# Patient Record
Sex: MALE | Race: BLACK OR AFRICAN AMERICAN | NOT HISPANIC OR LATINO | URBAN - METROPOLITAN AREA
[De-identification: names, ages, dates, MRNs, and addresses within clinical notes are randomized per-mention and may not be internally consistent; named-entity substitution may affect disease eponyms.]

---

## 2020-08-30 ENCOUNTER — INPATIENT (INPATIENT)
Facility: HOSPITAL | Age: 80
LOS: 3 days | Discharge: NURSING, REHAB OR LONG-TERM CARE | End: 2020-09-03
Attending: INTERNAL MEDICINE | Admitting: INTERNAL MEDICINE
Payer: MEDICARE

## 2020-08-30 VITALS
DIASTOLIC BLOOD PRESSURE: 75 MMHG | HEART RATE: 68 BPM | RESPIRATION RATE: 17 BRPM | OXYGEN SATURATION: 100 % | TEMPERATURE: 98 F | SYSTOLIC BLOOD PRESSURE: 122 MMHG

## 2020-08-30 DIAGNOSIS — I48.91 UNSPECIFIED ATRIAL FIBRILLATION: ICD-10-CM

## 2020-08-30 DIAGNOSIS — N18.9 CHRONIC KIDNEY DISEASE, UNSPECIFIED: ICD-10-CM

## 2020-08-30 DIAGNOSIS — I10 ESSENTIAL (PRIMARY) HYPERTENSION: ICD-10-CM

## 2020-08-30 DIAGNOSIS — I63.9 CEREBRAL INFARCTION, UNSPECIFIED: ICD-10-CM

## 2020-08-30 DIAGNOSIS — D64.9 ANEMIA, UNSPECIFIED: ICD-10-CM

## 2020-08-30 DIAGNOSIS — Z29.9 ENCOUNTER FOR PROPHYLACTIC MEASURES, UNSPECIFIED: ICD-10-CM

## 2020-08-30 DIAGNOSIS — F19.10 OTHER PSYCHOACTIVE SUBSTANCE ABUSE, UNCOMPLICATED: ICD-10-CM

## 2020-08-30 DIAGNOSIS — E78.5 HYPERLIPIDEMIA, UNSPECIFIED: ICD-10-CM

## 2020-08-30 DIAGNOSIS — I72.9 ANEURYSM OF UNSPECIFIED SITE: ICD-10-CM

## 2020-08-30 LAB
ALBUMIN SERPL ELPH-MCNC: 4.3 G/DL — SIGNIFICANT CHANGE UP (ref 3.3–5)
ALP SERPL-CCNC: 55 U/L — SIGNIFICANT CHANGE UP (ref 40–120)
ALT FLD-CCNC: 19 U/L — SIGNIFICANT CHANGE UP (ref 4–41)
ANION GAP SERPL CALC-SCNC: 16 MMO/L — HIGH (ref 7–14)
APPEARANCE UR: CLEAR — SIGNIFICANT CHANGE UP
APTT BLD: 34.6 SEC — SIGNIFICANT CHANGE UP (ref 27–36.3)
AST SERPL-CCNC: 31 U/L — SIGNIFICANT CHANGE UP (ref 4–40)
BACTERIA # UR AUTO: HIGH
BASOPHILS # BLD AUTO: 0.02 K/UL — SIGNIFICANT CHANGE UP (ref 0–0.2)
BASOPHILS NFR BLD AUTO: 0.3 % — SIGNIFICANT CHANGE UP (ref 0–2)
BILIRUB SERPL-MCNC: 0.4 MG/DL — SIGNIFICANT CHANGE UP (ref 0.2–1.2)
BILIRUB UR-MCNC: NEGATIVE — SIGNIFICANT CHANGE UP
BLD GP AB SCN SERPL QL: NEGATIVE — SIGNIFICANT CHANGE UP
BLOOD UR QL VISUAL: NEGATIVE — SIGNIFICANT CHANGE UP
BUN SERPL-MCNC: 43 MG/DL — HIGH (ref 7–23)
CALCIUM SERPL-MCNC: 10.1 MG/DL — SIGNIFICANT CHANGE UP (ref 8.4–10.5)
CHLORIDE SERPL-SCNC: 103 MMOL/L — SIGNIFICANT CHANGE UP (ref 98–107)
CHLORIDE UR-SCNC: 72 MMOL/L — SIGNIFICANT CHANGE UP
CO2 SERPL-SCNC: 21 MMOL/L — LOW (ref 22–31)
COLOR SPEC: SIGNIFICANT CHANGE UP
CREAT ?TM UR-MCNC: 79.5 MG/DL — SIGNIFICANT CHANGE UP
CREAT SERPL-MCNC: 2.3 MG/DL — HIGH (ref 0.5–1.3)
EOSINOPHIL # BLD AUTO: 0.09 K/UL — SIGNIFICANT CHANGE UP (ref 0–0.5)
EOSINOPHIL NFR BLD AUTO: 1.3 % — SIGNIFICANT CHANGE UP (ref 0–6)
GLUCOSE SERPL-MCNC: 115 MG/DL — HIGH (ref 70–99)
GLUCOSE UR-MCNC: NEGATIVE — SIGNIFICANT CHANGE UP
HCT VFR BLD CALC: 25.8 % — LOW (ref 39–50)
HGB BLD-MCNC: 8.7 G/DL — LOW (ref 13–17)
HYALINE CASTS # UR AUTO: NEGATIVE — SIGNIFICANT CHANGE UP
IMM GRANULOCYTES NFR BLD AUTO: 0.6 % — SIGNIFICANT CHANGE UP (ref 0–1.5)
INR BLD: 1.6 — HIGH (ref 0.88–1.16)
KETONES UR-MCNC: NEGATIVE — SIGNIFICANT CHANGE UP
LEUKOCYTE ESTERASE UR-ACNC: NEGATIVE — SIGNIFICANT CHANGE UP
LYMPHOCYTES # BLD AUTO: 0.89 K/UL — LOW (ref 1–3.3)
LYMPHOCYTES # BLD AUTO: 12.7 % — LOW (ref 13–44)
MCHC RBC-ENTMCNC: 33.7 % — SIGNIFICANT CHANGE UP (ref 32–36)
MCHC RBC-ENTMCNC: 35.4 PG — HIGH (ref 27–34)
MCV RBC AUTO: 104.9 FL — HIGH (ref 80–100)
MONOCYTES # BLD AUTO: 0.43 K/UL — SIGNIFICANT CHANGE UP (ref 0–0.9)
MONOCYTES NFR BLD AUTO: 6.1 % — SIGNIFICANT CHANGE UP (ref 2–14)
NEUTROPHILS # BLD AUTO: 5.54 K/UL — SIGNIFICANT CHANGE UP (ref 1.8–7.4)
NEUTROPHILS NFR BLD AUTO: 79 % — HIGH (ref 43–77)
NITRITE UR-MCNC: POSITIVE — HIGH
NRBC # FLD: 0 K/UL — SIGNIFICANT CHANGE UP (ref 0–0)
PH UR: 6.5 — SIGNIFICANT CHANGE UP (ref 5–8)
PLATELET # BLD AUTO: 197 K/UL — SIGNIFICANT CHANGE UP (ref 150–400)
PMV BLD: 10 FL — SIGNIFICANT CHANGE UP (ref 7–13)
POTASSIUM SERPL-MCNC: 4.6 MMOL/L — SIGNIFICANT CHANGE UP (ref 3.5–5.3)
POTASSIUM SERPL-SCNC: 4.6 MMOL/L — SIGNIFICANT CHANGE UP (ref 3.5–5.3)
POTASSIUM UR-SCNC: 46.2 MMOL/L — SIGNIFICANT CHANGE UP
PROT SERPL-MCNC: 7.5 G/DL — SIGNIFICANT CHANGE UP (ref 6–8.3)
PROT UR-MCNC: 9.4 MG/DL — SIGNIFICANT CHANGE UP
PROT UR-MCNC: NEGATIVE — SIGNIFICANT CHANGE UP
PROTHROM AB SERPL-ACNC: 18 SEC — HIGH (ref 10.6–13.6)
RBC # BLD: 2.46 M/UL — LOW (ref 4.2–5.8)
RBC # FLD: 15.1 % — HIGH (ref 10.3–14.5)
RBC CASTS # UR COMP ASSIST: SIGNIFICANT CHANGE UP (ref 0–?)
RH IG SCN BLD-IMP: POSITIVE — SIGNIFICANT CHANGE UP
SARS-COV-2 RNA SPEC QL NAA+PROBE: SIGNIFICANT CHANGE UP
SODIUM SERPL-SCNC: 140 MMOL/L — SIGNIFICANT CHANGE UP (ref 135–145)
SODIUM UR-SCNC: 73 MMOL/L — SIGNIFICANT CHANGE UP
SP GR SPEC: 1.04 — SIGNIFICANT CHANGE UP (ref 1–1.04)
SQUAMOUS # UR AUTO: SIGNIFICANT CHANGE UP
TROPONIN T, HIGH SENSITIVITY: 21 NG/L — SIGNIFICANT CHANGE UP (ref ?–14)
UROBILINOGEN FLD QL: NORMAL — SIGNIFICANT CHANGE UP
WBC # BLD: 7.01 K/UL — SIGNIFICANT CHANGE UP (ref 3.8–10.5)
WBC # FLD AUTO: 7.01 K/UL — SIGNIFICANT CHANGE UP (ref 3.8–10.5)
WBC UR QL: SIGNIFICANT CHANGE UP (ref 0–?)

## 2020-08-30 PROCEDURE — 70450 CT HEAD/BRAIN W/O DYE: CPT | Mod: 26,59

## 2020-08-30 PROCEDURE — 99285 EMERGENCY DEPT VISIT HI MDM: CPT

## 2020-08-30 PROCEDURE — 70496 CT ANGIOGRAPHY HEAD: CPT | Mod: 26

## 2020-08-30 PROCEDURE — 99222 1ST HOSP IP/OBS MODERATE 55: CPT

## 2020-08-30 PROCEDURE — 70498 CT ANGIOGRAPHY NECK: CPT | Mod: 26

## 2020-08-30 RX ORDER — THIAMINE MONONITRATE (VIT B1) 100 MG
100 TABLET ORAL DAILY
Refills: 0 | Status: DISCONTINUED | OUTPATIENT
Start: 2020-08-30 | End: 2020-09-03

## 2020-08-30 RX ORDER — ASPIRIN/CALCIUM CARB/MAGNESIUM 324 MG
81 TABLET ORAL DAILY
Refills: 0 | Status: DISCONTINUED | OUTPATIENT
Start: 2020-08-30 | End: 2020-09-03

## 2020-08-30 RX ORDER — RIVAROXABAN 15 MG-20MG
0 KIT ORAL
Qty: 0 | Refills: 0 | DISCHARGE

## 2020-08-30 RX ORDER — PANTOPRAZOLE SODIUM 20 MG/1
40 TABLET, DELAYED RELEASE ORAL
Refills: 0 | Status: DISCONTINUED | OUTPATIENT
Start: 2020-08-30 | End: 2020-09-03

## 2020-08-30 RX ORDER — FOLIC ACID 0.8 MG
1 TABLET ORAL DAILY
Refills: 0 | Status: DISCONTINUED | OUTPATIENT
Start: 2020-08-30 | End: 2020-09-03

## 2020-08-30 RX ORDER — LIDOCAINE 4 G/100G
1 CREAM TOPICAL DAILY
Refills: 0 | Status: DISCONTINUED | OUTPATIENT
Start: 2020-08-30 | End: 2020-09-03

## 2020-08-30 RX ORDER — ALLOPURINOL 300 MG
100 TABLET ORAL DAILY
Refills: 0 | Status: DISCONTINUED | OUTPATIENT
Start: 2020-08-30 | End: 2020-09-03

## 2020-08-30 RX ORDER — ATORVASTATIN CALCIUM 80 MG/1
40 TABLET, FILM COATED ORAL AT BEDTIME
Refills: 0 | Status: DISCONTINUED | OUTPATIENT
Start: 2020-08-30 | End: 2020-09-03

## 2020-08-30 RX ORDER — MORPHINE SULFATE 50 MG/1
4 CAPSULE, EXTENDED RELEASE ORAL ONCE
Refills: 0 | Status: DISCONTINUED | OUTPATIENT
Start: 2020-08-30 | End: 2020-08-30

## 2020-08-30 RX ORDER — RIVAROXABAN 15 MG-20MG
15 KIT ORAL
Refills: 0 | Status: DISCONTINUED | OUTPATIENT
Start: 2020-08-30 | End: 2020-09-03

## 2020-08-30 RX ORDER — ACETAMINOPHEN 500 MG
650 TABLET ORAL EVERY 6 HOURS
Refills: 0 | Status: DISCONTINUED | OUTPATIENT
Start: 2020-08-30 | End: 2020-09-03

## 2020-08-30 RX ADMIN — MORPHINE SULFATE 4 MILLIGRAM(S): 50 CAPSULE, EXTENDED RELEASE ORAL at 12:00

## 2020-08-30 RX ADMIN — Medication 100 MILLIGRAM(S): at 18:42

## 2020-08-30 RX ADMIN — Medication 650 MILLIGRAM(S): at 19:33

## 2020-08-30 RX ADMIN — Medication 81 MILLIGRAM(S): at 18:42

## 2020-08-30 RX ADMIN — ATORVASTATIN CALCIUM 40 MILLIGRAM(S): 80 TABLET, FILM COATED ORAL at 23:20

## 2020-08-30 RX ADMIN — RIVAROXABAN 15 MILLIGRAM(S): KIT at 18:42

## 2020-08-30 RX ADMIN — Medication 1 MILLIGRAM(S): at 18:43

## 2020-08-30 RX ADMIN — LIDOCAINE 1 PATCH: 4 CREAM TOPICAL at 23:20

## 2020-08-30 RX ADMIN — Medication 650 MILLIGRAM(S): at 18:42

## 2020-08-30 NOTE — H&P ADULT - NSICDXFAMILYHX_GEN_ALL_CORE_FT
FAMILY HISTORY:  Family history of heart disease    Sibling  Still living? Unknown  Family history of cerebrovascular accident (CVA), Age at diagnosis: Age Unknown

## 2020-08-30 NOTE — CONSULT NOTE ADULT - ATTENDING COMMENTS
79-year-old right-handed gentleman first evaluated at [facility] on 8/31/2020 with left-sided weakness and possibly left-sided and truncal ataxia.  History and exam as above, with minor changes.  ROS otherwise negative.  Exam.  Alert and attentive; left side: Deltoid 4/5, wrist extensors 4/5, iliopsoas 4/5, anterior tibialis 5/5; no limb ataxia; gait not tested; remainder of neurologic exam was nonfocal.  CT head (8/30/20) to my eye was unremarkable.  CTA neck and head (8/30/2020) to my eye showed an ACOM region aneurysm measured at 4-5 mm.  Impression.  He has a history of atrial fibrillation, on Xarelto, coronary stents inserted 2005, pacemaker.  On 8/30/2020 he developed left hemiparesis and imbalance, and appeared to initially possibly have the syndrome of left ataxic hemiparesis, with some subsequent improvement.  His presentation is consistent with right brain dysfunction, perhaps small-deep/subcortical or pontine but this is uncertain.  Etiology is probably acute ischemic stroke of uncertain mechanism, but possibly due to small vessel disease versus cardioembolism related to atrial fibrillation.  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  Given his age, it's unclear whether he will benefit from any kind of treatment of this aneurysm, but this possibility can be pursued electively.  Suggest.  If pacemaker compatible, then obtain MRI brain; if pacemaker not compatible, then repeat CT head; elective TTE as inpatient or outpatient; continue Xarelto (he has also been on aspirin, but the risk/benefit ratio of continuing aspirin should be discussed further with his cardiologist); follow-up as outpatient with Dr. Monty Martin (neuro interventional surgery); PT/OT.
Incidental unruptured AComm aneurysm, will follow up in the office.

## 2020-08-30 NOTE — H&P ADULT - HISTORY OF PRESENT ILLNESS
79 y.o male pmh of CAD s/p stents x 3 (2005), CKD, Afib on Xarelto, HTN,  s/p Medtronic PPM, myositis, presents with weakness and neck pain. Patient states that at 830AM he picked up a heavy object in the kitchen, put it down and started to have bilateral neck pain that was 11/10, sudden onset, shooting type of pain that radited to his mid spine. He tried to sit for a while but when he got up noticed his left arm was tingling and weak feeling and he could not walk well on his left leg. though pain had some mild improvement weakness did not and he came to the ER. He does note having a weak pain in both shoulders over past weeks but was not hte same. Of note reports that he is being scheduled for an endoscopy due to concern about blood in his stool. has had some weight loss unsure how much. Denies fever, chills, nausea, vomiting, brigid pain, palpitations, shaking/seizure activity, tongue biting, urinary incontinence, dizziness, syncope, fall, loc, head trauma, diplopia, sudden vision loss, diarrhea, brbpr, melena, dysuria hematuria, rash, weight gain, change in appetite, recent travel, sick contact or hospitalization.

## 2020-08-30 NOTE — ED ADULT TRIAGE NOTE - CHIEF COMPLAINT QUOTE
Pt states he has been having neck and back pain x1 week. Today pt lifted something heavy and states neck and back pain was unbearable. Pt also states at 830 he started having left sided leg and arm weakness. Pt unsteady gait in triage. OFE called. Code Stroke

## 2020-08-30 NOTE — CONSULT NOTE ADULT - SUBJECTIVE AND OBJECTIVE BOX
HPI:  79 year old male with a past medical history of HTN, HLD, CAD s/p stent, afib on Xarelto, pacemaker, presenting as a code stroke with left sided weakness and gait imbalance. At baseline, the patient does not use any ambulatory assistive devices and is able to hand all his own needs. The patient was last normal at approximately 8AM. At approximately 830AM the patient stated he lifted something heavy over his head and then sat down and felt weakness in his left upper extremity and pain in his neck on the left side. He then noted that his walking was off balance and that he had some left leg weakness. He denied any numbness, tingling, visual changes, dysphagia, dysarthria, inability to produce words, headaches, facial weakness/numbness. He stated that he had been compliant with all of his home medications. Denied any falls, head trauma, recent illness, sick contacts.   Currently continues to endorse shoulder and neck pain.     Vital Signs Last 24 Hrs  T(C): 36.4 (30 Aug 2020 12:21), Max: 36.7 (30 Aug 2020 10:12)  T(F): 97.5 (30 Aug 2020 12:21), Max: 98 (30 Aug 2020 10:12)  HR: 64 (30 Aug 2020 12:21) (64 - 68)  BP: 117/55 (30 Aug 2020 12:21) (117/55 - 122/75)  BP(mean): --  RR: 18 (30 Aug 2020 12:21) (17 - 18)  SpO2: 100% (30 Aug 2020 12:21) (100% - 100%)    PHYSICAL EXAM:  Awake Alert Attentive Affect appropriate Ox3  PERRL EOMI  Motor:   Tone: normal.                  Strength:     [X] Upper extremity                      Delt       Bicep    Tricep                                                  R   4+/5       4+/5       4+/5     4/5                                               L          5/5        5/5        5/5       5/5  [X] Lower extremity                       HF          KE          KF        DF         PF                                               R    5/5        5/5        5/5       5/5       5/5                                               L    5/5        5/5       5/5       5/5        5/5  Sensory Intact to Light Touch  Reflexes WNL, No clonus    LABS:                          8.7    7.01  )-----------( 197      ( 30 Aug 2020 10:40 )             25.8     08-30    140  |  103  |  43<H>  ----------------------------<  115<H>  4.6   |  21<L>  |  2.30<H>    Ca    10.1      30 Aug 2020 10:40    TPro  7.5  /  Alb  4.3  /  TBili  0.4  /  DBili  x   /  AST  31  /  ALT  19  /  AlkPhos  55  08-30    PT/INR - ( 30 Aug 2020 10:40 )   PT: 18.0 SEC;   INR: 1.60          PTT - ( 30 Aug 2020 10:40 )  PTT:34.6 SEC      RADIOLOGY & ADDITIONAL STUDIES:  < from: CT Angio Neck w/ IV Cont (08.30.20 @ 10:59) >    CT ANGIOGRAPHY BRAIN:    Multilobulated superiorly projecting anterior communicating artery aneurysm. The dome measures 4 mm in size. The neck is broad, measuring 4 to 5 mm in size.    No flow-limiting stenosis    < end of copied text >

## 2020-08-30 NOTE — H&P ADULT - PROBLEM SELECTOR PLAN 7
CHADS-Vasc - 4  On Xarelto for lifelong stroke risk reduciton CHADS-Vasc - 4  On Xarelto for lifelong stroke risk reduction

## 2020-08-30 NOTE — ED ADULT NURSE NOTE - NSIMPLEMENTINTERV_GEN_ALL_ED
Implemented All Fall Risk Interventions:  [place] to call system. Call bell, personal items and telephone within reach. Instruct patient to call for assistance. Room bathroom lighting operational. Non-slip footwear when patient is off stretcher. Physically safe environment: no spills, clutter or unnecessary equipment. Stretcher in lowest position, wheels locked, appropriate side rails in place. Provide visual cue, wrist band, yellow gown, etc. Monitor gait and stability. Monitor for mental status changes and reorient to person, place, and time. Review medications for side effects contributing to fall risk. Reinforce activity limits and safety measures with patient and family.

## 2020-08-30 NOTE — H&P ADULT - NSHPREVIEWOFSYSTEMS_GEN_ALL_CORE
CONSTITUTIONAL: No fever; No chills; No night sweats; No weight loss; No fatigue  EYES: No eye pain; No blurry vision  ENMT:  No difficulty hearing; No sinus or throat pain  NECK: see above  RESPIRATORY: No cough; No wheezing; No hemoptysis; No shortness of breath; No sputum production  CARDIOVASCULAR: No chest pain; No palpitations; No leg swelling  GASTROINTESTINAL: No abdominal pain; No nausea; No vomiting; No hematemesis; No diarrhea; No constipation. No melena or hematochezia.  GENITOURINARY: No dysuria; No frequency; No hematuria; No incontinence  NEUROLOGICAL: see above  SKIN: No itching/burning; No rashes or lesions   MUSCULOSKELETAL: No joint pain or swelling; No muscle, back, or extremity pain  HEME/LYMPH: No easy bruising, or bleeding gums

## 2020-08-30 NOTE — H&P ADULT - MOTOR
Normal muscle bulk & tone. No noticeable tremor or seizure. No pronator drift.              Deltoid	Biceps	Triceps	Wrist	Finger ABd	   R	5	5	5	5	5		5 	  L	4	4	4	4	4		4    	H-Flex	H-Ext	H-ABd	H-ADd	K-Flex	K-Ext	D-Flex	P-Flex  R	5	5	5	5	5	5	5	5 	   L	4	4	4	4	4	4	5	5

## 2020-08-30 NOTE — H&P ADULT - NSHPOUTPATIENTPROVIDERS_GEN_ALL_CORE
Dr. Yon Carlson - PMD   [facility] - Cardiology  Dr. Queen - EP  Dr. Ornelas - GI  Dr. Lilly -

## 2020-08-30 NOTE — ED PROVIDER NOTE - RAPID ASSESSMENT
Usha Bhat MD: 79 yom pmh of HTN, CAD with stent p/w sudden onset of weakness of LUE and LLE. Last known normal 8:30AM this morning (2hrs ago). pt states that symptoms start after lifting heavy object this morning. Report unsteady gait since symptoms started Report neck b/l shoulder pain x 1 week. Exam 4/5 strength LUE and LLE, Awake, alert, and oriented.  Cranial nerves 2-12 intact.   Cerebellar function intact by finger-to-nose testing.  Sensation grossly intact.  Negative pronator.  Stroke code activated

## 2020-08-30 NOTE — H&P ADULT - PROBLEM SELECTOR PLAN 2
Small 4-5mm ACOM Aneurysm noted  Appreciate Debbie bailey - ollow up with Dr. Douglas for Acomm aneurysm. Please call the office to schedule a follow up visit [phone] upon discharge. 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 Douglas for Acomm aneurysm. Please call the office to schedule a follow up visit [phone] upon discharge.

## 2020-08-30 NOTE — ED PROVIDER NOTE - FAMILY HISTORY
Mother  Still living? Unknown  Family history of heart disease, Age at diagnosis: Age Unknown     Sibling  Still living? Unknown  Family history of cerebrovascular accident (CVA), Age at diagnosis: Age Unknown

## 2020-08-30 NOTE — H&P ADULT - ASSESSMENT
79 y.o male pmh of CAD s/p stents x 3 (2005), CKD, Afib on Xarelto, HTN,  s/p Medtronic PPM, myositis, presents with weakness and neck pain admitted to rule out stroke.

## 2020-08-30 NOTE — ED PROVIDER NOTE - CARDIAC, MLM
Normal rate, regular rhythm.  Heart sounds S1, S2.  No murmurs, rubs or gallops. PPM in place left chest

## 2020-08-30 NOTE — ED ADULT NURSE NOTE - OBJECTIVE STATEMENT
RN Facilitator: CODE STROKE. Pt arrives to ED c/o left sided weakness starting 8:30am this morning. Reports symptoms started after he was lifting heavy objects. Pt noted to have an unsteady gait at this time, limping to the left side. Also states he has had neck and bilateral shoulder pian x1 week. No facial droop noted, pt's speech is clear with no slurred speech noted. Pt speaks in full sentences without difficulty. 20 G IV placed to right AC. Hx of HTN, CAD with stents. Report given to BRIDGET Viera.

## 2020-08-30 NOTE — H&P ADULT - PROBLEM SELECTOR PLAN 5
Macrocytic anemia - may be due to Etoh abuse  Will check Iron profile including vitamin b12, folate.  Outpt follow up with GI to plan potential endoscopy.

## 2020-08-30 NOTE — H&P ADULT - PROBLEM SELECTOR PLAN 6
Unclear baseline renal function  Check UA/Urine Lytes/ Protein/Creatitine Unclear baseline renal function  Check UA/Urine Lytes/ Protein /Creatinine

## 2020-08-30 NOTE — H&P ADULT - CRANIAL NERVE
PERRLA (R = 3mm, L = 3mm). VF in all 4 quadrants. EOMI no nystagmus, no diplopia. V1-3 intact to LT/pinprick, well developed masseter muscles b/l. No facial asymmetry b/l, full eye closure strength b/l.  Symmetric palate elevation in midline. Gag reflex deferred. Head turning & shoulder shrug intact b/l. Tongue midline, normal movements, no atrophy.

## 2020-08-30 NOTE — CONSULT NOTE ADULT - ASSESSMENT
Castro Riojas is a 79 year old male with a past medical history of HTN, HLD, CAD s/p stent, afib on Xarelto, pacemaker, presenting as a code stroke with left sided weakness and gait imbalance.     Impression: L sided weakness and gait imbalance suspicious for a stroke in R medulla prior to decussation in setting of known afib and likely etiology of cardioembolic stroke.     Not a tPa candidate due to Xarelto use.  Not a endovascular candidate due to no large vessel occlusion present  Already taking aspirin.    Recs:  Meds:  []  Aspirin 81mg  []  Atorvastatin 40, titrate to LDL<70  []  DVT prophylaxis     Imaging:  [] MRI brain w/o contrast to look at the extent and distribution of the stroke , if pacemaker compatible  [] if pacemaker not compatible with MRI, repeat head CT in 24 hours  [] TTE with bubble study and telemetry to look for a cardiac source of embolism.     Other:  [] Neurochecks and vitals q4h,   [] Permissive HTN up to 220/120 mmHg for first 24 hours after the symptom onset followed by gradual normotension.   [] Please send HbA1C and Lipid Panel  [] BGM goals 140-180  []  PT/OT evaluation  []  NPO until clears Dysphagia screen, otherwise Swallow evaluation  []  Stroke education provided    Case to be discussed with stroke attending Dr. Libman in AM. Castro Riojas is a 79 year old male with a past medical history of HTN, HLD, CAD s/p stent, afib on Xarelto, pacemaker, presenting as a code stroke with left sided weakness and gait imbalance.     Impression: L sided weakness and gait imbalance suspicious for a stroke in R medulla prior to decussation in setting of known afib and likely etiology of cardioembolic stroke.     Not a tPa candidate due to Xarelto use.  Not a endovascular candidate due to no large vessel occlusion present  Already taking aspirin.    Recs:  Meds:  []  Aspirin 81mg  []  Atorvastatin 40, titrate to LDL<70  []  DVT prophylaxis, continue home Xarelto    Imaging:  [] MRI brain w/o contrast to look at the extent and distribution of the stroke , if pacemaker compatible  [] if pacemaker not compatible with MRI, repeat head CT in 24 hours  [] TTE with bubble study and telemetry to look for a cardiac source of embolism.     Other:  [] Neurochecks and vitals q4h,   [] Permissive HTN up to 220/120 mmHg for first 24 hours after the symptom onset followed by gradual normotension.   [] Please send HbA1C and Lipid Panel  [] BGM goals 140-180  []  PT/OT evaluation  []  NPO until clears Dysphagia screen, otherwise Swallow evaluation  []  Stroke education provided    Case to be discussed with stroke attending Dr. Libman in AM. Castro Riojas is a 79 year old male with a past medical history of HTN, HLD, CAD s/p stent, afib on Xarelto, pacemaker, presenting as a code stroke with left sided weakness and gait imbalance.     Impression: L sided weakness and gait imbalance suspicious for a stroke in R medulla prior to decussation in setting of known afib and likely etiology of cardioembolic stroke.     Not a tPa candidate due to Xarelto use.  Not a endovascular candidate due to no large vessel occlusion present  Already taking aspirin.    Recs:  Meds:  []  Aspirin 81mg  []  Atorvastatin 40, titrate to LDL<70  []  DVT prophylaxis, continue home Xarelto    Imaging:  [] MRI brain w/o contrast to look at the extent and distribution of the stroke , if pacemaker compatible  [] if pacemaker not compatible with MRI, repeat head CT in 24 hours  [] TTE with bubble study and telemetry to look for a cardiac source of embolism.     Other:  [] may require neurosurgery consult for noted ACOM aneurysm  [] Neurochecks and vitals q4h,   [] Permissive HTN up to 220/120 mmHg for first 24 hours after the symptom onset followed by gradual normotension.   [] Please send HbA1C and Lipid Panel  [] BGM goals 140-180  []  PT/OT evaluation  []  NPO until clears Dysphagia screen, otherwise Swallow evaluation  []  Stroke education provided    Case to be discussed with stroke attending Dr. Libman in AM.

## 2020-08-30 NOTE — ED PROVIDER NOTE - PROGRESS NOTE DETAILS
On Xarelto so not a tPA candidate. CTA H&N shows 5mm aneurysm, consulted Neurosurgery. Will admit pending consult

## 2020-08-30 NOTE — ED ADULT NURSE REASSESSMENT NOTE - NS ED NURSE REASSESS COMMENT FT1
Pt. A&Ox4 presents with left sided weakness, changed into gown. ER Resident at bedside attempting to contact wife and assessing pt. gait/weakness. EKG in progress. Will CTM.

## 2020-08-30 NOTE — ED PROVIDER NOTE - OBJECTIVE STATEMENT
79 yom pmh of HTN, CAD with stent on Xarelto p/w sudden onset of weakness of LUE and LLE. Last known normal 8:30AM this morning (2hrs ago). pt states that symptoms start after lifting heavy object this morning. Report unsteady gait since symptoms started Report neck b/l shoulder pain x 1 week. Exam 4/5 strength LUE and LLE, Awake, alert, and oriented.  Cranial nerves 2-12 intact.   Cerebellar function intact by finger-to-nose testing.  Sensation grossly intact.  Negative pronator.  Stroke code activated on arrival in ED. 79M pmh of HTN, CAD with stent, afib on Xarelto, reported hx myositis, diverticulitis, and PPM for "pauses" p/w sudden onset of weakness of LUE and LLE. Last known normal 8:30AM this morning (2hrs ago). pt states that symptoms start after lifting heavy object this morning. Report unsteady gait since symptoms started Report neck b/l shoulder pain x 1 week. After lifting object, had shooting pain from neck down to his mid-spine. This pain did not persist. Other dull pain in shoulders has been worsening over past week, rates as 11/10. Further, reports that he recently saw GI who informed him he has a GI bleed. On iron, so not sure if he has melenic stools.   Code stroke activated upon arrival at [facility]. CTH negative, CTA H&N shows 4mm OSIRIS aneurysm.

## 2020-08-30 NOTE — H&P ADULT - ATTENDING COMMENTS
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 Patient reports sudden onset L sided weakness and pain, gait instability, no prior similar event with slight improvement since being hospitalized. Of note, patient is on Xarelto for afib, long hx of anemia, baseline Hbg 8-10 for which he underwent a BM bx reportedly without a cause found. Has been on iron supplementation but GI called saying his FOBT was positive. Recent C-scope within last year negative. Has a hx of CKD though he does not know his baseline Cr. On exam, VSS, PE notable for 4/5 strength in LLE and 4+ in LUE. Labs notable for Hbg 8.7, macrocytic, Cr 2.3, no prior baseline. CT head/neck: neg for ICH, multilobulated anterior communicating artery aneurysm.     # L sided weakness and pain: Unilateral UE & LE weakness c/f central etiology. Will follow up MRI brain if PPM compatible, TTE, A1C. Cont. ASA, statin. Allow for permissive HTN up to 220/120. Neurology following  # Anterior communicating artery aneurysm: Follow up OP with NSGY.   # Afib: Reports compliance with Xarelto, has not missed any doses, continue   # Macrocytic Anemia: Long-standing history, no gross bleeding appreciated by patient. Will check B12. Has a hx of etoh use, may be contributing.   #Elevated Cr: Patient reports a hx of CKD, does not know baseline Cr. Will have to get OP records from PCP: Dr. Yon Carlson  # L shoulder pain: TTP of L shoulder and occiput, has been responding to Tylenol. Will check CK in light of hx of myositis. Lidocaine patch.

## 2020-08-30 NOTE — CONSULT NOTE ADULT - ASSESSMENT
79year old male with PMHHTN, HLD, CAD s/p stent, afib on Xarelto, pacemaker, presenting as a code stroke with left sided weakness and gait imbalance after lifting heavy object. CTA showing 4mm Acomm aneurysm, likely incidental finding.

## 2020-08-30 NOTE — CONSULT NOTE ADULT - SUBJECTIVE AND OBJECTIVE BOX
HPI:  Castro Riojas is a 79 year old male with a past medical history ...    (Stroke only)  NIHSS: 2  MRS: 0    REVIEW OF SYSTEMS    A 10-system ROS was performed and is negative except for those items noted above and/or in the HPI.    PAST MEDICAL & SURGICAL HISTORY:  CAD s/p stents, AICD  HTN  HLD    SOCIAL HISTORY:   T/E/D:   Occupation:   Lives with:     MEDICATIONS (HOME):  Home Medications:    MEDICATIONS  (STANDING):    MEDICATIONS  (PRN):    ALLERGIES/INTOLERANCES:  Allergies  No Known Allergies    Intolerances    VITALS & EXAMINATION:  Vital Signs Last 24 Hrs  T(C): 36.7 (30 Aug 2020 10:12), Max: 36.7 (30 Aug 2020 10:12)  T(F): 98 (30 Aug 2020 10:12), Max: 98 (30 Aug 2020 10:12)  HR: 68 (30 Aug 2020 10:12) (68 - 68)  BP: 122/75 (30 Aug 2020 10:12) (122/75 - 122/75)  BP(mean): --  RR: 17 (30 Aug 2020 10:12) (17 - 17)  SpO2: 100% (30 Aug 2020 10:12) (100% - 100%)    General:  Constitutional: Obese Male, appears stated age, in no apparent distress including pain  Head: Normocephalic & atraumatic.  ENT: Patent ear canals, intact TM, mucus membranes moist & pink, neck supple, no lymphadenopathy.   Respiratory: Patent airway. All lung fields are clear to auscultation bilaterally.  Extremities: No cyanosis, clubbing, or edema.  Skin: No rashes, bruising, or discoloration.    Cardiovascular (>2): RRR no murmurs. Carotid pulsations symmetric, no bruits. Normal capillary beds refill, 1-2 seconds or less.     Neurological (>12):  MS: Awake, alert, oriented to person, place, situation, time. Normal affect. Follows all commands.    Language: Speech is clear, fluent with good repetition & comprehension (able to name objects___)    CNs: PERRLA (R = 3mm, L = 3mm). VFF. EOMI no nystagmus, no diplopia. V1-3 intact to LT/pinprick, well developed masseter muscles b/l. No facial asymmetry b/l, full eye closure strength b/l. Hearing grossly normal (rubbing fingers) b/l. Symmetric palate elevation in midline. Gag reflex deferred. Head turning & shoulder shrug intact b/l. Tongue midline, normal movements, no atrophy.    Fundoscopic: pale w/ sharp discs margins No vascular changes.      Motor: Normal muscle bulk & tone. No noticeable tremor or seizure. No pronator drift.              Deltoid	Biceps	Triceps	Wrist	Finger ABd	   R	5	5	5	5	5		5 	  L	5	5	5	5	5		5    	H-Flex	H-Ext	H-ABd	H-ADd	K-Flex	K-Ext	D-Flex	P-Flex  R	5	5	5	5	5	5	5	5 	   L	5	5	5	5	5	5	5	5	     Sensation: Intact to LT/PP/Temp/Vibration/Position b/l throughout.     Cortical: Extinction on DSS (neglect): none    Reflexes:              Biceps(C5)       BR(C6)     Triceps(C7)               Patellar(L4)    Achilles(S1)    Plantar Resp  R	2	          2	             2		        2		    2		Down   L	2	          2	             2		        2		    2		Down     Coordination: intact rapid-alt movements. No dysmetria to FTN/HTS    Gait: Normal Romberg. No postural instability. Normal stance and tandem gait.     LABORATORY:  CBC   Chem       LFTs   Coagulopathy   Lipid Panel   A1c   Cardiac enzymes     U/A   CSF  Immunological  Other    STUDIES & IMAGING:  Studies (EKG, EEG, EMG, etc):     Radiology (XR, CT, MR, U/S, TTE/LANCE): HPI:  Castro Riojas is a 79 year old male with a past medical history of HTN, HLD, CAD s/p stent, afib on Xarelto, pacemaker, presenting as a code stroke with left sided weakness and gait imbalance. At baseline, the patient does not use any ambulatory assistive devices and is able to hand all his own needs. The patient was last normal at approximately 8AM. At approximately 830AM the patient stated he lifted something heavy over his head and then sat down and felt weakness in his left upper extremity and pain in his neck on the left side. He then noted that his walking was off balance and that he had some left leg weakness. He denied any numbness, tingling, visual changes, dysphagia, dysarthria, inability to produce words, headaches, facial weakness/numbness. He stated that he had been compliant with all of his home medications. Denied any falls, head trauma, recent illness, sick contacts.   Currently continues to endorse shoulder and neck pain.     (Stroke only)  NIHSS: 2  MRS: 0    REVIEW OF SYSTEMS    A 10-system ROS was performed and is negative except for those items noted above and/or in the HPI.    PAST MEDICAL & SURGICAL HISTORY:  CAD s/p stents, AICD  HTN  HLD    SOCIAL HISTORY:  Occupation: retired  Lives with: wife    ALLERGIES/INTOLERANCES:  Allergies  No Known Allergies    Intolerances    VITALS & EXAMINATION:  Vital Signs Last 24 Hrs  T(C): 36.7 (30 Aug 2020 10:12), Max: 36.7 (30 Aug 2020 10:12)  T(F): 98 (30 Aug 2020 10:12), Max: 98 (30 Aug 2020 10:12)  HR: 68 (30 Aug 2020 10:12) (68 - 68)  BP: 122/75 (30 Aug 2020 10:12) (122/75 - 122/75)  BP(mean): --  RR: 17 (30 Aug 2020 10:12) (17 - 17)  SpO2: 100% (30 Aug 2020 10:12) (100% - 100%)    General:  Constitutional: Appears stated age, in pain from his R neck and shoulder.   Head: Normocephalic & atraumatic.    Neurological (>12):  MS: Awake, alert, oriented to person, place, situation, time. Normal affect. Follows all commands.    Language: Speech is clear, fluent with good repetition & comprehension (able to name objects)    CNs: PERRLA (R = 3mm, L = 3mm). VF in all 4 quadrants. EOMI no nystagmus, no diplopia. V1-3 intact to LT/pinprick, well developed masseter muscles b/l. No facial asymmetry b/l, full eye closure strength b/l.  Symmetric palate elevation in midline. Gag reflex deferred. Head turning & shoulder shrug intact b/l. Tongue midline, normal movements, no atrophy.    Motor: Normal muscle bulk & tone. No noticeable tremor or seizure. No pronator drift.              Deltoid	Biceps	Triceps	Wrist	Finger ABd	   R	5	5	5	5	5		5 	  L	4	4	4	4	4		4    	H-Flex	H-Ext	H-ABd	H-ADd	K-Flex	K-Ext	D-Flex	P-Flex  R	5	5	5	5	5	5	5	5 	   L	4	4	4	4	4	4	5	5	     Sensation: Intact to LT/PP/ b/l throughout.     Reflexes:              Biceps(C5)       BR(C6)     Triceps(C7)               Patellar(L4)    Achilles(S1)    Plantar Resp  R	1	          1	             1		        2		    1		Down   L	1	          1	             1		        2		    1		Down     Coordination: Mild end point dysmetria on L finger to nose.     Gait: Leans to L on Romberg testing. Ataxic gait, needs assistance to walk, unstable    LABORATORY:                        8.7    7.01  )-----------( 197      ( 30 Aug 2020 10:40 )             25.8     08-30    140  |  103  |  43<H>  ----------------------------<  115<H>  4.6   |  21<L>  |  2.30<H>    Ca    10.1      30 Aug 2020 10:40    TPro  7.5  /  Alb  4.3  /  TBili  0.4  /  DBili  x   /  AST  31  /  ALT  19  /  AlkPhos  55  08-30    STUDIES & IMAGING:    Radiology (XR, CT, MR, U/S, TTE/LANCE):    < from: CT Brain Stroke Protocol (08.30.20 @ 11:00) >  IMPRESSION:    No hydrocephalus, acute intracranial hemorrhage, mass effect, or brain edema.    < end of copied text >      < from: CT Angio Neck w/ IV Cont (08.30.20 @ 10:59) >  IMPRESSION:    CT ANGIOGRAPHY NECK:    No flow-limiting stenosis or evidence for arterial dissection.    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 The dome measures 4 mm in size. The neck is broad, measuring 4 to 5 mm in size.    No flow-limiting stenosis.    < end of copied text >

## 2020-08-30 NOTE — H&P ADULT - PROBLEM SELECTOR PLAN 1
Admit to telemetry  Appreciate Neurology recommendations  Check PPM for MRI compatibility - if compatible then order MRI if not then needs 24 hour repeat CT.   Permissive HTN to 220/110  Aspirin/Statin  PT/OT and speech and swallow  TTE with bubble  IF cardioembolic CVA would need to address whether this is Xarelto failure  Pain control for neck pain - Will get MRI of C-Spine from Neuro Sx recommendations

## 2020-08-30 NOTE — H&P ADULT - NSHPSOCIALHISTORY_GEN_ALL_CORE
Lives with wife  Smokes marijuana daily  Drinks 1-2 shots of liquor on most days, answered yes to C and A of CAGE questions  Former smoker, quit 30 years ago

## 2020-08-30 NOTE — H&P ADULT - NSHPLABSRESULTS_GEN_ALL_CORE
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 The dome measures 4 mm in size. The neck is broad, measuring 4 to 5 mm in size.  CTA Neck - no stenosis or dissection

## 2020-08-30 NOTE — ED PROVIDER NOTE - CLINICAL SUMMARY MEDICAL DECISION MAKING FREE TEXT BOX
79M PMHx HTN, HLD, myositis p/w sudden onset weakness. History consistent with stroke vs nerve impingement vs myositis vs MSK injury. CTA showing incidental aneurysm. Neurosurgery consulted for anuerysm, likely admit 79M PMHx HTN, HLD, myositis p/w sudden onset weakness. History consistent with stroke vs nerve impingement vs myositis vs MSK injury. CTA showing incidental aneurysm. Neurosurgery consulted for aneurysm, no acute surgical management recommended. Will admit to medicine and obtain MRI. Johnlexiss att: 79M PMHx HTN, HLD, myositis p/w sudden onset weakness. History consistent with stroke vs nerve impingement vs myositis vs MSK injury. CTA showing incidental aneurysm. Neurosurgery consulted for aneurysm, no acute surgical management recommended. Will admit to medicine and obtain MRI.

## 2020-08-30 NOTE — CONSULT NOTE ADULT - PROBLEM SELECTOR RECOMMENDATION 9
1. No acute neurosurgical intervention indicated  2. Would recommend outpatient follow up with Dr. Douglas for Acomm aneurysm  3. Would obtain MRI c spine in addition to brain  Case d/w attending 1. No acute neurosurgical intervention indicated  2. Would recommend outpatient follow up with Dr. Douglas for Acomm aneurysm. Please call the office to schedule a follow up visit [phone] upon discharge.  3. Would obtain MRI c spine in addition to brain  Case d/w attending

## 2020-08-31 LAB
ANION GAP SERPL CALC-SCNC: 13 MMO/L — SIGNIFICANT CHANGE UP (ref 7–14)
BUN SERPL-MCNC: 38 MG/DL — HIGH (ref 7–23)
CALCIUM SERPL-MCNC: 9.7 MG/DL — SIGNIFICANT CHANGE UP (ref 8.4–10.5)
CHLORIDE SERPL-SCNC: 107 MMOL/L — SIGNIFICANT CHANGE UP (ref 98–107)
CHOLEST SERPL-MCNC: 152 MG/DL — SIGNIFICANT CHANGE UP (ref 120–199)
CO2 SERPL-SCNC: 23 MMOL/L — SIGNIFICANT CHANGE UP (ref 22–31)
CREAT SERPL-MCNC: 2.2 MG/DL — HIGH (ref 0.5–1.3)
FERRITIN SERPL-MCNC: 76.11 NG/ML — SIGNIFICANT CHANGE UP (ref 30–400)
FOLATE SERPL-MCNC: 18.5 NG/ML — SIGNIFICANT CHANGE UP (ref 4.7–20)
GLUCOSE SERPL-MCNC: 96 MG/DL — SIGNIFICANT CHANGE UP (ref 70–99)
HAPTOGLOB SERPL-MCNC: 103 MG/DL — SIGNIFICANT CHANGE UP (ref 34–200)
HBA1C BLD-MCNC: 4.1 % — SIGNIFICANT CHANGE UP (ref 4–5.6)
HCT VFR BLD CALC: 22.7 % — LOW (ref 39–50)
HDLC SERPL-MCNC: 48 MG/DL — SIGNIFICANT CHANGE UP (ref 35–55)
HGB BLD-MCNC: 8.3 G/DL — LOW (ref 13–17)
IRON SATN MFR SERPL: 252 UG/DL — SIGNIFICANT CHANGE UP (ref 155–535)
IRON SATN MFR SERPL: 47 UG/DL — SIGNIFICANT CHANGE UP (ref 45–165)
LDH SERPL L TO P-CCNC: 174 U/L — SIGNIFICANT CHANGE UP (ref 135–225)
LIPID PNL WITH DIRECT LDL SERPL: 85 MG/DL — SIGNIFICANT CHANGE UP
MAGNESIUM SERPL-MCNC: 2.2 MG/DL — SIGNIFICANT CHANGE UP (ref 1.6–2.6)
MCHC RBC-ENTMCNC: 36.6 % — HIGH (ref 32–36)
MCHC RBC-ENTMCNC: 37.2 PG — HIGH (ref 27–34)
MCV RBC AUTO: 101.8 FL — HIGH (ref 80–100)
NRBC # FLD: 0 K/UL — SIGNIFICANT CHANGE UP (ref 0–0)
PHOSPHATE SERPL-MCNC: 2.7 MG/DL — SIGNIFICANT CHANGE UP (ref 2.5–4.5)
PLATELET # BLD AUTO: 184 K/UL — SIGNIFICANT CHANGE UP (ref 150–400)
PMV BLD: 10.4 FL — SIGNIFICANT CHANGE UP (ref 7–13)
POTASSIUM SERPL-MCNC: 3.7 MMOL/L — SIGNIFICANT CHANGE UP (ref 3.5–5.3)
POTASSIUM SERPL-SCNC: 3.7 MMOL/L — SIGNIFICANT CHANGE UP (ref 3.5–5.3)
RBC # BLD: 2.23 M/UL — LOW (ref 4.2–5.8)
RBC # FLD: 14.3 % — SIGNIFICANT CHANGE UP (ref 10.3–14.5)
SODIUM SERPL-SCNC: 143 MMOL/L — SIGNIFICANT CHANGE UP (ref 135–145)
TRIGL SERPL-MCNC: 156 MG/DL — HIGH (ref 10–149)
UIBC SERPL-MCNC: 204.6 UG/DL — SIGNIFICANT CHANGE UP (ref 110–370)
VIT B12 SERPL-MCNC: > 2000 PG/ML — HIGH (ref 200–900)
WBC # BLD: 5.86 K/UL — SIGNIFICANT CHANGE UP (ref 3.8–10.5)
WBC # FLD AUTO: 5.86 K/UL — SIGNIFICANT CHANGE UP (ref 3.8–10.5)

## 2020-08-31 PROCEDURE — 93280 PM DEVICE PROGR EVAL DUAL: CPT | Mod: 26

## 2020-08-31 PROCEDURE — 93306 TTE W/DOPPLER COMPLETE: CPT | Mod: 26

## 2020-08-31 PROCEDURE — 99232 SBSQ HOSP IP/OBS MODERATE 35: CPT

## 2020-08-31 PROCEDURE — 70450 CT HEAD/BRAIN W/O DYE: CPT | Mod: 26

## 2020-08-31 PROCEDURE — 99223 1ST HOSP IP/OBS HIGH 75: CPT

## 2020-08-31 RX ORDER — POTASSIUM CHLORIDE 20 MEQ
10 PACKET (EA) ORAL ONCE
Refills: 0 | Status: COMPLETED | OUTPATIENT
Start: 2020-08-31 | End: 2020-08-31

## 2020-08-31 RX ADMIN — Medication 81 MILLIGRAM(S): at 11:47

## 2020-08-31 RX ADMIN — LIDOCAINE 1 PATCH: 4 CREAM TOPICAL at 11:46

## 2020-08-31 RX ADMIN — Medication 100 MILLIGRAM(S): at 11:47

## 2020-08-31 RX ADMIN — LIDOCAINE 1 PATCH: 4 CREAM TOPICAL at 18:06

## 2020-08-31 RX ADMIN — Medication 650 MILLIGRAM(S): at 06:21

## 2020-08-31 RX ADMIN — Medication 650 MILLIGRAM(S): at 17:30

## 2020-08-31 RX ADMIN — Medication 650 MILLIGRAM(S): at 17:07

## 2020-08-31 RX ADMIN — Medication 10 MILLIEQUIVALENT(S): at 17:06

## 2020-08-31 RX ADMIN — ATORVASTATIN CALCIUM 40 MILLIGRAM(S): 80 TABLET, FILM COATED ORAL at 21:49

## 2020-08-31 RX ADMIN — Medication 650 MILLIGRAM(S): at 11:47

## 2020-08-31 RX ADMIN — RIVAROXABAN 15 MILLIGRAM(S): KIT at 17:06

## 2020-08-31 RX ADMIN — LIDOCAINE 1 PATCH: 4 CREAM TOPICAL at 23:14

## 2020-08-31 RX ADMIN — PANTOPRAZOLE SODIUM 40 MILLIGRAM(S): 20 TABLET, DELAYED RELEASE ORAL at 05:21

## 2020-08-31 RX ADMIN — Medication 650 MILLIGRAM(S): at 00:10

## 2020-08-31 RX ADMIN — Medication 650 MILLIGRAM(S): at 12:17

## 2020-08-31 RX ADMIN — Medication 1 MILLIGRAM(S): at 11:47

## 2020-08-31 RX ADMIN — Medication 650 MILLIGRAM(S): at 05:21

## 2020-08-31 RX ADMIN — Medication 650 MILLIGRAM(S): at 01:10

## 2020-08-31 NOTE — PROGRESS NOTE ADULT - PROBLEM SELECTOR PLAN 5
Macrocytic anemia - may be due to Etoh abuse  B12, folate wnl   Outpt follow up with GI to plan potential endoscopy.

## 2020-08-31 NOTE — SWALLOW BEDSIDE ASSESSMENT ADULT - SWALLOW EVAL: RECOMMENDED FEEDING/EATING TECHNIQUES
small sips/bites/maintain upright posture during/after eating for 30 mins/oral hygiene/position upright (90 degrees)

## 2020-08-31 NOTE — PROGRESS NOTE ADULT - PROBLEM/PLAN-6
This note was copied from a baby's chart  CONSULT - LACTATION  Baby Boy Silva Upton 0 days male MRN: [id number]    [facility] NURSERY Room / Bed: L&D 321(N)/L&D 321(N) Encounter: [id number]    Maternal Information     MOTHER:  Justyna Santa  Maternal Age: 33 y o    OB History: #: 1, Date: 10[birth year]06[birth year], Sex: Male, Weight: 3175 g (7 lb), GA: 40w0d, Delivery: Vaginal, Spontaneous Delivery, Apgar1: None, Apgar5: None, Living: Living, Birth Comments: None    #: 2, Date: 13, Sex: Female, Weight: 3232 g (7 lb 2 oz), GA: 40w0d, Delivery: Vaginal, Spontaneous Delivery, Apgar1: None, Apgar5: None, Living: Living, Birth Comments: None    #: 3, Date: 02/16/15, Sex: Female, Weight: 3515 g (7 lb 12 oz), GA: 38w0d, Delivery: Vaginal, Spontaneous Delivery, Apgar1: None, Apgar5: None, Living: Living, Birth Comments: None    #: 4, Date: 17, Sex: Male, Weight: 2977 g (6 lb 9 oz), GA: 39w6d, Delivery: Vaginal, Spontaneous Delivery, Apgar1: 8, Apgar5: 9, Living: Living, Birth Comments: None   Previouse breast reduction surgery?  No    Lactation history:   Has patient previously breast fed: Yes   How long had patient previously breast fed:     Previous breast feeding complications:       Past Surgical History:   Procedure Laterality Date    TONSILLECTOMY      TONSILLECTOMY         Birth information:  YOB: 2016   Time of birth: 11:58 AM   Sex: male   Delivery type: Vaginal, Spontaneous Delivery   Birth Weight: 2977 g (6 lb 9 oz)   Percent of Weight Change: 0%     Gestational Age: 37w11d   [unfilled]    Assessment     Breast and nipple assessment: normal assessment     Assessment: normal assessment    Feeding assessment: feeding well  LATCH:  Latch: Grasps breast, tongue down, lips flanged, rhythmic sucking   Audible Swallowing: None   Type of Nipple: Everted (After stimulation)   Comfort (Breast/Nipple): Soft/non-tender   Hold (Positioning): Full assist, teach one side, mother does other, staff holds   [facility] Score: 7          Feeding recommendations:  breast feed on demand       Breastfeeding booklet given and reviewed with mother  Assisted with positioning and latching  Demo hand expression  Baby latched on well  Mom not very receptive of help, states she has done this before   She c/o burning twice and pulled baby off nipple without breaking the seal  Enc her to continue to support breast throughout feeding and to break the seal before removing him from breast  Encouraged mother to call for assistance as needed,phone # given    Manuela Ross RN 12/11/2017 1:37 PM DISPLAY PLAN FREE TEXT

## 2020-08-31 NOTE — OCCUPATIONAL THERAPY INITIAL EVALUATION ADULT - PLANNED THERAPY INTERVENTIONS, OT EVAL
neuromuscular re-education/balance training/motor coordination training/ADL retraining/transfer training/strengthening

## 2020-08-31 NOTE — SWALLOW BEDSIDE ASSESSMENT ADULT - SWALLOW EVAL: DIAGNOSIS
Patient presents with functional oropharyngeal swallow. The oral phase was marked by adequate oral containment, adequate mastication for regular solids, adequate bolus manipulation and functional oral transit time. The pharyngeal phase was marked by laryngeal elevation upon digital palpation with no overt s/s of laryngeal penetration/aspiration for puree consistency, regular solids and thin liquids.

## 2020-08-31 NOTE — SWALLOW BEDSIDE ASSESSMENT ADULT - COMMENTS
H&P: 79 y.o male pmh of CAD s/p stents x 3 (2005), CKD, Afib on Xarelto, HTN,  s/p [employer] PPM, myositis, presents with weakness and neck pain admitted to rule out stroke.     CTH 8/30/20: no hydrocephalus, acute intracranial hemorrhage, mass effect or brain edema    Patient was seen upright at bedside. Patient was alert/awake and easily engaged in conversation. Patient able to follow simple directions. Patient denies dysphagia symptoms upon questioning.

## 2020-08-31 NOTE — OCCUPATIONAL THERAPY INITIAL EVALUATION ADULT - PERTINENT HX OF CURRENT PROBLEM, REHAB EVAL
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 Patient reports sudden onset L sided weakness and pain, gait instability, no prior similar event with slight improvement since being hospitalized. 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 Patient reports sudden onset L sided weakness and pain, gait instability, no prior similar event with slight improvement since being hospitalized.

## 2020-08-31 NOTE — PROGRESS NOTE ADULT - SUBJECTIVE AND OBJECTIVE BOX
[facility] Division of Hospital Medicine  Boy Claire MD  Pager [phone]      Patient is a 79y old  Male who presents with a chief complaint of Stroke (30 Aug 2020 16:05)      SUBJECTIVE / OVERNIGHT EVENTS:    Pt reports his L sided weakness has improved but still unsteady on his feet. Denies any new symptoms or neuro deficits     ADDITIONAL REVIEW OF SYSTEMS:    RESPIRATORY: No cough, wheezing, chills or hemoptysis; No shortness of breath  CARDIOVASCULAR: No chest pain, palpitations, dizziness, or leg swelling  GASTROINTESTINAL: No abdominal or epigastric pain. No nausea, vomiting, or hematemesis; No diarrhea or constipation. No melena or hematochezia.      MEDICATIONS  (STANDING):  acetaminophen   Tablet .. 650 milliGRAM(s) Oral every 6 hours  allopurinol 100 milliGRAM(s) Oral daily  aspirin enteric coated 81 milliGRAM(s) Oral daily  atorvastatin 40 milliGRAM(s) Oral at bedtime  folic acid 1 milliGRAM(s) Oral daily  lidocaine   Patch 1 Patch Transdermal daily  pantoprazole    Tablet 40 milliGRAM(s) Oral before breakfast  rivaroxaban 15 milliGRAM(s) Oral with dinner  thiamine 100 milliGRAM(s) Oral daily    MEDICATIONS  (PRN):      CAPILLARY BLOOD GLUCOSE        I&O's Summary      PHYSICAL EXAM:  Vital Signs Last 24 Hrs  T(C): 36.8 (31 Aug 2020 13:30), Max: 36.8 (30 Aug 2020 17:27)  T(F): 98.2 (31 Aug 2020 13:30), Max: 98.3 (30 Aug 2020 23:55)  HR: 62 (31 Aug 2020 13:30) (58 - 70)  BP: 107/66 (31 Aug 2020 13:30) (107/66 - 128/81)  BP(mean): --  RR: 17 (31 Aug 2020 13:30) (17 - 19)  SpO2: 100% (31 Aug 2020 13:30) (99% - 100%)    CONSTITUTIONAL: NAD,   EYES: PERRLA; conjunctiva and sclera clear  ENMT: Moist oral mucosa, no pharyngeal injection or exudates;   NECK: Supple, no palpable masses;   RESPIRATORY: Normal respiratory effort; lungs are clear to auscultation bilaterally  CARDIOVASCULAR: Regular rate and rhythm, normal S1 and S2, no murmur/rub/gallop; No lower extremity edema; Peripheral pulses are 2+ bilaterally  ABDOMEN: Nontender to palpation, normoactive bowel sounds, no rebound/guarding;   MUSCLOSKELETAL:  no clubbing or cyanosis of digits; no joint swelling or tenderness to palpation  PSYCH: A+O to person, place, and time; affect appropriate  NEUROLOGY: CN 2-12 are intact and symmetric; no gross sensory deficits;   SKIN: No rashes; no palpable lesions    LABS:                        8.3    5.86  )-----------( 184      ( 31 Aug 2020 06:52 )             22.7     08    143  |  107  |  38<H>  ----------------------------<  96  3.7   |  23  |  2.20<H>    Ca    9.7      31 Aug 2020 06:52  Phos  2.7       Mg     2.2         TPro  7.5  /  Alb  4.3  /  TBili  0.4  /  DBili  x   /  AST  31  /  ALT  19  /  AlkPhos  55  08-30    PT/INR - ( 30 Aug 2020 10:40 )   PT: 18.0 SEC;   INR: 1.60          PTT - ( 30 Aug 2020 10:40 )  PTT:34.6 SEC      Urinalysis Basic - ( 30 Aug 2020 19:05 )    Color: LIGHT YELLOW / Appearance: CLEAR / S.039 / pH: 6.5  Gluc: NEGATIVE / Ketone: NEGATIVE  / Bili: NEGATIVE / Urobili: NORMAL   Blood: NEGATIVE / Protein: NEGATIVE / Nitrite: POSITIVE   Leuk Esterase: NEGATIVE / RBC: 0-2 / WBC 0-2   Sq Epi: OCC / Non Sq Epi: x / Bacteria: MODERATE          RADIOLOGY & ADDITIONAL TESTS:  Results Reviewed:   Imaging Personally Reviewed:  Electrocardiogram Personally Reviewed:    COORDINATION OF CARE:  Care Discussed with Consultants/Other Providers [Y/N]:  Prior or Outpatient Records Reviewed [Y/N]:

## 2020-08-31 NOTE — PROGRESS NOTE ADULT - PROBLEM SELECTOR PLAN 1
Appreciate Neurology recommendations  EP consulted to check PPM for MRI compatibility - if compatible then order MRI if not will order repeat CT.   Permissive HTN to 220/110  Aspirin/Statin  PT/OT and speech and swallow noted  TTE with bubble reviewed- normal LVEF, no PFO   Pain control for neck pain - Will get MRI of C-Spine from Neuro Sx recommendations

## 2020-08-31 NOTE — SBIRT NOTE ADULT - NSSBIRTBRIEFINTDET_GEN_A_CORE
Brief Intervention Performed. Screening results were reviewed with the patient who denies being an "alcy".  Pt does not feel he requires information on his drinking as he can stop when necessary.  All resources declined.

## 2020-08-31 NOTE — SBIRT NOTE ADULT - NSSBIRTDRGBRIEFINTDET_GEN_A_CORE
SW reviewed results of DAST-10.  Healthy guidelines discussed and again pt declining resources as "everyone does grass" and he does not feel it impacts his daily living.

## 2020-08-31 NOTE — PROGRESS NOTE ADULT - PROBLEM SELECTOR PLAN 2
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 Douglas for Acomm aneurysm. Please call the office to schedule a follow up visit [phone] upon discharge.

## 2020-09-01 LAB
ANION GAP SERPL CALC-SCNC: 15 MMO/L — HIGH (ref 7–14)
BUN SERPL-MCNC: 36 MG/DL — HIGH (ref 7–23)
CALCIUM SERPL-MCNC: 10.5 MG/DL — SIGNIFICANT CHANGE UP (ref 8.4–10.5)
CHLORIDE SERPL-SCNC: 105 MMOL/L — SIGNIFICANT CHANGE UP (ref 98–107)
CO2 SERPL-SCNC: 21 MMOL/L — LOW (ref 22–31)
CREAT SERPL-MCNC: 1.96 MG/DL — HIGH (ref 0.5–1.3)
GLUCOSE SERPL-MCNC: 94 MG/DL — SIGNIFICANT CHANGE UP (ref 70–99)
HCT VFR BLD CALC: 24.5 % — LOW (ref 39–50)
HGB BLD-MCNC: 8.7 G/DL — LOW (ref 13–17)
MAGNESIUM SERPL-MCNC: 2.1 MG/DL — SIGNIFICANT CHANGE UP (ref 1.6–2.6)
MCHC RBC-ENTMCNC: 35.5 % — SIGNIFICANT CHANGE UP (ref 32–36)
MCHC RBC-ENTMCNC: 35.7 PG — HIGH (ref 27–34)
MCV RBC AUTO: 100.4 FL — HIGH (ref 80–100)
NRBC # FLD: 0 K/UL — SIGNIFICANT CHANGE UP (ref 0–0)
PHOSPHATE SERPL-MCNC: 2.4 MG/DL — LOW (ref 2.5–4.5)
PLATELET # BLD AUTO: 159 K/UL — SIGNIFICANT CHANGE UP (ref 150–400)
PMV BLD: 9.6 FL — SIGNIFICANT CHANGE UP (ref 7–13)
POTASSIUM SERPL-MCNC: 3.7 MMOL/L — SIGNIFICANT CHANGE UP (ref 3.5–5.3)
POTASSIUM SERPL-SCNC: 3.7 MMOL/L — SIGNIFICANT CHANGE UP (ref 3.5–5.3)
RBC # BLD: 2.44 M/UL — LOW (ref 4.2–5.8)
RBC # FLD: 14.5 % — SIGNIFICANT CHANGE UP (ref 10.3–14.5)
SARS-COV-2 RNA SPEC QL NAA+PROBE: SIGNIFICANT CHANGE UP
SODIUM SERPL-SCNC: 141 MMOL/L — SIGNIFICANT CHANGE UP (ref 135–145)
WBC # BLD: 8.58 K/UL — SIGNIFICANT CHANGE UP (ref 3.8–10.5)
WBC # FLD AUTO: 8.58 K/UL — SIGNIFICANT CHANGE UP (ref 3.8–10.5)

## 2020-09-01 PROCEDURE — 99221 1ST HOSP IP/OBS SF/LOW 40: CPT

## 2020-09-01 PROCEDURE — 72125 CT NECK SPINE W/O DYE: CPT | Mod: 26

## 2020-09-01 PROCEDURE — 99232 SBSQ HOSP IP/OBS MODERATE 35: CPT

## 2020-09-01 RX ORDER — OXYCODONE HYDROCHLORIDE 5 MG/1
5 TABLET ORAL EVERY 4 HOURS
Refills: 0 | Status: DISCONTINUED | OUTPATIENT
Start: 2020-09-01 | End: 2020-09-03

## 2020-09-01 RX ORDER — MORPHINE SULFATE 50 MG/1
2 CAPSULE, EXTENDED RELEASE ORAL ONCE
Refills: 0 | Status: DISCONTINUED | OUTPATIENT
Start: 2020-09-01 | End: 2020-09-01

## 2020-09-01 RX ADMIN — ATORVASTATIN CALCIUM 40 MILLIGRAM(S): 80 TABLET, FILM COATED ORAL at 22:16

## 2020-09-01 RX ADMIN — Medication 100 MILLIGRAM(S): at 13:01

## 2020-09-01 RX ADMIN — MORPHINE SULFATE 2 MILLIGRAM(S): 50 CAPSULE, EXTENDED RELEASE ORAL at 08:55

## 2020-09-01 RX ADMIN — OXYCODONE HYDROCHLORIDE 5 MILLIGRAM(S): 5 TABLET ORAL at 17:48

## 2020-09-01 RX ADMIN — PANTOPRAZOLE SODIUM 40 MILLIGRAM(S): 20 TABLET, DELAYED RELEASE ORAL at 06:10

## 2020-09-01 RX ADMIN — Medication 1 MILLIGRAM(S): at 13:01

## 2020-09-01 RX ADMIN — Medication 81 MILLIGRAM(S): at 13:01

## 2020-09-01 RX ADMIN — Medication 650 MILLIGRAM(S): at 06:39

## 2020-09-01 RX ADMIN — OXYCODONE HYDROCHLORIDE 5 MILLIGRAM(S): 5 TABLET ORAL at 22:16

## 2020-09-01 RX ADMIN — Medication 650 MILLIGRAM(S): at 13:05

## 2020-09-01 RX ADMIN — LIDOCAINE 1 PATCH: 4 CREAM TOPICAL at 13:01

## 2020-09-01 RX ADMIN — Medication 650 MILLIGRAM(S): at 00:33

## 2020-09-01 RX ADMIN — Medication 650 MILLIGRAM(S): at 13:30

## 2020-09-01 RX ADMIN — Medication 650 MILLIGRAM(S): at 06:09

## 2020-09-01 RX ADMIN — MORPHINE SULFATE 2 MILLIGRAM(S): 50 CAPSULE, EXTENDED RELEASE ORAL at 09:15

## 2020-09-01 RX ADMIN — OXYCODONE HYDROCHLORIDE 5 MILLIGRAM(S): 5 TABLET ORAL at 18:23

## 2020-09-01 RX ADMIN — RIVAROXABAN 15 MILLIGRAM(S): KIT at 17:46

## 2020-09-01 RX ADMIN — OXYCODONE HYDROCHLORIDE 5 MILLIGRAM(S): 5 TABLET ORAL at 22:46

## 2020-09-01 RX ADMIN — Medication 650 MILLIGRAM(S): at 00:03

## 2020-09-01 NOTE — PROGRESS NOTE ADULT - PROBLEM SELECTOR PLAN 1
sequential CT head did not show new CVA. Unable to obtain MRI as ppm not compatible   -Aspirin/Statin  -PT/OT and speech and swallow noted  -TTE with bubble reviewed- normal LVEF, no PFO   -f/u neurology for further recs   -Pain control for neck pain with oxycodone prn. obtain CT c-spine r/o fx

## 2020-09-01 NOTE — PROGRESS NOTE ADULT - SUBJECTIVE AND OBJECTIVE BOX
[facility] Division of Hospital Medicine  Boy Claire MD  Pager [phone]      Patient is a 79y old  Male who presents with a chief complaint of Stroke (01 Sep 2020 11:21)      SUBJECTIVE / OVERNIGHT EVENTS:    No acute event. Pt reports his L sided weakness has resolved but still feel wobbly when he walks. Still has neck pain worse with head movement. Received iv morphine this am with good relief     ADDITIONAL REVIEW OF SYSTEMS:    RESPIRATORY: No cough, wheezing, chills or hemoptysis; No shortness of breath  CARDIOVASCULAR: No chest pain, palpitations, dizziness, or leg swelling  GASTROINTESTINAL: No abdominal or epigastric pain. No nausea, vomiting, or hematemesis; No diarrhea or constipation. No melena or hematochezia.      MEDICATIONS  (STANDING):  acetaminophen   Tablet .. 650 milliGRAM(s) Oral every 6 hours  allopurinol 100 milliGRAM(s) Oral daily  aspirin enteric coated 81 milliGRAM(s) Oral daily  atorvastatin 40 milliGRAM(s) Oral at bedtime  folic acid 1 milliGRAM(s) Oral daily  lidocaine   Patch 1 Patch Transdermal daily  pantoprazole    Tablet 40 milliGRAM(s) Oral before breakfast  rivaroxaban 15 milliGRAM(s) Oral with dinner  thiamine 100 milliGRAM(s) Oral daily    MEDICATIONS  (PRN):  oxyCODONE    IR 5 milliGRAM(s) Oral every 4 hours PRN Severe Pain (7 - 10)      CAPILLARY BLOOD GLUCOSE        I&O's Summary    31 Aug 2020 07:01  -  01 Sep 2020 07:00  --------------------------------------------------------  IN: 240 mL / OUT: 500 mL / NET: -260 mL        PHYSICAL EXAM:  Vital Signs Last 24 Hrs  T(C): 37.5 (01 Sep 2020 12:59), Max: 37.5 (01 Sep 2020 12:59)  T(F): 99.5 (01 Sep 2020 12:59), Max: 99.5 (01 Sep 2020 12:59)  HR: 80 (01 Sep 2020 12:59) (62 - 80)  BP: 119/67 (01 Sep 2020 12:59) (107/66 - 137/70)  BP(mean): --  RR: 18 (01 Sep 2020 12:59) (17 - 18)  SpO2: 100% (01 Sep 2020 12:59) (99% - 100%)    CONSTITUTIONAL: NAD,   EYES: PERRLA; conjunctiva and sclera clear  ENMT: Moist oral mucosa, no pharyngeal injection or exudates;   NECK: Supple, no palpable masses;   RESPIRATORY: Normal respiratory effort; lungs are clear to auscultation bilaterally  CARDIOVASCULAR: Regular rate and rhythm, normal S1 and S2, no murmur/rub/gallop; No lower extremity edema; Peripheral pulses are 2+ bilaterally  ABDOMEN: Nontender to palpation, normoactive bowel sounds, no rebound/guarding;   MUSCLOSKELETAL:  no clubbing or cyanosis of digits; no joint swelling or tenderness to palpation  PSYCH: A+O to person, place, and time; affect appropriate  NEUROLOGY: CN 2-12 are intact and symmetric; no gross sensory deficits;   SKIN: No rashes; no palpable lesions    LABS:                        8.7    8.58  )-----------( 159      ( 01 Sep 2020 05:50 )             24.5         141  |  105  |  36<H>  ----------------------------<  94  3.7   |  21<L>  |  1.96<H>    Ca    10.5      01 Sep 2020 05:50  Phos  2.4       Mg     2.1                 Urinalysis Basic - ( 30 Aug 2020 19:05 )    Color: LIGHT YELLOW / Appearance: CLEAR / S.039 / pH: 6.5  Gluc: NEGATIVE / Ketone: NEGATIVE  / Bili: NEGATIVE / Urobili: NORMAL   Blood: NEGATIVE / Protein: NEGATIVE / Nitrite: POSITIVE   Leuk Esterase: NEGATIVE / RBC: 0-2 / WBC 0-2   Sq Epi: OCC / Non Sq Epi: x / Bacteria: MODERATE          RADIOLOGY & ADDITIONAL TESTS:  Results Reviewed:   Imaging Personally Reviewed:  Electrocardiogram Personally Reviewed:    COORDINATION OF CARE:  Care Discussed with Consultants/Other Providers [Y/N]:  Prior or Outpatient Records Reviewed [Y/N]:

## 2020-09-01 NOTE — CONSULT NOTE ADULT - SUBJECTIVE AND OBJECTIVE BOX
Patient is a 79y old  Male who presents with a chief complaint of Stroke (31 Aug 2020 15:15)      HPI:  79 y.o male pmh of CAD s/p stents x 3 (), CKD, Afib on Xarelto, HTN,  s/p Medtronic PPM, myositis, presents with weakness and neck pain. Patient states that at 830AM he picked up a heavy object in the kitchen, put it down and started to have bilateral neck pain that was 11/10, sudden onset, shooting type of pain that radited to his mid spine. He tried to sit for a while but when he got up noticed his left arm was tingling and weak feeling and he could not walk well on his left leg. though pain had some mild improvement weakness did not and he came to the ER. He does note having a weak pain in both shoulders over past weeks but was not hte same. Of note reports that he is being scheduled for an endoscopy due to concern about blood in his stool. has had some weight loss unsure how much. Denies fever, chills, nausea, vomiting, brigid pain, palpitations, shaking/seizure activity, tongue biting, urinary incontinence, dizziness, syncope, fall, loc, head trauma, diplopia, sudden vision loss, diarrhea, brbpr, melena, dysuria hematuria, rash, weight gain, change in appetite, recent travel, sick contact or hospitalization. (30 Aug 2020 16:05)    Patient is a retired , still does some welding, but denies any history of weakness. He is right hand dominant. He was previously independent for ambulation and ADLS.   Pt reports 4/10 neck pain after receiving morphine IV, states pain was 10/10 prior to medication.     REVIEW OF SYSTEMS  Constitutional - No fever, No weight loss, No fatigue  HEENT - No eye pain, No visual disturbances, No difficulty hearing, No tinnitus, No vertigo, No neck pain  Respiratory - No cough, No wheezing, No shortness of breath  Cardiovascular - No chest pain, No palpitations  Gastrointestinal - No abdominal pain, No nausea, No vomiting, No diarrhea, + constipation (last bm 2 days ago)  Genitourinary - No dysuria, No frequency, No hematuria, No incontinence  Neurological - No headaches, No memory loss,+ loss of strength, No numbness, No tremors  Skin - No itching, No rashes, No lesions   Endocrine - No temperature intolerance  Musculoskeletal - + neck pain  Psychiatric - No depression, No anxiety    PAST MEDICAL & SURGICAL HISTORY  Myositis  Hyperlipidemia  Hypertension  GI bleed  Diverticulitis  No significant past surgical history      SOCIAL HISTORY  Smoking - former smoker  EtOH - daily 1-2 drinks   Drugs - D+ marijuana    FUNCTIONAL HISTORY  Lives with his wife in private house, 2 stairs to enter, 1 flight inside. can stay on main level if needed.  Independent at baseline    CURRENT FUNCTIONAL STATUS  min assist for ambulation     FAMILY HISTORY   Family history of cerebrovascular accident (CVA) (Sibling)  Family history of heart disease      RECENT LABS/IMAGING    < from: CT Head No Cont (20 @ 19:41) >  EXAM:  CT BRAIN        PROCEDURE DATE:  Aug 31 2020         INTERPRETATION:  INDICATIONS:  f/u CVA  . Left upper and lower extremity weakness.    TECHNIQUE:  Serial axial images were obtained from the skull base to the vertex without intravenous contrast. Coronal and sagittal reformatted images were obtained.    COMPARISON EXAMINATION: 2020    FINDINGS:  VENTRICLES AND SULCI:  Normal.    INTRA-AXIAL:  Areas of white matter hypodensity are seen within the cerebral hemispheres bilaterally compatible with mild microvascular-type changes. Calcium deposition is seen within the basal ganglia and dentate nuclei No mass, blood or abnormal attenuation is otherwise seen.    EXTRA-AXIAL:  No mass or collection is seen.    VISUALIZED SINUSES:  Right sphenoid foamy secretions. Mild ethmoid mucosal thickening    VISUALIZED MASTOIDS:  Clear.    CALVARIUM:  Normal.    MISCELLANEOUS:  None.      IMPRESSION:  Stable exam.    No mass effect, hemorrhage or evidence of acute intracranial pathology.        < end of copied text >    < from: CT Angio Neck w/ IV Cont (20 @ 10:59) >    EXAM:  CT ANGIO NECK (W)AW IC      EXAM:  CT ANGIO BRAIN (W)AW IC        PROCEDURE DATE:  Aug 30 2020         INTERPRETATION:  CLINICAL INFORMATION: Code stroke. Left upper and lower extremity weakness.    TECHNIQUE: Postcontrast axial CT images were acquired through the head. Additionally, contrast enhanced axial CT images were acquired from the aortic arch to the vertex of the calvarium, during the angiographic phase. 2-D and 3-dimensional maximum intensity projection reformats were generatedfrom the angiographic images. Postcontrast axial CT images were also acquired through the head. 90 cc of Omnipaque-350 cc were administered intravenously, without immediate complication. 10 cc were discarded.    COMPARISON: None.    FINDINGS:    CT ANGIOGRAPHY NECK:    There is good flow-related enhancement of the bilateral common and internal carotid arteries. The vertebral arteries are well visualized. Carotid bifurcations unremarkable. Origins of the common carotid and vertebral arteries are unremarkable.    There is no flow-limiting stenosis, evidence for arterial dissection, or vascular aneurysm.    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 The dome measures 4 mm in size. The neck is broad, measuring 4 to 5 mm in size.    There is good flow-related enhancement of the bilateral anterior, middle, and posterior cerebral arteries, and within the basilar artery. The intracranial / skull base internal carotid and intracranial vertebral arteries demonstrate normal flow-related enhancement.    Right PCA demonstrates a fetal origin. Large left P-comm with hypoplastic left P1 segment.    There is no flow-limiting stenosis. No AVM.    IMPRESSION:    CT ANGIOGRAPHY NECK:    No flow-limiting stenosis or evidence for arterial dissection.    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 The dome measures 4 mm in size. The neck is broad, measuring 4 to 5 mm in size.    No flow-limiting stenosis.      < end of copied text >      CBC Full  -  ( 01 Sep 2020 05:50 )  WBC Count : 8.58 K/uL  RBC Count : 2.44 M/uL  Hemoglobin : 8.7 g/dL  Hematocrit : 24.5 %  Platelet Count - Automated : 159 K/uL  Mean Cell Volume : 100.4 fL  Mean Cell Hemoglobin : 35.7 pg  Mean Cell Hemoglobin Concentration : 35.5 %  Auto Neutrophil # : x  Auto Lymphocyte # : x  Auto Monocyte # : x  Auto Eosinophil # : x  Auto Basophil # : x  Auto Neutrophil % : x  Auto Lymphocyte % : x  Auto Monocyte % : x  Auto Eosinophil % : x  Auto Basophil % : x        141  |  105  |  36<H>  ----------------------------<  94  3.7   |  21<L>  |  1.96<H>    Ca    10.5      01 Sep 2020 05:50  Phos  2.4       Mg     2.1           Urinalysis Basic - ( 30 Aug 2020 19:05 )    Color: LIGHT YELLOW / Appearance: CLEAR / S.039 / pH: 6.5  Gluc: NEGATIVE / Ketone: NEGATIVE  / Bili: NEGATIVE / Urobili: NORMAL   Blood: NEGATIVE / Protein: NEGATIVE / Nitrite: POSITIVE   Leuk Esterase: NEGATIVE / RBC: 0-2 / WBC 0-2   Sq Epi: OCC / Non Sq Epi: x / Bacteria: MODERATE        VITALS  T(C): 36.6 (20 @ 06:01), Max: 36.8 (20 @ 13:30)  HR: 65 (20 @ 06:01) (62 - 66)  BP: 124/66 (20 @ 06:01) (107/66 - 137/70)  RR: 18 (20 @ 06:01) (17 - 18)  SpO2: 99% (20 @ 06:01) (99% - 100%)  Wt(kg): --    ALLERGIES  No Known Allergies      MEDICATIONS   acetaminophen   Tablet .. 650 milliGRAM(s) Oral every 6 hours  allopurinol 100 milliGRAM(s) Oral daily  aspirin enteric coated 81 milliGRAM(s) Oral daily  atorvastatin 40 milliGRAM(s) Oral at bedtime  folic acid 1 milliGRAM(s) Oral daily  lidocaine   Patch 1 Patch Transdermal daily  pantoprazole    Tablet 40 milliGRAM(s) Oral before breakfast  rivaroxaban 15 milliGRAM(s) Oral with dinner  thiamine 100 milliGRAM(s) Oral daily      ----------------------------------------------------------------------------------------  PHYSICAL EXAM  Constitutional - NAD, Comfortable  HEENT - NCAT, EOMI  Neck - Supple, No limited ROM  Chest - no respiratory distress  Cardiovascular -  S1S2   Abdomen -  Soft, NTND  Extremities - No C/C/E, No calf tenderness   Neurologic Exam -                    Cognitive - Awake, Alert, AAO to self, place, date, year, situation     Communication - Fluent, No dysarthria     Cranial Nerves - CN 2-12 intact     Motor -                      LEFT    UE - ShAB 4+/5, EF 4/5, EE 4/5, WE 4/5,  4/5                    RIGHT UE - ShAB 5/5, EF 5/5, EE 5/5, WE 5/5,  5/5                    LEFT    LE - HF 4+/5, KE 4+/5, DF 4+/5, PF 4+/5                    RIGHT LE - HF 5/5, KE 5/5, DF 5/5, PF 5/5        Sensory - Intact to LT      Coordination - FTN intact on right, slight difficulty on left     OculoVestibular - No saccades, No nystagmus, VOR         Balance - WNL Static  Psychiatric - Mood stable, Affect WNL  ----------------------------------------------------------------------------------------  ASSESSMENT/PLAN  79 y.o male pmh of CAD s/p stents x 3 (), CKD, Afib on Xarelto, HTN,  s/p Medtronic PPM, myositis, presents with weakness and neck pain.  CT head negative, MRI unable to be obtained due to PPM.   ct angio revealed acomm aneurysm, no acute intervention per neurosurgery (to follow up outpatient)  CT neck pending  Pain -s/p IV morphine. orders for tylenol, lidocaine patch  DVT PPX - xarelto (for nonvalvular afib)  Rehab - pending progress with bedside PT.  PT to assess with RW, if stability improves, patient prefers for discharge home with home PT.  Patient is currently requiring min assist for ambulation without device.    Patient would need to be off IV pain medication for discharge to rehab.  diagnosis pending, if still requires assistance with ambulation can consider for acute inpatient rehab.      Recommend ACUTE inpatient rehabilitation for the functional deficits consisting of 3 hours of therapy/day & 24 hour RN/daily PMR physician for comorbid medical management. Will continue to follow for ongoing rehab needs and recommendations.    Plan discussed with patient, who prefers discharge home, however if requires rehab he states he would prefer [facility] near his home.   will continue to follow.

## 2020-09-01 NOTE — PROGRESS NOTE ADULT - PROBLEM SELECTOR PLAN 2
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 Douglas for Acomm aneurysm. Please call the office to schedule a follow up visit [phone] upon discharge.

## 2020-09-02 DIAGNOSIS — M48.02 SPINAL STENOSIS, CERVICAL REGION: ICD-10-CM

## 2020-09-02 LAB
ANION GAP SERPL CALC-SCNC: 12 MMO/L — SIGNIFICANT CHANGE UP (ref 7–14)
BASOPHILS # BLD AUTO: 0.02 K/UL — SIGNIFICANT CHANGE UP (ref 0–0.2)
BASOPHILS NFR BLD AUTO: 0.2 % — SIGNIFICANT CHANGE UP (ref 0–2)
BLD GP AB SCN SERPL QL: NEGATIVE — SIGNIFICANT CHANGE UP
BUN SERPL-MCNC: 37 MG/DL — HIGH (ref 7–23)
CALCIUM SERPL-MCNC: 9.6 MG/DL — SIGNIFICANT CHANGE UP (ref 8.4–10.5)
CHLORIDE SERPL-SCNC: 100 MMOL/L — SIGNIFICANT CHANGE UP (ref 98–107)
CO2 SERPL-SCNC: 23 MMOL/L — SIGNIFICANT CHANGE UP (ref 22–31)
CREAT SERPL-MCNC: 2.07 MG/DL — HIGH (ref 0.5–1.3)
EOSINOPHIL # BLD AUTO: 0.01 K/UL — SIGNIFICANT CHANGE UP (ref 0–0.5)
EOSINOPHIL NFR BLD AUTO: 0.1 % — SIGNIFICANT CHANGE UP (ref 0–6)
GLUCOSE SERPL-MCNC: 89 MG/DL — SIGNIFICANT CHANGE UP (ref 70–99)
HCT VFR BLD CALC: 21.6 % — LOW (ref 39–50)
HCT VFR BLD CALC: 22.5 % — LOW (ref 39–50)
HGB BLD-MCNC: 7.7 G/DL — LOW (ref 13–17)
HGB BLD-MCNC: 8.1 G/DL — LOW (ref 13–17)
IMM GRANULOCYTES NFR BLD AUTO: 0.7 % — SIGNIFICANT CHANGE UP (ref 0–1.5)
LYMPHOCYTES # BLD AUTO: 0.38 K/UL — LOW (ref 1–3.3)
LYMPHOCYTES # BLD AUTO: 4.7 % — LOW (ref 13–44)
MAGNESIUM SERPL-MCNC: 2 MG/DL — SIGNIFICANT CHANGE UP (ref 1.6–2.6)
MCHC RBC-ENTMCNC: 35.6 % — SIGNIFICANT CHANGE UP (ref 32–36)
MCHC RBC-ENTMCNC: 36 % — SIGNIFICANT CHANGE UP (ref 32–36)
MCHC RBC-ENTMCNC: 36 PG — HIGH (ref 27–34)
MCHC RBC-ENTMCNC: 36.3 PG — HIGH (ref 27–34)
MCV RBC AUTO: 100 FL — SIGNIFICANT CHANGE UP (ref 80–100)
MCV RBC AUTO: 101.9 FL — HIGH (ref 80–100)
MONOCYTES # BLD AUTO: 0.4 K/UL — SIGNIFICANT CHANGE UP (ref 0–0.9)
MONOCYTES NFR BLD AUTO: 5 % — SIGNIFICANT CHANGE UP (ref 2–14)
NEUTROPHILS # BLD AUTO: 7.15 K/UL — SIGNIFICANT CHANGE UP (ref 1.8–7.4)
NEUTROPHILS NFR BLD AUTO: 89.3 % — HIGH (ref 43–77)
NRBC # FLD: 0 K/UL — SIGNIFICANT CHANGE UP (ref 0–0)
NRBC # FLD: 0 K/UL — SIGNIFICANT CHANGE UP (ref 0–0)
PLATELET # BLD AUTO: 130 K/UL — LOW (ref 150–400)
PLATELET # BLD AUTO: 131 K/UL — LOW (ref 150–400)
PMV BLD: 10.8 FL — SIGNIFICANT CHANGE UP (ref 7–13)
PMV BLD: 9.5 FL — SIGNIFICANT CHANGE UP (ref 7–13)
POTASSIUM SERPL-MCNC: 3.7 MMOL/L — SIGNIFICANT CHANGE UP (ref 3.5–5.3)
POTASSIUM SERPL-SCNC: 3.7 MMOL/L — SIGNIFICANT CHANGE UP (ref 3.5–5.3)
RBC # BLD: 2.12 M/UL — LOW (ref 4.2–5.8)
RBC # BLD: 2.25 M/UL — LOW (ref 4.2–5.8)
RBC # FLD: 14.4 % — SIGNIFICANT CHANGE UP (ref 10.3–14.5)
RBC # FLD: 14.4 % — SIGNIFICANT CHANGE UP (ref 10.3–14.5)
RH IG SCN BLD-IMP: POSITIVE — SIGNIFICANT CHANGE UP
SODIUM SERPL-SCNC: 135 MMOL/L — SIGNIFICANT CHANGE UP (ref 135–145)
WBC # BLD: 7.08 K/UL — SIGNIFICANT CHANGE UP (ref 3.8–10.5)
WBC # BLD: 8.02 K/UL — SIGNIFICANT CHANGE UP (ref 3.8–10.5)
WBC # FLD AUTO: 7.08 K/UL — SIGNIFICANT CHANGE UP (ref 3.8–10.5)
WBC # FLD AUTO: 8.02 K/UL — SIGNIFICANT CHANGE UP (ref 3.8–10.5)

## 2020-09-02 PROCEDURE — 99232 SBSQ HOSP IP/OBS MODERATE 35: CPT

## 2020-09-02 PROCEDURE — 99233 SBSQ HOSP IP/OBS HIGH 50: CPT

## 2020-09-02 RX ADMIN — Medication 650 MILLIGRAM(S): at 12:42

## 2020-09-02 RX ADMIN — ATORVASTATIN CALCIUM 40 MILLIGRAM(S): 80 TABLET, FILM COATED ORAL at 23:11

## 2020-09-02 RX ADMIN — Medication 650 MILLIGRAM(S): at 19:05

## 2020-09-02 RX ADMIN — Medication 650 MILLIGRAM(S): at 13:15

## 2020-09-02 RX ADMIN — Medication 1 MILLIGRAM(S): at 12:42

## 2020-09-02 RX ADMIN — Medication 100 MILLIGRAM(S): at 12:43

## 2020-09-02 RX ADMIN — Medication 81 MILLIGRAM(S): at 12:43

## 2020-09-02 RX ADMIN — PANTOPRAZOLE SODIUM 40 MILLIGRAM(S): 20 TABLET, DELAYED RELEASE ORAL at 05:56

## 2020-09-02 RX ADMIN — OXYCODONE HYDROCHLORIDE 5 MILLIGRAM(S): 5 TABLET ORAL at 23:14

## 2020-09-02 RX ADMIN — OXYCODONE HYDROCHLORIDE 5 MILLIGRAM(S): 5 TABLET ORAL at 23:45

## 2020-09-02 RX ADMIN — LIDOCAINE 1 PATCH: 4 CREAM TOPICAL at 19:58

## 2020-09-02 RX ADMIN — RIVAROXABAN 15 MILLIGRAM(S): KIT at 18:32

## 2020-09-02 RX ADMIN — Medication 650 MILLIGRAM(S): at 05:56

## 2020-09-02 RX ADMIN — LIDOCAINE 1 PATCH: 4 CREAM TOPICAL at 12:42

## 2020-09-02 RX ADMIN — Medication 650 MILLIGRAM(S): at 18:32

## 2020-09-02 NOTE — PROGRESS NOTE ADULT - SUBJECTIVE AND OBJECTIVE BOX
[facility] Division of Hospital Medicine  Boy Claire MD  Pager [phone]      Patient is a 79y old  Male who presents with a chief complaint of Stroke (02 Sep 2020 10:27)      SUBJECTIVE / OVERNIGHT EVENTS:    No acute event o/n. reports neck/shoulder pain has improved. still has mild L sided weakness, denies numbness/tingling     ADDITIONAL REVIEW OF SYSTEMS:    RESPIRATORY: No cough, wheezing, chills or hemoptysis; No shortness of breath  CARDIOVASCULAR: No chest pain, palpitations, dizziness, or leg swelling  GASTROINTESTINAL: No abdominal or epigastric pain. No nausea, vomiting, or hematemesis; No diarrhea or constipation. No melena or hematochezia.      MEDICATIONS  (STANDING):  acetaminophen   Tablet .. 650 milliGRAM(s) Oral every 6 hours  allopurinol 100 milliGRAM(s) Oral daily  aspirin enteric coated 81 milliGRAM(s) Oral daily  atorvastatin 40 milliGRAM(s) Oral at bedtime  folic acid 1 milliGRAM(s) Oral daily  lidocaine   Patch 1 Patch Transdermal daily  pantoprazole    Tablet 40 milliGRAM(s) Oral before breakfast  rivaroxaban 15 milliGRAM(s) Oral with dinner  thiamine 100 milliGRAM(s) Oral daily    MEDICATIONS  (PRN):  oxyCODONE    IR 5 milliGRAM(s) Oral every 4 hours PRN Severe Pain (7 - 10)      CAPILLARY BLOOD GLUCOSE        I&O's Summary      PHYSICAL EXAM:  Vital Signs Last 24 Hrs  T(C): 36.8 (02 Sep 2020 12:41), Max: 37.2 (01 Sep 2020 17:45)  T(F): 98.2 (02 Sep 2020 12:41), Max: 99 (01 Sep 2020 22:15)  HR: 73 (02 Sep 2020 12:41) (73 - 81)  BP: 113/68 (02 Sep 2020 12:41) (110/60 - 120/61)  BP(mean): --  RR: 16 (02 Sep 2020 12:41) (16 - 17)  SpO2: 100% (02 Sep 2020 12:41) (100% - 100%)    CONSTITUTIONAL: NAD,   EYES: PERRLA; conjunctiva and sclera clear  ENMT: Moist oral mucosa, no pharyngeal injection or exudates;   NECK: Supple, no palpable masses;   RESPIRATORY: Normal respiratory effort; lungs are clear to auscultation bilaterally  CARDIOVASCULAR: Regular rate and rhythm, normal S1 and S2, no murmur/rub/gallop; No lower extremity edema; Peripheral pulses are 2+ bilaterally  ABDOMEN: Nontender to palpation, normoactive bowel sounds, no rebound/guarding;   MUSCLOSKELETAL:  no clubbing or cyanosis of digits; no joint swelling or tenderness to palpation  PSYCH: A+O to person, place, and time; affect appropriate  NEUROLOGY: CN 2-12 are intact and symmetric; no gross sensory deficits; unsteady gait   SKIN: No rashes; no palpable lesions    LABS:                        8.1    7.08  )-----------( 130      ( 02 Sep 2020 11:20 )             22.5     09-02    135  |  100  |  37<H>  ----------------------------<  89  3.7   |  23  |  2.07<H>    Ca    9.6      02 Sep 2020 06:17  Phos  2.4     09-01  Mg     2.0     09-02                  RADIOLOGY & ADDITIONAL TESTS:  Results Reviewed:   Imaging Personally Reviewed:  Electrocardiogram Personally Reviewed:    COORDINATION OF CARE:  Care Discussed with Consultants/Other Providers [Y/N]:  Prior or Outpatient Records Reviewed [Y/N]:

## 2020-09-02 NOTE — PROGRESS NOTE ADULT - PROBLEM SELECTOR PLAN 1
sequential CT head did not show new CVA. Unable to obtain MRI as ppm not compatible   -Aspirin/Statin  -PT/OT and speech and swallow noted- acute rehab  -TTE with bubble reviewed- normal LVEF, no PFO   -f/u neurology for further recs

## 2020-09-02 NOTE — PROGRESS NOTE ADULT - PROBLEM SELECTOR PLAN 2
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 Douglas for Acomm aneurysm. Please call the office to schedule a follow up visit [phone] upon discharge.

## 2020-09-02 NOTE — PROGRESS NOTE ADULT - SUBJECTIVE AND OBJECTIVE BOX
Patient is a 79y old  Male who presents with a chief complaint of Stroke (01 Sep 2020 13:08)    Pt reports pain improved today and is controlled with oral medications (3-4/10). He also reports some L back/rib discomfort with cough or clearing his throat.      REVIEW OF SYSTEMS  Constitutional - No fever, No weight loss, No fatigue  HEENT - No eye pain, No visual disturbances, No difficulty hearing, No tinnitus, No vertigo, No neck pain  Respiratory - No cough, No wheezing, No shortness of breath  Cardiovascular - No chest pain, No palpitations  Gastrointestinal - No abdominal pain, No nausea, No vomiting, No diarrhea, No constipation  Genitourinary - No dysuria, No frequency, No hematuria, No incontinence  Neurological - No headaches, No memory loss, No loss of strength, No numbness, No tremors  Skin - No itching, No rashes, No lesions   Endocrine - No temperature intolerance  Musculoskeletal - + neck pain  Psychiatric - No depression, No anxiety    PAST MEDICAL & SURGICAL HISTORY  Myositis  Hyperlipidemia  Hypertension  GI bleed  Diverticulitis  No significant past surgical history    CURRENT FUNCTIONAL STATUS  min assist    FAMILY HISTORY   Family history of cerebrovascular accident (CVA) (Sibling)  Family history of heart disease      RECENT LABS/IMAGING    < from: CT Cervical Spine No Cont (09.01.20 @ 16:32) >  EXAM:  CT CERVICAL SPINE        PROCEDURE DATE:  Sep  1 2020         INTERPRETATION:  .    CLINICAL INFORMATION: neck pain, inability to move neck s/p fall. Evaluate for possible fracture.    TECHNIQUE: Axial sections were obtained through the cervical spine without the administration of intravenous contrast. Sagittal and coronal reformatted images were obtained from the source data.    COMPARISON: No prior CT examinations of the cervical spine are available for comparison. Comparison is made to a prior CT angiogram examination of the neck from 8/30/2020.    FINDINGS: No acute fractures or dislocations are notable. There is very mild unchanged retrolisthesis of C3 on C4 and C4 on C5 and C5 on C6 There is no loss of vertebral body height. Scattered degenerative lucencies and areas of sclerosis are notable throughout the vertebral bodies and facets. Multilevel degenerative disc disease is noted, worst at the C4-C5, C5-C6, and at the C6-C7 levels with reactive endplate changes. Multilevel facet arthrosis is notable. The dens is intact. The lateral masses of C1 are not displaced. There is no prevertebral soft tissue swelling.    Evaluation of the intraspinal contents is limited on CT modality. There are variable degrees of multilevel canal and foraminal stenosis secondary to endplate degenerative changes and facet arthrosis. Severe canal stenosis is seen at C4-C5.    Arterial vascular calcifications are seen.  Biapical pleural thickening and/or scarring are seen along with small paraseptal cysts. A left-sided cardiac pacemaker is visualized on the  lateral view.    IMPRESSION: No acute fractures or dislocations.    Multilevel cervical spondylosis with severe canal stenosis at C4-C5.              < end of copied text >    CBC Full  -  ( 02 Sep 2020 06:17 )  WBC Count : 8.02 K/uL  RBC Count : 2.12 M/uL  Hemoglobin : 7.7 g/dL  Hematocrit : 21.6 %  Platelet Count - Automated : 131 K/uL  Mean Cell Volume : 101.9 fL  Mean Cell Hemoglobin : 36.3 pg  Mean Cell Hemoglobin Concentration : 35.6 %  Auto Neutrophil # : 7.15 K/uL  Auto Lymphocyte # : 0.38 K/uL  Auto Monocyte # : 0.40 K/uL  Auto Eosinophil # : 0.01 K/uL  Auto Basophil # : 0.02 K/uL  Auto Neutrophil % : 89.3 %  Auto Lymphocyte % : 4.7 %  Auto Monocyte % : 5.0 %  Auto Eosinophil % : 0.1 %  Auto Basophil % : 0.2 %    09-02    135  |  100  |  37<H>  ----------------------------<  89  3.7   |  23  |  2.07<H>    Ca    9.6      02 Sep 2020 06:17  Phos  2.4     09-01  Mg     2.0     09-02          VITALS  T(C): 37.2 (09-02-20 @ 05:40), Max: 37.5 (09-01-20 @ 12:59)  HR: 76 (09-02-20 @ 05:40) (76 - 81)  BP: 114/64 (09-02-20 @ 05:40) (110/60 - 120/61)  RR: 17 (09-02-20 @ 05:40) (17 - 18)  SpO2: 100% (09-02-20 @ 05:40) (100% - 100%)  Wt(kg): --    ALLERGIES  No Known Allergies      MEDICATIONS   acetaminophen   Tablet .. 650 milliGRAM(s) Oral every 6 hours  allopurinol 100 milliGRAM(s) Oral daily  aspirin enteric coated 81 milliGRAM(s) Oral daily  atorvastatin 40 milliGRAM(s) Oral at bedtime  folic acid 1 milliGRAM(s) Oral daily  lidocaine   Patch 1 Patch Transdermal daily  oxyCODONE    IR 5 milliGRAM(s) Oral every 4 hours PRN  pantoprazole    Tablet 40 milliGRAM(s) Oral before breakfast  rivaroxaban 15 milliGRAM(s) Oral with dinner  thiamine 100 milliGRAM(s) Oral daily      ----------------------------------------------------------------------------------------  PHYSICAL EXAM  Constitutional - NAD, Comfortable  HEENT - NCAT, EOMI  Neck - Supple, No limited ROM  Chest - no respiratory distress  Abdomen -  Soft, NTND  Extremities - No C/C/E, No calf tenderness   Neurologic Exam -                    Cognitive - Awake, Alert, AAO to self, place, date, year, situation     Communication - Fluent, No dysarthria     Cranial Nerves - CN 2-12 intact     Motor -                      LEFT    UE - ShAB 4+/5, EF 4/5, EE 4/5, WE 4/5,  4/5                    RIGHT UE - ShAB 5/5, EF 5/5, EE 5/5, WE 5/5,  5/5                    LEFT    LE - HF 4+/5, KE 4+/5, DF 4+/5, PF 4+/5                    RIGHT LE - HF 5/5, KE 5/5, DF 5/5, PF 5/5        Sensory - Intact to LT      OculoVestibular - No saccades, No nystagmus, VOR         Balance - WNL Static  Psychiatric - Mood stable, Affect WNL  ----------------------------------------------------------------------------------------  ASSESSMENT/PLAN  79 y.o male pmh of CAD s/p stents x 3 (2005), CKD, Afib on Xarelto, HTN,  s/p [employer] PPM, myositis, presents with weakness and neck pain.  CT head negative, MRI unable to be obtained due to PPM.   ct angio revealed acomm aneurysm, no acute intervention per neurosurgery (to follow up outpatient)  clinical cva: asa, statin  Pain -oxy IR,  tylenol, lidocaine patch, please add bowel regimen. last bm was 4 days ago per patient.  DVT PPX - xarelto (for nonvalvular afib)      Patient is currently requiring min assist for ambulation without device.    Recommend ACUTE inpatient rehabilitation for the functional deficits consisting of 3 hours of therapy/day & 24 hour RN/daily PMR physician for comorbid medical management. Will continue to follow for ongoing rehab needs and recommendations. Patient is a 79y old  Male who presents with a chief complaint of Stroke (01 Sep 2020 13:08)    Pt reports pain improved today and is controlled with oral medications (3-4/10). He also reports some L back/rib discomfort with cough or clearing his throat.      REVIEW OF SYSTEMS  Constitutional - No fever, No weight loss, No fatigue  HEENT - No eye pain, No visual disturbances, No difficulty hearing, No tinnitus, No vertigo, No neck pain  Respiratory - No cough, No wheezing, No shortness of breath  Cardiovascular - No chest pain, No palpitations  Gastrointestinal - No abdominal pain, No nausea, No vomiting, No diarrhea, No constipation  Genitourinary - No dysuria, No frequency, No hematuria, No incontinence  Neurological - No headaches, No memory loss, + loss of strength, No numbness, No tremors  Skin - No itching, No rashes, No lesions   Endocrine - No temperature intolerance  Musculoskeletal - + neck pain  Psychiatric - No depression, No anxiety    PAST MEDICAL & SURGICAL HISTORY  Myositis  Hyperlipidemia  Hypertension  GI bleed  Diverticulitis  No significant past surgical history    CURRENT FUNCTIONAL STATUS  min assist    FAMILY HISTORY   Family history of cerebrovascular accident (CVA) (Sibling)  Family history of heart disease      RECENT LABS/IMAGING    < from: CT Cervical Spine No Cont (09.01.20 @ 16:32) >  EXAM:  CT CERVICAL SPINE        PROCEDURE DATE:  Sep  1 2020         INTERPRETATION:  .    CLINICAL INFORMATION: neck pain, inability to move neck s/p fall. Evaluate for possible fracture.    TECHNIQUE: Axial sections were obtained through the cervical spine without the administration of intravenous contrast. Sagittal and coronal reformatted images were obtained from the source data.    COMPARISON: No prior CT examinations of the cervical spine are available for comparison. Comparison is made to a prior CT angiogram examination of the neck from 8/30/2020.    FINDINGS: No acute fractures or dislocations are notable. There is very mild unchanged retrolisthesis of C3 on C4 and C4 on C5 and C5 on C6 There is no loss of vertebral body height. Scattered degenerative lucencies and areas of sclerosis are notable throughout the vertebral bodies and facets. Multilevel degenerative disc disease is noted, worst at the C4-C5, C5-C6, and at the C6-C7 levels with reactive endplate changes. Multilevel facet arthrosis is notable. The dens is intact. The lateral masses of C1 are not displaced. There is no prevertebral soft tissue swelling.    Evaluation of the intraspinal contents is limited on CT modality. There are variable degrees of multilevel canal and foraminal stenosis secondary to endplate degenerative changes and facet arthrosis. Severe canal stenosis is seen at C4-C5.    Arterial vascular calcifications are seen.  Biapical pleural thickening and/or scarring are seen along with small paraseptal cysts. A left-sided cardiac pacemaker is visualized on the  lateral view.    IMPRESSION: No acute fractures or dislocations.    Multilevel cervical spondylosis with severe canal stenosis at C4-C5.              < end of copied text >    CBC Full  -  ( 02 Sep 2020 06:17 )  WBC Count : 8.02 K/uL  RBC Count : 2.12 M/uL  Hemoglobin : 7.7 g/dL  Hematocrit : 21.6 %  Platelet Count - Automated : 131 K/uL  Mean Cell Volume : 101.9 fL  Mean Cell Hemoglobin : 36.3 pg  Mean Cell Hemoglobin Concentration : 35.6 %  Auto Neutrophil # : 7.15 K/uL  Auto Lymphocyte # : 0.38 K/uL  Auto Monocyte # : 0.40 K/uL  Auto Eosinophil # : 0.01 K/uL  Auto Basophil # : 0.02 K/uL  Auto Neutrophil % : 89.3 %  Auto Lymphocyte % : 4.7 %  Auto Monocyte % : 5.0 %  Auto Eosinophil % : 0.1 %  Auto Basophil % : 0.2 %    09-02    135  |  100  |  37<H>  ----------------------------<  89  3.7   |  23  |  2.07<H>    Ca    9.6      02 Sep 2020 06:17  Phos  2.4     09-01  Mg     2.0     09-02          VITALS  T(C): 37.2 (09-02-20 @ 05:40), Max: 37.5 (09-01-20 @ 12:59)  HR: 76 (09-02-20 @ 05:40) (76 - 81)  BP: 114/64 (09-02-20 @ 05:40) (110/60 - 120/61)  RR: 17 (09-02-20 @ 05:40) (17 - 18)  SpO2: 100% (09-02-20 @ 05:40) (100% - 100%)  Wt(kg): --    ALLERGIES  No Known Allergies      MEDICATIONS   acetaminophen   Tablet .. 650 milliGRAM(s) Oral every 6 hours  allopurinol 100 milliGRAM(s) Oral daily  aspirin enteric coated 81 milliGRAM(s) Oral daily  atorvastatin 40 milliGRAM(s) Oral at bedtime  folic acid 1 milliGRAM(s) Oral daily  lidocaine   Patch 1 Patch Transdermal daily  oxyCODONE    IR 5 milliGRAM(s) Oral every 4 hours PRN  pantoprazole    Tablet 40 milliGRAM(s) Oral before breakfast  rivaroxaban 15 milliGRAM(s) Oral with dinner  thiamine 100 milliGRAM(s) Oral daily      ----------------------------------------------------------------------------------------  PHYSICAL EXAM  Constitutional - NAD, Comfortable  HEENT - NCAT, EOMI  Neck - pain with ROM  Chest - no respiratory distress  Abdomen -  Soft, NTND  Extremities - No C/C/E, No calf tenderness   Neurologic Exam -                    Cognitive - Awake, Alert, AAO to self, place, date, year, situation     Communication - Fluent, No dysarthria     Cranial Nerves - CN 2-12 intact     Motor -                      LEFT    UE - ShAB 4+/5, EF 4/5, EE 4/5, WE 4/5,  4/5                    RIGHT UE - ShAB 5/5, EF 5/5, EE 5/5, WE 5/5,  5/5                    LEFT    LE - HF 4+/5, KE 4+/5, DF 4+/5, PF 4+/5                    RIGHT LE - HF 5/5, KE 5/5, DF 5/5, PF 5/5        Sensory - Intact to LT      OculoVestibular - No saccades, No nystagmus, VOR         Balance - WNL Static  Psychiatric - Mood stable, Affect WNL  ----------------------------------------------------------------------------------------  ASSESSMENT/PLAN  79 y.o male pmh of CAD s/p stents x 3 (2005), CKD, Afib on Xarelto, HTN,  s/p [employer] PPM, myositis, presents with weakness and neck pain.  CT head negative, MRI unable to be obtained due to PPM.   ct angio revealed acomm aneurysm, no acute intervention per neurosurgery (to follow up outpatient)  clinical cva: asa, statin  Pain -oxy IR,  tylenol, lidocaine patch, please add bowel regimen. last bm was 4 days ago per patient.  DVT PPX - xarelto (for nonvalvular afib)      Patient is currently requiring min assist for ambulation without device.    Recommend ACUTE inpatient rehabilitation for the functional deficits consisting of 3 hours of therapy/day & 24 hour RN/daily PMR physician for comorbid medical management. Will continue to follow for ongoing rehab needs and recommendations. Patient is a 79y old  Male who presents with a chief complaint of Stroke (01 Sep 2020 13:08)    Pt reports pain improved today and is controlled with oral medications (3-4/10). He also reports some L back/rib discomfort with cough or clearing his throat.      REVIEW OF SYSTEMS  Constitutional - No fever, No weight loss, No fatigue  HEENT - No eye pain, No visual disturbances, No difficulty hearing, No tinnitus, No vertigo, No neck pain  Respiratory - No cough, No wheezing, No shortness of breath  Cardiovascular - No chest pain, No palpitations  Gastrointestinal - No abdominal pain, No nausea, No vomiting, No diarrhea, No constipation  Genitourinary - No dysuria, No frequency, No hematuria, No incontinence  Neurological - No headaches, No memory loss, + loss of strength, No numbness, No tremors  Skin - No itching, No rashes, No lesions   Endocrine - No temperature intolerance  Musculoskeletal - + neck pain  Psychiatric - No depression, No anxiety    PAST MEDICAL & SURGICAL HISTORY  Myositis  Hyperlipidemia  Hypertension  GI bleed  Diverticulitis  No significant past surgical history    CURRENT FUNCTIONAL STATUS  min assist    FAMILY HISTORY   Family history of cerebrovascular accident (CVA) (Sibling)  Family history of heart disease      RECENT LABS/IMAGING    < from: CT Cervical Spine No Cont (09.01.20 @ 16:32) >  EXAM:  CT CERVICAL SPINE        PROCEDURE DATE:  Sep  1 2020         INTERPRETATION:  .    CLINICAL INFORMATION: neck pain, inability to move neck s/p fall. Evaluate for possible fracture.    TECHNIQUE: Axial sections were obtained through the cervical spine without the administration of intravenous contrast. Sagittal and coronal reformatted images were obtained from the source data.    COMPARISON: No prior CT examinations of the cervical spine are available for comparison. Comparison is made to a prior CT angiogram examination of the neck from 8/30/2020.    FINDINGS: No acute fractures or dislocations are notable. There is very mild unchanged retrolisthesis of C3 on C4 and C4 on C5 and C5 on C6 There is no loss of vertebral body height. Scattered degenerative lucencies and areas of sclerosis are notable throughout the vertebral bodies and facets. Multilevel degenerative disc disease is noted, worst at the C4-C5, C5-C6, and at the C6-C7 levels with reactive endplate changes. Multilevel facet arthrosis is notable. The dens is intact. The lateral masses of C1 are not displaced. There is no prevertebral soft tissue swelling.    Evaluation of the intraspinal contents is limited on CT modality. There are variable degrees of multilevel canal and foraminal stenosis secondary to endplate degenerative changes and facet arthrosis. Severe canal stenosis is seen at C4-C5.    Arterial vascular calcifications are seen.  Biapical pleural thickening and/or scarring are seen along with small paraseptal cysts. A left-sided cardiac pacemaker is visualized on the  lateral view.    IMPRESSION: No acute fractures or dislocations.    Multilevel cervical spondylosis with severe canal stenosis at C4-C5.              < end of copied text >    CBC Full  -  ( 02 Sep 2020 06:17 )  WBC Count : 8.02 K/uL  RBC Count : 2.12 M/uL  Hemoglobin : 7.7 g/dL  Hematocrit : 21.6 %  Platelet Count - Automated : 131 K/uL  Mean Cell Volume : 101.9 fL  Mean Cell Hemoglobin : 36.3 pg  Mean Cell Hemoglobin Concentration : 35.6 %  Auto Neutrophil # : 7.15 K/uL  Auto Lymphocyte # : 0.38 K/uL  Auto Monocyte # : 0.40 K/uL  Auto Eosinophil # : 0.01 K/uL  Auto Basophil # : 0.02 K/uL  Auto Neutrophil % : 89.3 %  Auto Lymphocyte % : 4.7 %  Auto Monocyte % : 5.0 %  Auto Eosinophil % : 0.1 %  Auto Basophil % : 0.2 %    09-02    135  |  100  |  37<H>  ----------------------------<  89  3.7   |  23  |  2.07<H>    Ca    9.6      02 Sep 2020 06:17  Phos  2.4     09-01  Mg     2.0     09-02          VITALS  T(C): 37.2 (09-02-20 @ 05:40), Max: 37.5 (09-01-20 @ 12:59)  HR: 76 (09-02-20 @ 05:40) (76 - 81)  BP: 114/64 (09-02-20 @ 05:40) (110/60 - 120/61)  RR: 17 (09-02-20 @ 05:40) (17 - 18)  SpO2: 100% (09-02-20 @ 05:40) (100% - 100%)  Wt(kg): --    ALLERGIES  No Known Allergies      MEDICATIONS   acetaminophen   Tablet .. 650 milliGRAM(s) Oral every 6 hours  allopurinol 100 milliGRAM(s) Oral daily  aspirin enteric coated 81 milliGRAM(s) Oral daily  atorvastatin 40 milliGRAM(s) Oral at bedtime  folic acid 1 milliGRAM(s) Oral daily  lidocaine   Patch 1 Patch Transdermal daily  oxyCODONE    IR 5 milliGRAM(s) Oral every 4 hours PRN  pantoprazole    Tablet 40 milliGRAM(s) Oral before breakfast  rivaroxaban 15 milliGRAM(s) Oral with dinner  thiamine 100 milliGRAM(s) Oral daily      ----------------------------------------------------------------------------------------  PHYSICAL EXAM  Constitutional - NAD, Comfortable  HEENT - NCAT, EOMI  Neck - pain with ROM  Chest - no respiratory distress  Abdomen -  Soft, NTND  Extremities - No C/C/E, No calf tenderness   Neurologic Exam -                    Cognitive - Awake, Alert, AAO to self, place, date, year, situation     Communication - Fluent, No dysarthria     Cranial Nerves - CN 2-12 intact     Motor -                      LEFT    UE - ShAB 4+/5, EF 4/5, EE 4/5, WE 4/5,  4/5                    RIGHT UE - ShAB 5/5, EF 5/5, EE 5/5, WE 5/5,  5/5                    LEFT    LE - HF 4+/5, KE 4+/5, DF 4+/5, PF 4+/5                    RIGHT LE - HF 5/5, KE 5/5, DF 5/5, PF 5/5        Sensory - Intact to LT      OculoVestibular - No saccades, No nystagmus, VOR         Balance - WNL Static  Psychiatric - Mood stable, Affect WNL  ----------------------------------------------------------------------------------------  ASSESSMENT/PLAN  79 y.o male pmh of CAD s/p stents x 3 (2005), CKD, Afib on Xarelto, HTN,  s/p [employer] PPM, myositis, presents with weakness and neck pain.  CT head negative, MRI unable to be obtained due to PPM.   ct angio revealed acomm aneurysm, no acute intervention per neurosurgery (to follow up outpatient)  clinical cva: asa, statin  Pain -oxy IR,  tylenol, lidocaine patch, please add bowel regimen. last bm was 4 days ago per patient.  Anemia: pt reports history of blood in stool per his GI doctor, was planned for outpatient endoscopy.  Hg 7.7 today from 8.7 yesterday. Patient denies dizziness, lightheadedness or sob.  DVT PPX - xarelto (for nonvalvular afib)      Patient is currently requiring min assist for ambulation without device.    Recommend ACUTE inpatient rehabilitation for the functional deficits consisting of 3 hours of therapy/day & 24 hour RN/daily PMR physician for comorbid medical management. Will continue to follow for ongoing rehab needs and recommendations.

## 2020-09-02 NOTE — PROGRESS NOTE ADULT - SUBJECTIVE AND OBJECTIVE BOX
Patient unable to get MRi per primary team due to pacemaker, so CT obtained instead. Reviewed by Dr. Douglas, patient should follow up outpatient in 1 week     < from: CT Cervical Spine No Cont (09.01.20 @ 16:32) >    COMPARISON: No prior CT examinations of the cervical spine are available for comparison. Comparison is made to a prior CT angiogram examination of the neck from 8/30/2020.    FINDINGS: No acute fractures or dislocations are notable. There is very mild unchanged retrolisthesis of C3 on C4 and C4 on C5 and C5 on C6 There is no loss of vertebral body height. Scattered degenerative lucencies and areas of sclerosis are notable throughout the vertebral bodies and facets. Multilevel degenerative disc disease is noted, worst at the C4-C5, C5-C6, and at the C6-C7 levels with reactive endplate changes. Multilevel facet arthrosis is notable. The dens is intact. The lateral masses of C1 are not displaced. There is no prevertebral soft tissue swelling.    Evaluation of the intraspinal contents is limited on CT modality. There are variable degrees of multilevel canal and foraminal stenosis secondary to endplate degenerative changes and facet arthrosis. Severe canal stenosis is seen at C4-C5.    Arterial vascular calcifications are seen.  Biapical pleural thickening and/or scarring are seen along with small paraseptal cysts. A left-sided cardiac pacemaker is visualized on the  lateral view.    IMPRESSION: No acute fractures or dislocations.    Multilevel cervical spondylosis with severe canal stenosis at C4-C5.

## 2020-09-02 NOTE — PROGRESS NOTE ADULT - PROBLEM SELECTOR PLAN 3
-CT c-spine showing Multilevel cervical spondylosis with severe canal stenosis at C4-C5.  -neurosurgery f/u  -Pain control for neck pain with oxycodone prn.

## 2020-09-03 ENCOUNTER — TRANSCRIPTION ENCOUNTER (OUTPATIENT)
Age: 80
End: 2020-09-03

## 2020-09-03 ENCOUNTER — INPATIENT (INPATIENT)
Facility: HOSPITAL | Age: 80
LOS: 20 days | Discharge: HOME HEALTH | DRG: 949 | End: 2020-09-24
Attending: PSYCHIATRY & NEUROLOGY | Admitting: PSYCHIATRY & NEUROLOGY
Payer: MEDICARE

## 2020-09-03 VITALS
TEMPERATURE: 100 F | OXYGEN SATURATION: 100 % | HEART RATE: 89 BPM | RESPIRATION RATE: 12 BRPM | DIASTOLIC BLOOD PRESSURE: 85 MMHG | SYSTOLIC BLOOD PRESSURE: 123 MMHG

## 2020-09-03 VITALS
TEMPERATURE: 99 F | HEART RATE: 76 BPM | DIASTOLIC BLOOD PRESSURE: 78 MMHG | OXYGEN SATURATION: 100 % | RESPIRATION RATE: 14 BRPM | HEIGHT: 73 IN | WEIGHT: 123.68 LBS | SYSTOLIC BLOOD PRESSURE: 134 MMHG

## 2020-09-03 DIAGNOSIS — I63.9 CEREBRAL INFARCTION, UNSPECIFIED: ICD-10-CM

## 2020-09-03 PROBLEM — E78.5 HYPERLIPIDEMIA, UNSPECIFIED: Chronic | Status: ACTIVE | Noted: 2020-08-30

## 2020-09-03 PROBLEM — I10 ESSENTIAL (PRIMARY) HYPERTENSION: Chronic | Status: ACTIVE | Noted: 2020-08-30

## 2020-09-03 LAB
ANION GAP SERPL CALC-SCNC: 13 MMO/L — SIGNIFICANT CHANGE UP (ref 7–14)
BUN SERPL-MCNC: 42 MG/DL — HIGH (ref 7–23)
CALCIUM SERPL-MCNC: 10.3 MG/DL — SIGNIFICANT CHANGE UP (ref 8.4–10.5)
CHLORIDE SERPL-SCNC: 104 MMOL/L — SIGNIFICANT CHANGE UP (ref 98–107)
CO2 SERPL-SCNC: 24 MMOL/L — SIGNIFICANT CHANGE UP (ref 22–31)
CREAT SERPL-MCNC: 2.1 MG/DL — HIGH (ref 0.5–1.3)
GLUCOSE SERPL-MCNC: 95 MG/DL — SIGNIFICANT CHANGE UP (ref 70–99)
HCT VFR BLD CALC: 23.6 % — LOW (ref 39–50)
HGB BLD-MCNC: 8.5 G/DL — LOW (ref 13–17)
MAGNESIUM SERPL-MCNC: 2.3 MG/DL — SIGNIFICANT CHANGE UP (ref 1.6–2.6)
MCHC RBC-ENTMCNC: 36 % — SIGNIFICANT CHANGE UP (ref 32–36)
MCHC RBC-ENTMCNC: 36.8 PG — HIGH (ref 27–34)
MCV RBC AUTO: 102.2 FL — HIGH (ref 80–100)
NRBC # FLD: 0 K/UL — SIGNIFICANT CHANGE UP (ref 0–0)
PHOSPHATE SERPL-MCNC: 2.5 MG/DL — SIGNIFICANT CHANGE UP (ref 2.5–4.5)
PLATELET # BLD AUTO: 137 K/UL — LOW (ref 150–400)
PMV BLD: 10.5 FL — SIGNIFICANT CHANGE UP (ref 7–13)
POTASSIUM SERPL-MCNC: 4 MMOL/L — SIGNIFICANT CHANGE UP (ref 3.5–5.3)
POTASSIUM SERPL-SCNC: 4 MMOL/L — SIGNIFICANT CHANGE UP (ref 3.5–5.3)
RBC # BLD: 2.31 M/UL — LOW (ref 4.2–5.8)
RBC # FLD: 14.4 % — SIGNIFICANT CHANGE UP (ref 10.3–14.5)
SODIUM SERPL-SCNC: 141 MMOL/L — SIGNIFICANT CHANGE UP (ref 135–145)
WBC # BLD: 5.94 K/UL — SIGNIFICANT CHANGE UP (ref 3.8–10.5)
WBC # FLD AUTO: 5.94 K/UL — SIGNIFICANT CHANGE UP (ref 3.8–10.5)

## 2020-09-03 PROCEDURE — 99239 HOSP IP/OBS DSCHRG MGMT >30: CPT

## 2020-09-03 PROCEDURE — 72052 X-RAY EXAM NECK SPINE 6/>VWS: CPT | Mod: 26

## 2020-09-03 PROCEDURE — 99232 SBSQ HOSP IP/OBS MODERATE 35: CPT

## 2020-09-03 PROCEDURE — 99223 1ST HOSP IP/OBS HIGH 75: CPT

## 2020-09-03 RX ORDER — PANTOPRAZOLE SODIUM 20 MG/1
40 TABLET, DELAYED RELEASE ORAL
Refills: 0 | Status: DISCONTINUED | OUTPATIENT
Start: 2020-09-03 | End: 2020-09-24

## 2020-09-03 RX ORDER — THIAMINE MONONITRATE (VIT B1) 100 MG
100 TABLET ORAL DAILY
Refills: 0 | Status: DISCONTINUED | OUTPATIENT
Start: 2020-09-03 | End: 2020-09-24

## 2020-09-03 RX ORDER — OXYCODONE HYDROCHLORIDE 5 MG/1
5 TABLET ORAL EVERY 4 HOURS
Refills: 0 | Status: DISCONTINUED | OUTPATIENT
Start: 2020-09-03 | End: 2020-09-04

## 2020-09-03 RX ORDER — ATORVASTATIN CALCIUM 80 MG/1
40 TABLET, FILM COATED ORAL AT BEDTIME
Refills: 0 | Status: DISCONTINUED | OUTPATIENT
Start: 2020-09-03 | End: 2020-09-24

## 2020-09-03 RX ORDER — ACETAMINOPHEN 500 MG
2 TABLET ORAL
Qty: 0 | Refills: 0 | DISCHARGE
Start: 2020-09-03

## 2020-09-03 RX ORDER — SENNA PLUS 8.6 MG/1
2 TABLET ORAL
Qty: 0 | Refills: 0 | DISCHARGE
Start: 2020-09-03

## 2020-09-03 RX ORDER — SENNA PLUS 8.6 MG/1
2 TABLET ORAL AT BEDTIME
Refills: 0 | Status: DISCONTINUED | OUTPATIENT
Start: 2020-09-03 | End: 2020-09-03

## 2020-09-03 RX ORDER — ASPIRIN/CALCIUM CARB/MAGNESIUM 324 MG
81 TABLET ORAL DAILY
Refills: 0 | Status: DISCONTINUED | OUTPATIENT
Start: 2020-09-03 | End: 2020-09-24

## 2020-09-03 RX ORDER — ALLOPURINOL 300 MG
100 TABLET ORAL DAILY
Refills: 0 | Status: DISCONTINUED | OUTPATIENT
Start: 2020-09-03 | End: 2020-09-24

## 2020-09-03 RX ORDER — RIVAROXABAN 15 MG-20MG
15 KIT ORAL
Refills: 0 | Status: DISCONTINUED | OUTPATIENT
Start: 2020-09-03 | End: 2020-09-18

## 2020-09-03 RX ORDER — CHLORTHALIDONE 50 MG
1 TABLET ORAL
Qty: 0 | Refills: 0 | DISCHARGE

## 2020-09-03 RX ORDER — GABAPENTIN 400 MG/1
100 CAPSULE ORAL THREE TIMES A DAY
Refills: 0 | Status: DISCONTINUED | OUTPATIENT
Start: 2020-09-03 | End: 2020-09-08

## 2020-09-03 RX ORDER — FOLIC ACID 0.8 MG
1 TABLET ORAL DAILY
Refills: 0 | Status: DISCONTINUED | OUTPATIENT
Start: 2020-09-03 | End: 2020-09-24

## 2020-09-03 RX ORDER — OXYCODONE HYDROCHLORIDE 5 MG/1
1 TABLET ORAL
Qty: 0 | Refills: 0 | DISCHARGE
Start: 2020-09-03

## 2020-09-03 RX ORDER — LIDOCAINE 4 G/100G
1 CREAM TOPICAL
Qty: 0 | Refills: 0 | DISCHARGE
Start: 2020-09-03

## 2020-09-03 RX ORDER — FOLIC ACID 0.8 MG
1 TABLET ORAL
Qty: 0 | Refills: 0 | DISCHARGE
Start: 2020-09-03

## 2020-09-03 RX ORDER — METOPROLOL TARTRATE 50 MG
1 TABLET ORAL
Qty: 0 | Refills: 0 | DISCHARGE

## 2020-09-03 RX ORDER — INFLUENZA VIRUS VACCINE 15; 15; 15; 15 UG/.5ML; UG/.5ML; UG/.5ML; UG/.5ML
0.5 SUSPENSION INTRAMUSCULAR ONCE
Refills: 0 | Status: COMPLETED | OUTPATIENT
Start: 2020-09-03 | End: 2020-09-24

## 2020-09-03 RX ORDER — ATORVASTATIN CALCIUM 80 MG/1
1 TABLET, FILM COATED ORAL
Qty: 0 | Refills: 0 | DISCHARGE

## 2020-09-03 RX ORDER — GABAPENTIN 400 MG/1
1 CAPSULE ORAL
Qty: 0 | Refills: 0 | DISCHARGE
Start: 2020-09-03

## 2020-09-03 RX ORDER — POLYETHYLENE GLYCOL 3350 17 G/17G
17 POWDER, FOR SOLUTION ORAL
Qty: 0 | Refills: 0 | DISCHARGE
Start: 2020-09-03

## 2020-09-03 RX ORDER — ACETAMINOPHEN 500 MG
650 TABLET ORAL EVERY 6 HOURS
Refills: 0 | Status: DISCONTINUED | OUTPATIENT
Start: 2020-09-03 | End: 2020-09-24

## 2020-09-03 RX ORDER — METOPROLOL TARTRATE 50 MG
25 TABLET ORAL
Refills: 0 | Status: DISCONTINUED | OUTPATIENT
Start: 2020-09-03 | End: 2020-09-03

## 2020-09-03 RX ORDER — SENNA PLUS 8.6 MG/1
1 TABLET ORAL ONCE
Refills: 0 | Status: COMPLETED | OUTPATIENT
Start: 2020-09-03 | End: 2020-09-03

## 2020-09-03 RX ORDER — ATORVASTATIN CALCIUM 80 MG/1
1 TABLET, FILM COATED ORAL
Qty: 0 | Refills: 0 | DISCHARGE
Start: 2020-09-03

## 2020-09-03 RX ORDER — METOPROLOL TARTRATE 50 MG
1 TABLET ORAL
Qty: 0 | Refills: 0 | DISCHARGE
Start: 2020-09-03

## 2020-09-03 RX ORDER — LIDOCAINE 4 G/100G
1 CREAM TOPICAL DAILY
Refills: 0 | Status: DISCONTINUED | OUTPATIENT
Start: 2020-09-03 | End: 2020-09-08

## 2020-09-03 RX ORDER — METOPROLOL TARTRATE 50 MG
25 TABLET ORAL
Refills: 0 | Status: DISCONTINUED | OUTPATIENT
Start: 2020-09-03 | End: 2020-09-04

## 2020-09-03 RX ORDER — POLYETHYLENE GLYCOL 3350 17 G/17G
17 POWDER, FOR SOLUTION ORAL DAILY
Refills: 0 | Status: DISCONTINUED | OUTPATIENT
Start: 2020-09-03 | End: 2020-09-03

## 2020-09-03 RX ORDER — POLYETHYLENE GLYCOL 3350 17 G/17G
17 POWDER, FOR SOLUTION ORAL DAILY
Refills: 0 | Status: DISCONTINUED | OUTPATIENT
Start: 2020-09-03 | End: 2020-09-08

## 2020-09-03 RX ORDER — SENNA PLUS 8.6 MG/1
2 TABLET ORAL AT BEDTIME
Refills: 0 | Status: DISCONTINUED | OUTPATIENT
Start: 2020-09-03 | End: 2020-09-24

## 2020-09-03 RX ORDER — GABAPENTIN 400 MG/1
100 CAPSULE ORAL THREE TIMES A DAY
Refills: 0 | Status: DISCONTINUED | OUTPATIENT
Start: 2020-09-03 | End: 2020-09-03

## 2020-09-03 RX ADMIN — LIDOCAINE 1 PATCH: 4 CREAM TOPICAL at 12:10

## 2020-09-03 RX ADMIN — Medication 25 MILLIGRAM(S): at 16:32

## 2020-09-03 RX ADMIN — Medication 100 MILLIGRAM(S): at 12:09

## 2020-09-03 RX ADMIN — LIDOCAINE 1 PATCH: 4 CREAM TOPICAL at 00:43

## 2020-09-03 RX ADMIN — Medication 100 MILLIGRAM(S): at 12:10

## 2020-09-03 RX ADMIN — Medication 1 ENEMA: at 16:27

## 2020-09-03 RX ADMIN — SENNA PLUS 1 TABLET(S): 8.6 TABLET ORAL at 12:09

## 2020-09-03 RX ADMIN — OXYCODONE HYDROCHLORIDE 5 MILLIGRAM(S): 5 TABLET ORAL at 06:25

## 2020-09-03 RX ADMIN — POLYETHYLENE GLYCOL 3350 17 GRAM(S): 17 POWDER, FOR SOLUTION ORAL at 12:09

## 2020-09-03 RX ADMIN — Medication 1 MILLIGRAM(S): at 12:09

## 2020-09-03 RX ADMIN — OXYCODONE HYDROCHLORIDE 5 MILLIGRAM(S): 5 TABLET ORAL at 06:45

## 2020-09-03 RX ADMIN — LIDOCAINE 1 PATCH: 4 CREAM TOPICAL at 17:37

## 2020-09-03 RX ADMIN — Medication 81 MILLIGRAM(S): at 12:09

## 2020-09-03 RX ADMIN — GABAPENTIN 100 MILLIGRAM(S): 400 CAPSULE ORAL at 12:12

## 2020-09-03 RX ADMIN — ATORVASTATIN CALCIUM 40 MILLIGRAM(S): 80 TABLET, FILM COATED ORAL at 23:22

## 2020-09-03 RX ADMIN — OXYCODONE HYDROCHLORIDE 5 MILLIGRAM(S): 5 TABLET ORAL at 23:26

## 2020-09-03 RX ADMIN — GABAPENTIN 100 MILLIGRAM(S): 400 CAPSULE ORAL at 23:22

## 2020-09-03 RX ADMIN — SENNA PLUS 2 TABLET(S): 8.6 TABLET ORAL at 23:22

## 2020-09-03 RX ADMIN — RIVAROXABAN 15 MILLIGRAM(S): KIT at 16:27

## 2020-09-03 RX ADMIN — OXYCODONE HYDROCHLORIDE 5 MILLIGRAM(S): 5 TABLET ORAL at 22:35

## 2020-09-03 RX ADMIN — PANTOPRAZOLE SODIUM 40 MILLIGRAM(S): 20 TABLET, DELAYED RELEASE ORAL at 06:25

## 2020-09-03 RX ADMIN — Medication 650 MILLIGRAM(S): at 14:35

## 2020-09-03 RX ADMIN — Medication 650 MILLIGRAM(S): at 12:12

## 2020-09-03 NOTE — PROGRESS NOTE ADULT - PROBLEM SELECTOR PLAN 1
sequential CT head did not show new CVA. Unable to obtain MRI as ppm not compatible   -Aspirin/Statin  -PT/OT and speech and swallow noted- acute rehab  -TTE with bubble reviewed- normal LVEF, no PFO

## 2020-09-03 NOTE — DISCHARGE NOTE NURSING/CASE MANAGEMENT/SOCIAL WORK - NSDPFAC_GEN_ALL_CORE
[facility]: [address] [facility] [address] (Phone: [phone])

## 2020-09-03 NOTE — PROGRESS NOTE ADULT - SUBJECTIVE AND OBJECTIVE BOX
[facility] Division of Hospital Medicine  Boy Claire MD  Pager [phone]      Patient is a 79y old  Male who presents with a chief complaint of Stroke (03 Sep 2020 12:17)      SUBJECTIVE / OVERNIGHT EVENTS:    No acute event o/n. Pt reports his neck/shoulder pain is controlled. denies new neuro deficits     ADDITIONAL REVIEW OF SYSTEMS:    RESPIRATORY: No cough, wheezing, chills or hemoptysis; No shortness of breath  CARDIOVASCULAR: No chest pain, palpitations, dizziness, or leg swelling  GASTROINTESTINAL: No abdominal or epigastric pain. No nausea, vomiting, or hematemesis; No diarrhea or constipation. No melena or hematochezia.      MEDICATIONS  (STANDING):  acetaminophen   Tablet .. 650 milliGRAM(s) Oral every 6 hours  allopurinol 100 milliGRAM(s) Oral daily  aspirin enteric coated 81 milliGRAM(s) Oral daily  atorvastatin 40 milliGRAM(s) Oral at bedtime  folic acid 1 milliGRAM(s) Oral daily  gabapentin 100 milliGRAM(s) Oral three times a day  lidocaine   Patch 1 Patch Transdermal daily  pantoprazole    Tablet 40 milliGRAM(s) Oral before breakfast  rivaroxaban 15 milliGRAM(s) Oral with dinner  senna 2 Tablet(s) Oral at bedtime  thiamine 100 milliGRAM(s) Oral daily    MEDICATIONS  (PRN):  oxyCODONE    IR 5 milliGRAM(s) Oral every 4 hours PRN Severe Pain (7 - 10)  polyethylene glycol 3350 17 Gram(s) Oral daily PRN Constipation      CAPILLARY BLOOD GLUCOSE        I&O's Summary      PHYSICAL EXAM:  Vital Signs Last 24 Hrs  T(C): 37.1 (03 Sep 2020 12:07), Max: 37.1 (03 Sep 2020 12:07)  T(F): 98.8 (03 Sep 2020 12:07), Max: 98.8 (03 Sep 2020 12:07)  HR: 80 (03 Sep 2020 12:07) (70 - 80)  BP: 132/70 (03 Sep 2020 12:07) (107/67 - 132/70)  BP(mean): --  RR: 14 (03 Sep 2020 12:07) (14 - 16)  SpO2: 100% (03 Sep 2020 12:07) (100% - 100%)    CONSTITUTIONAL: NAD,   EYES: PERRLA; conjunctiva and sclera clear  ENMT: Moist oral mucosa, no pharyngeal injection or exudates;   NECK: Supple, no palpable masses;   RESPIRATORY: Normal respiratory effort; lungs are clear to auscultation bilaterally  CARDIOVASCULAR: Regular rate and rhythm, normal S1 and S2, no murmur/rub/gallop; No lower extremity edema; Peripheral pulses are 2+ bilaterally  ABDOMEN: Nontender to palpation, normoactive bowel sounds, no rebound/guarding;   MUSCLOSKELETAL:  no clubbing or cyanosis of digits; no joint swelling or tenderness to palpation  PSYCH: A+O to person, place, and time; affect appropriate  NEUROLOGY: CN 2-12 are intact and symmetric; no gross sensory deficits; unsteady gait   SKIN: No rashes; no palpable lesions    LABS:                        8.5    5.94  )-----------( 137      ( 03 Sep 2020 06:19 )             23.6     09-03    141  |  104  |  42<H>  ----------------------------<  95  4.0   |  24  |  2.10<H>    Ca    10.3      03 Sep 2020 06:19  Phos  2.5     09-03  Mg     2.3     09-03                  RADIOLOGY & ADDITIONAL TESTS:  Results Reviewed:   Imaging Personally Reviewed:  Electrocardiogram Personally Reviewed:    COORDINATION OF CARE:  Care Discussed with Consultants/Other Providers [Y/N]:  Prior or Outpatient Records Reviewed [Y/N]:

## 2020-09-03 NOTE — DISCHARGE NOTE NURSING/CASE MANAGEMENT/SOCIAL WORK - NSDCPEPTSTRK_GEN_ALL_CORE
Need for follow up after discharge/Stroke education booklet/Prescribed medications/Stroke support groups for patients, families, and friends/Risk factors for stroke/Stroke warning signs and symptoms/Signs and symptoms of stroke/Call 911 for stroke

## 2020-09-03 NOTE — PROGRESS NOTE ADULT - PROBLEM SELECTOR PLAN 3
-CT c-spine showing Multilevel cervical spondylosis with severe canal stenosis at C4-C5.  -neurosurgery f/u- c- spine XR reviewed, no inpt intervention required   -Pain control for neck pain with oxycodone prn. added gabapentin for neuropathy pain

## 2020-09-03 NOTE — DISCHARGE NOTE PROVIDER - NSDCMRMEDTOKEN_GEN_ALL_CORE_FT
acetaminophen 325 mg oral tablet: 2 tab(s) orally every 6 hours  allopurinol 100 mg oral tablet: 1 tab(s) orally once a day  Aspirin Enteric Coated 81 mg oral delayed release tablet: 1 tab(s) orally once a day  atorvastatin 40 mg oral tablet: 1 tab(s) orally once a day (at bedtime)  folic acid 1 mg oral tablet: 1 tab(s) orally once a day  gabapentin 100 mg oral capsule: 1 cap(s) orally 3 times a day  lidocaine 5% topical film: Apply topically to affected area once a day  metoprolol tartrate 25 mg oral tablet: 1 tab(s) orally 2 times a day  oxyCODONE 5 mg oral tablet: 1 tab(s) orally every 4 hours, As needed, Severe Pain (7 - 10)  pantoprazole 40 mg oral delayed release tablet: 1 tab(s) orally once a day  polyethylene glycol 3350 oral powder for reconstitution: 17 gram(s) orally once a day, As needed, Constipation  senna oral tablet: 2 tab(s) orally once a day (at bedtime)  Xarelto 15 mg oral tablet: 1 tab(s) orally once a day (in the evening)

## 2020-09-03 NOTE — H&P ADULT - NSHPREVIEWOFSYSTEMS_GEN_ALL_CORE
CONSTITUTIONAL: No fever, weight loss, or fatigue  EYES: No eye pain, visual disturbances, or discharge  ENMT:  No difficulty hearing, tinnitus, vertigo; No sinus or throat pain  NECK: No pain or stiffness  BREASTS: No pain, masses, or nipple discharge  RESPIRATORY: No cough, wheezing, chills or hemoptysis; No shortness of breath  CARDIOVASCULAR: No chest pain, palpitations, dizziness, or leg swelling  GASTROINTESTINAL: No abdominal or epigastric pain. No nausea, vomiting, or hematemesis; No diarrhea or constipation. No melena or hematochezia.  GENITOURINARY: No dysuria, frequency, hematuria, or incontinence  NEUROLOGICAL: No headaches, memory loss, loss of strength, numbness, or tremors  SKIN: No itching, burning, rashes, or lesions   LYMPH NODES: No enlarged glands  ENDOCRINE: No heat or cold intolerance; No hair loss  MUSCULOSKELETAL: No joint pain or swelling; No muscle, back, or extremity pain  PSYCHIATRIC: No depression, anxiety, mood swings, or difficulty sleeping  HEME/LYMPH: No easy bruising, or bleeding gums  ALLERY AND IMMUNOLOGIC: No hives or eczema CONSTITUTIONAL: No fever, weight loss, or fatigue  EYES: No eye pain, visual disturbances, or discharge  ENMT:  No difficulty hearing, tinnitus, vertigo; No sinus or throat pain  NECK: +neck pain  RESPIRATORY: No cough, wheezing, chills or hemoptysis; No shortness of breath  CARDIOVASCULAR: No chest pain, palpitations, dizziness, or leg swelling  GASTROINTESTINAL: No abdominal or epigastric pain. No nausea, vomiting, or hematemesis; No diarrhea or constipation. No melena or hematochezia. Last BM 9/3/20  GENITOURINARY: No dysuria, frequency, hematuria, or incontinence  NEUROLOGICAL: No headaches, memory loss, +loss of strength, numbness, or tremors  SKIN: No itching, burning, rashes, or lesions   MUSCULOSKELETAL: No joint pain or swelling; No muscle, or extremity pain. +LBP  PSYCHIATRIC: No depression, anxiety, mood swings, or difficulty sleeping  HEME/LYMPH: No easy bruising, or bleeding gums  ALLERY AND IMMUNOLOGIC: No hives or eczema

## 2020-09-03 NOTE — H&P ADULT - HISTORY OF PRESENT ILLNESS
78 yo male PMHx of HTN, CAD s/p stents x 3 (2005), CKD, Afib on Xarelto, s/p Medtronic PPM, myositis, presented to ED 8/30 with left weakness and neck pain. 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 Neurology and neurosurgery consulted. Per neuro evaluation, subcortical CVA suspected, ?right medullary/ pontine but this is uncertain/possibly due to small vessel disease versus cardioembolism related to atrial fibrillation. Unable to confirm with MRI- Pt with PPM that is not compatible with MRI. CTH repeated and stable. TTE EF 65%, no evidence of PFO. Pt with CT spine showing Multilevel cervical spondylosis with severe stenosis at C4-C5. Per Neurosurgery followup with Dr Douglas for aneurysm and C spine stenosis. No surgical intervention at this time. Evaluated by PMR and acute rehab recommended.

## 2020-09-03 NOTE — H&P ADULT - NSHPSOCIALHISTORY_GEN_ALL_CORE
CURRENT FUNCTIONAL STATUS  50' RW and cg Smoking - Denied  EtOH - 1 drink/night 5-6 times/week  Drugs - +smokes marijuana    Lives in a 2 story PH with wife. 2 JOSE, 12 steps up to 2nd floor, 12 steps down to basement.   PTA: Independent in ADLs and ambulation. Retired. +drives    CURRENT FUNCTIONAL STATUS  50' RW and cg

## 2020-09-03 NOTE — PROGRESS NOTE ADULT - PROBLEM SELECTOR PLAN 2
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 Douglas for Acomm aneurysm. Please call the office to schedule a follow up visit [phone] upon discharge.

## 2020-09-03 NOTE — H&P ADULT - ASSESSMENT
EVERARDO FIGUEROA is a 80yo Male with possible right sided CVA with left sided weakness and with decreased functional mobility, gait instability and ADL impairments.    COMORBIDITES/ACTIVE MEDICAL ISSUES     Gait Instability, ADL impairments and Functional impairments: start Comprehensive Rehab Program of PT/OT/SLP     #CVA  -start PT/OT/SLP  -Xarelto daily for Hx Afib  -ASA and Lipitor for CAD    #Hx Unruptured aneurysm  -outpt follow up Neurosurgery    #HTN  -Lopressor BID  -monitor BP closely    #Neck pain  -Neurontin TID      Pain Mgmt - Tylenol PRN  GI/Bowel Mgmt - Senna,  Miralax PRN  /Bladder Mgmt - Voiding independently, PVRx1      Precautions / PROPHYLAXIS:   - Falls, Cardiac, Seizure   - Ortho: Weight bearing status: WBAT   - Lungs: Aspiration, Incentive Spirometer   - Pressure injury/Skin: Turn Q2hrs while in bed, OOB to Chair, PT/OT    - DVT: Lovenox, SCDs, TEDs     MEDICAL PROGNOSIS: GOOD            REHAB POTENTIAL: GOOD             ESTIMATED DISPOSITION: HOME WITH HOME CARE            ELOS: 10-14 Days   EXPECTED THERAPY:     P.T. 1hr/day       O.T. 1hr/day      S.L.P. 1hr/day     P&O Unnecessary     EXP FREQUENCY: 5 days per 7 day period     PRESCREEN COMPARISION:   I have reviewed the prescreen information and I have found no relevant changes between the preadmission screening and my post admission evaluation     RATIONALE FOR INPATIENT ADMISSION - Patient demonstrates the following: (check all that apply)  [X] Medically appropriate for rehabilitation admission  [X] Has attainable rehab goals with an appropriate initial discharge plan  [X] Has rehabilitation potential (expected to make a significant improvement within a reasonable period of time)   [X] Requires close medical management by a rehab physician, rehab nursing care, Hospitalist and comprehensive interdisciplinary team (including PT, OT, & or SLP, Prosthetics and Orthotics) EVERARDO FIGUEROA is a 78yo Male with possible right sided CVA with left sided weakness and with decreased functional mobility, gait instability and ADL impairments.    COMORBIDITES/ACTIVE MEDICAL ISSUES     Gait Instability, ADL impairments and Functional impairments: start Comprehensive Rehab Program of PT/OT/SLP     #CVA  -start PT/OT/SLP  -Xarelto daily for Hx Afib  -ASA and Lipitor for CAD    #Hx Unruptured aneurysm  -outpt follow up Neurosurgery    #HTN  -Lopressor BID  -monitor BP closely    #Neck pain  -Neurontin TID  - Tylenol PRN, oxycodone PRN    GI/Bowel Mgmt - Senna,  Miralax PRN  /Bladder Mgmt - Voiding independently, PVRx1      Precautions / PROPHYLAXIS:   - Falls, Cardiac, Seizure   - Ortho: Weight bearing status: WBAT   - Lungs: Aspiration, Incentive Spirometer   - Pressure injury/Skin: Turn Q2hrs while in bed, OOB to Chair, PT/OT    - DVT: Xarelto    MEDICAL PROGNOSIS: GOOD            REHAB POTENTIAL: GOOD             ESTIMATED DISPOSITION: HOME WITH HOME CARE            ELOS: 10-14 Days   EXPECTED THERAPY:     P.T. 1hr/day       O.T. 1hr/day      S.L.P. 1hr/day     P&O Unnecessary     EXP FREQUENCY: 5 days per 7 day period     PRESCREEN COMPARISION:   I have reviewed the prescreen information and I have found no relevant changes between the preadmission screening and my post admission evaluation     RATIONALE FOR INPATIENT ADMISSION - Patient demonstrates the following: (check all that apply)  [X] Medically appropriate for rehabilitation admission  [X] Has attainable rehab goals with an appropriate initial discharge plan  [X] Has rehabilitation potential (expected to make a significant improvement within a reasonable period of time)   [X] Requires close medical management by a rehab physician, rehab nursing care, Hospitalist and comprehensive interdisciplinary team (including PT, OT, & or SLP, Prosthetics and Orthotics) EVERARDO FIGUEROA is a 78yo Male with possible right sided subcortical CVA with left sided weakness and with decreased functional mobility, gait instability and ADL impairments.    - COMORBIDITES/ACTIVE MEDICAL ISSUES     Gait Instability, ADL impairments and Functional impairments: start Comprehensive Rehab Program of PT/OT/SLP     #CVA  -start PT/OT/SLP  -Xarelto daily for Hx Afib  -ASA  for CAD   - Lipitor for goal LDL < 70  - GI ppx  Protonix       #Hx Unruptured aneurysm  -outpt follow up Neurosurgery    #HTN  -Lopressor BID  -monitor BP closely    #Neck pain/ Cervical spine stenosis   - Neurontin TID  - Tylenol PRN, oxycodone PRN  - Neurosurgical follow as outpatient     GI/Bowel Mgmt - Senna,  Miralax PRN  /Bladder Mgmt - Voiding independently, PVRx1      Precautions / PROPHYLAXIS:   - Falls, Cardiac, Seizure   - Ortho: Weight bearing status: WBAT   - Lungs: Aspiration, Incentive Spirometer   - Pressure injury/Skin: Turn Q2hrs while in bed, OOB to Chair, PT/OT    - DVT: Xarelto    MEDICAL PROGNOSIS: GOOD            REHAB POTENTIAL: GOOD             ESTIMATED DISPOSITION: HOME WITH HOME CARE            ELOS: 10-14 Days   EXPECTED THERAPY:     P.T. 1hr/day       O.T. 1hr/day      S.L.P. 1hr/day     P&O Unnecessary     EXP FREQUENCY: 5 days per 7 day period     PRESCREEN COMPARISION:   I have reviewed the prescreen information and I have found no relevant changes between the preadmission screening and my post admission evaluation     RATIONALE FOR INPATIENT ADMISSION - Patient demonstrates the following: (check all that apply)  [X] Medically appropriate for rehabilitation admission  [X] Has attainable rehab goals with an appropriate initial discharge plan  [X] Has rehabilitation potential (expected to make a significant improvement within a reasonable period of time)   [X] Requires close medical management by a rehab physician, rehab nursing care, Hospitalist and comprehensive interdisciplinary team (including PT, OT, & or SLP, Prosthetics and Orthotics)

## 2020-09-03 NOTE — DISCHARGE NOTE PROVIDER - HOSPITAL COURSE
79 y.o male pmh of CAD s/p stents x 3 (2005), CKD, Afib on Xarelto, HTN,  s/p Medtronic PPM, myositis, presents with weakness and neck pain admitted to rule out stroke.         Hospital course:        Pt admitted for r/o CVA. 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 Neurology and neurosurgery consulted. Per neuro pt with right brain dysfunction, perhaps small-deep/subcortical or pontine but this is uncertain.  Etiology is probably acute ischemic stroke of uncertain mechanism, but possibly due to small vessel disease versus cardioembolism related to atrial fibrillation. Pt with PPM that is not compatible with MRI. Neurology recommended repeat CTH. CTH stable. TTE EF 65, bubble study with no evidence of PFO. Pt with CT spine noted showing Multilevel cervical spondylosis with severe canal stenosis at C4-C5. Xray C spine flexion and extension with no dynamic instability. Per Neurosurgery followup with Dr Douglas for followup monitor of aneurysm and C spine stenosis. No surgical intervention needed at this time. PT recommended rehab        Case discussed with Dr Claire pt cleared for discharge on 9/3. Reviewed discharge medications with patient; All new medications requiring new prescription sent to pharmacy of patients choice. Reviewed need for prescription for previous home medication and new prescriptions sent if requested. Patient in agreement and understands.

## 2020-09-03 NOTE — PROGRESS NOTE ADULT - ATTENDING COMMENTS
Will follow up in the office to discuss the aneurysm and cerebral angiogram.
discharge planning if no c-spine fx on CT
discharge planning if cleared by neurosurgery
stable for discharge to acute rehab today  total time spent on dc 41min

## 2020-09-03 NOTE — H&P ADULT - ATTENDING COMMENTS
Patient examined, medical records reviewed  Admit to [facility]  Resume all medications  Admit labs   Medicine evaluation  Fall precautions

## 2020-09-03 NOTE — PATIENT PROFILE ADULT - VISION (WITH CORRECTIVE LENSES IF THE PATIENT USUALLY WEARS THEM):
Normal vision: sees adequately in most situations; can see medication labels, newsprint/wears glasses for distance and reading

## 2020-09-03 NOTE — H&P ADULT - NSHPLABSRESULTS_GEN_ALL_CORE
EXAM:  CT BRAIN        PROCEDURE DATE:  Aug 31 2020         INTERPRETATION:  INDICATIONS:  f/u CVA  . Left upper and lower extremity weakness.    TECHNIQUE:  Serial axial images were obtained from the skull base to the vertex without intravenous contrast. Coronal and sagittal reformatted images were obtained.    COMPARISON EXAMINATION: 8/30/2020    FINDINGS:  VENTRICLES AND SULCI:  Normal.    INTRA-AXIAL:  Areas of white matter hypodensity are seen within the cerebral hemispheres bilaterally compatible with mild microvascular-type changes. Calcium deposition is seen within the basal ganglia and dentate nuclei No mass, blood or abnormal attenuation is otherwise seen.    EXTRA-AXIAL:  No mass or collection is seen.    VISUALIZED SINUSES:  Right sphenoid foamy secretions. Mild ethmoid mucosal thickening    VISUALIZED MASTOIDS:  Clear.    CALVARIUM:  Normal.    MISCELLANEOUS:  None.      IMPRESSION:  Stable exam.    No mass effect, hemorrhage or evidence of acute intracranial pathology.                SILVANA OLEARY M.D., ATTENDING RADIOLOGIST  This document has been electronically signed. Sep  1 2020  8:03AM RECENT LABS/IMAGING                        8.5    5.94  )-----------( 137      ( 03 Sep 2020 06:19 )             23.6     09-03    141  |  104  |  42<H>  ----------------------------<  95  4.0   |  24  |  2.10<H>    Ca    10.3      03 Sep 2020 06:19  Phos  2.5     09-03  Mg     2.3     09-03            EXAM:  CT BRAIN        PROCEDURE DATE:  Aug 31 2020         INTERPRETATION:  INDICATIONS:  f/u CVA  . Left upper and lower extremity weakness.    TECHNIQUE:  Serial axial images were obtained from the skull base to the vertex without intravenous contrast. Coronal and sagittal reformatted images were obtained.    COMPARISON EXAMINATION: 8/30/2020    FINDINGS:  VENTRICLES AND SULCI:  Normal.    INTRA-AXIAL:  Areas of white matter hypodensity are seen within the cerebral hemispheres bilaterally compatible with mild microvascular-type changes. Calcium deposition is seen within the basal ganglia and dentate nuclei No mass, blood or abnormal attenuation is otherwise seen.    EXTRA-AXIAL:  No mass or collection is seen.    VISUALIZED SINUSES:  Right sphenoid foamy secretions. Mild ethmoid mucosal thickening    VISUALIZED MASTOIDS:  Clear.    CALVARIUM:  Normal.    MISCELLANEOUS:  None.      IMPRESSION:  Stable exam.    No mass effect, hemorrhage or evidence of acute intracranial pathology.                SILVANA OLEARY M.D., ATTENDING RADIOLOGIST  This document has been electronically signed. Sep  1 2020  8:03AM

## 2020-09-03 NOTE — PROGRESS NOTE ADULT - SUBJECTIVE AND OBJECTIVE BOX
Patient is a 79y old  Male who presents with a chief complaint of Stroke (02 Sep 2020 13:11)    Interval history: patient had x ray c spine this morning. results pending  Pt reports constipation. 5/10 pain    REVIEW OF SYSTEMS  Constitutional - No fever, No weight loss, No fatigue  HEENT - No eye pain, No visual disturbances, No difficulty hearing, No tinnitus, No vertigo, No neck pain  Respiratory - + cough, No wheezing, No shortness of breath  Cardiovascular - No chest pain, No palpitations  Gastrointestinal - No abdominal pain, No nausea, No vomiting, No diarrhea, No constipation  Genitourinary - No dysuria, No frequency, No hematuria, No incontinence  Neurological - No headaches, No memory loss, + loss of strength, No numbness, No tremors  Skin - No itching, No rashes, No lesions   Endocrine - No temperature intolerance  Musculoskeletal - + neck pain  Psychiatric - No depression, No anxiety    PAST MEDICAL & SURGICAL HISTORY  Myositis  Hyperlipidemia  Hypertension  GI bleed  Diverticulitis  No significant past surgical history      CURRENT FUNCTIONAL STATUS  50' RW and cg    FAMILY HISTORY   Family history of cerebrovascular accident (CVA) (Sibling)  Family history of heart disease      RECENT LABS/IMAGING  CBC Full  -  ( 03 Sep 2020 06:19 )  WBC Count : 5.94 K/uL  RBC Count : 2.31 M/uL  Hemoglobin : 8.5 g/dL  Hematocrit : 23.6 %  Platelet Count - Automated : 137 K/uL  Mean Cell Volume : 102.2 fL  Mean Cell Hemoglobin : 36.8 pg  Mean Cell Hemoglobin Concentration : 36.0 %  Auto Neutrophil # : x  Auto Lymphocyte # : x  Auto Monocyte # : x  Auto Eosinophil # : x  Auto Basophil # : x  Auto Neutrophil % : x  Auto Lymphocyte % : x  Auto Monocyte % : x  Auto Eosinophil % : x  Auto Basophil % : x    09-03    141  |  104  |  42<H>  ----------------------------<  95  4.0   |  24  |  2.10<H>    Ca    10.3      03 Sep 2020 06:19  Phos  2.5     09-03  Mg     2.3     09-03          VITALS  T(C): 37.1 (09-03-20 @ 12:07), Max: 37.1 (09-03-20 @ 12:07)  HR: 80 (09-03-20 @ 12:07) (70 - 80)  BP: 132/70 (09-03-20 @ 12:07) (107/67 - 132/70)  RR: 14 (09-03-20 @ 12:07) (14 - 16)  SpO2: 100% (09-03-20 @ 12:07) (100% - 100%)  Wt(kg): --    ALLERGIES  No Known Allergies      MEDICATIONS   acetaminophen   Tablet .. 650 milliGRAM(s) Oral every 6 hours  allopurinol 100 milliGRAM(s) Oral daily  aspirin enteric coated 81 milliGRAM(s) Oral daily  atorvastatin 40 milliGRAM(s) Oral at bedtime  folic acid 1 milliGRAM(s) Oral daily  gabapentin 100 milliGRAM(s) Oral three times a day  lidocaine   Patch 1 Patch Transdermal daily  oxyCODONE    IR 5 milliGRAM(s) Oral every 4 hours PRN  pantoprazole    Tablet 40 milliGRAM(s) Oral before breakfast  polyethylene glycol 3350 17 Gram(s) Oral daily PRN  rivaroxaban 15 milliGRAM(s) Oral with dinner  thiamine 100 milliGRAM(s) Oral daily      ----------------------------------------------------------------------------------------  HYSICAL EXAM  Constitutional - NAD, Comfortable  HEENT - NCAT, EOMI  Neck - pain with ROM  Chest - no respiratory distress  Abdomen -  Soft, NTND  Extremities - No C/C/E, No calf tenderness   Neurologic Exam -                    Cognitive - Awake, Alert, AAO to self, place, date, year, situation     Communication - Fluent, No dysarthria     Cranial Nerves - CN 2-12 intact     Motor -                      LEFT    UE - ShAB 4+/5, EF 4/5, EE 4/5, WE 4/5,  4/5                    RIGHT UE - ShAB 5/5, EF 5/5, EE 5/5, WE 5/5,  5/5                    LEFT    LE - HF 4+/5, KE 4+/5, DF 4+/5, PF 4+/5                    RIGHT LE - HF 5/5, KE 5/5, DF 5/5, PF 5/5        Sensory - Intact to LT      OculoVestibular - No saccades, No nystagmus, VOR         Balance - WNL Static  Psychiatric - Mood stable, Affect WNL  ----------------------------------------------------------------------------------------  ASSESSMENT/PLAN  79 y.o male pmh of CAD s/p stents x 3 (2005), CKD, Afib on Xarelto, HTN,  s/p Medtronic PPM, myositis, presents with weakness and neck pain.  CT head negative, MRI unable to be obtained due to PPM.   ct angio revealed acomm aneurysm.    flexion and extension c spine imaging done today, CT showed severe stenosis. f/u neurosurgery  clinical cva: asa, statin  Pain -oxy IR,  tylenol, lidocaine patch, started on gabapentin.  Please add bowel regimen, last bm was  5days ago per patient.  Anemia: repeat hg improved,  Patient denies dizziness, lightheadedness or sob.  DVT PPX - xarelto (for nonvalvular afib)      Recommend ACUTE inpatient rehabilitation for the functional deficits consisting of 3 hours of therapy/day & 24 hour RN/daily PMR physician for comorbid medical management. Will continue to follow for ongoing rehab needs and recommendations.

## 2020-09-03 NOTE — DISCHARGE NOTE PROVIDER - NSDCCPCAREPLAN_GEN_ALL_CORE_FT
PRINCIPAL DISCHARGE DIAGNOSIS  Diagnosis: Suspected cerebrovascular accident (CVA)  Assessment and Plan of Treatment: CT head with no acute process, you pacemaker is not comptible with MRI. followup with neurology NP Haylie Mcelroy in 2 weeks. continue PT/OT at rehab      SECONDARY DISCHARGE DIAGNOSES  Diagnosis: Cervical stenosis of spinal canal  Assessment and Plan of Treatment: followup with neurosurgery Dr Misael Mckeon in 1 week after discharge from rehab, call [phone] for appt    Diagnosis: Atrial fibrillation  Assessment and Plan of Treatment: Please take your medications as prescribed.  Continue to take your blood thinner as prescribed and follow with your physician.  Low fat diet, reduce caffeine intake, and exercise at least 30 minutes daily.    Diagnosis: Aneurysm  Assessment and Plan of Treatment: followup with neurosurgery for continue monitor    Diagnosis: Essential hypertension  Assessment and Plan of Treatment: Low sodium and fat diet, continue anti-hypertensive medications, and follow up with primary care physician.    Diagnosis: Hyperlipidemia, unspecified hyperlipidemia type  Assessment and Plan of Treatment: Low fat diet, exercise daily and continue current medications. Follow up with primary care physician and cardiologist for management.    Diagnosis: Chronic kidney disease, unspecified CKD stage  Assessment and Plan of Treatment: Continue blood pressure, cholesterol and diabetic medications. Goal of hemoglobin A1C (HgbA1C) < 7%.  Avoid nephrotoxic drugs such as nonsteroidal anti-inflammatory agents (NSAIDs).   Please follow up with your nephrologist to monitor your kidney function, continue with low protein and potassium diet. PRINCIPAL DISCHARGE DIAGNOSIS  Diagnosis: Suspected cerebrovascular accident (CVA)  Assessment and Plan of Treatment: CT head with no acute process, you pacemaker is not comptible with MRI. followup with neurology NP Haylie Mcelroy in 2 weeks. continue PT/OT at rehab      SECONDARY DISCHARGE DIAGNOSES  Diagnosis: Constipation  Assessment and Plan of Treatment: continue bowel regimen. your last BM is 9/3.    Diagnosis: Cervical stenosis of spinal canal  Assessment and Plan of Treatment: followup with neurosurgery Dr Misael Mckeon in 1 week after discharge from rehab, call [phone] for appt    Diagnosis: Atrial fibrillation  Assessment and Plan of Treatment: Please take your medications as prescribed.  Continue to take your blood thinner as prescribed and follow with your physician.  Low fat diet, reduce caffeine intake, and exercise at least 30 minutes daily.    Diagnosis: Aneurysm  Assessment and Plan of Treatment: followup with neurosurgery for continue monitor    Diagnosis: Essential hypertension  Assessment and Plan of Treatment: Low sodium and fat diet, continue anti-hypertensive medications, and follow up with primary care physician.    Diagnosis: Hyperlipidemia, unspecified hyperlipidemia type  Assessment and Plan of Treatment: Low fat diet, exercise daily and continue current medications. Follow up with primary care physician and cardiologist for management.    Diagnosis: Chronic kidney disease, unspecified CKD stage  Assessment and Plan of Treatment: Continue blood pressure, cholesterol and diabetic medications. Goal of hemoglobin A1C (HgbA1C) < 7%.  Avoid nephrotoxic drugs such as nonsteroidal anti-inflammatory agents (NSAIDs).   Please follow up with your nephrologist to monitor your kidney function, continue with low protein and potassium diet.

## 2020-09-03 NOTE — DISCHARGE NOTE PROVIDER - CARE PROVIDER_API CALL
Samia Mcelroy (NP; RN)  NP in Family Health  [address], [address]  [place] [address]  Phone: [phone]  Fax: [phone]  Follow Up Time:     PMD,   Phone: (   )    -  Fax: (   )    -  Follow Up Time:

## 2020-09-03 NOTE — DISCHARGE NOTE NURSING/CASE MANAGEMENT/SOCIAL WORK - PATIENT PORTAL LINK FT
You can access the FollowMyHealth Patient Portal offered by [facility] by registering at the following website: [email or web address]. By joining [facility]’s FollowMyHealth portal, you will also be able to view your health information using other applications (apps) compatible with our system.

## 2020-09-03 NOTE — H&P ADULT - NSHPPHYSICALEXAM_GEN_ALL_CORE
Gen - NAD, Comfortable  HEENT - NCAT, EOMI, MMM  Neck - Supple, +limited ROM 2/2 pain  Pulm - CTAB, No wheeze, No rhonchi, No crackles  Cardiovascular - RRR, S1S2, No murmurs, +L chest PPM  Abdomen - Soft, NT/ND, +BS  Extremities - No C/C/E, No calf tenderness  Neuro-     Cognitive - AAOx3     Communication - Fluent, No dysarthria     Attention: Intact      Judgement: Good evidence of judgement     Memory: Recall 3/3 objects immediate and 3/3 5 min later         Cranial Nerves - CN 2-12 intact     Motor -                     LEFT    UE - ShAB 4+/5, EF 5/5, EE 5/5, WE 5/5,  5/5                    RIGHT UE - ShAB 5/5, EF 5/5, EE 5/5, WE 5/5,  5/5                    LEFT    LE - HF 4+/5, KE 5/5, DF 5/5, PF 5/5                    RIGHT LE - HF 5/5, KE 5/5, DF 5/5, PF 5/5        Sensory - Intact to LT     Reflexes - DTR Intact, No primitive reflexive     Coordination - +Left FTN dysmetria     Tone - normal  Psychiatric - Mood stable, Affect WNL  Skin:  all skin intact    Wounds: None Present

## 2020-09-04 LAB
ALBUMIN SERPL ELPH-MCNC: 3 G/DL — LOW (ref 3.3–5)
ALP SERPL-CCNC: 65 U/L — SIGNIFICANT CHANGE UP (ref 40–120)
ALT FLD-CCNC: 23 U/L — SIGNIFICANT CHANGE UP (ref 10–45)
ANION GAP SERPL CALC-SCNC: 10 MMOL/L — SIGNIFICANT CHANGE UP (ref 5–17)
APPEARANCE UR: CLEAR — SIGNIFICANT CHANGE UP
AST SERPL-CCNC: 24 U/L — SIGNIFICANT CHANGE UP (ref 10–40)
BASOPHILS # BLD AUTO: 0.02 K/UL — SIGNIFICANT CHANGE UP (ref 0–0.2)
BASOPHILS NFR BLD AUTO: 0.4 % — SIGNIFICANT CHANGE UP (ref 0–2)
BILIRUB SERPL-MCNC: 0.4 MG/DL — SIGNIFICANT CHANGE UP (ref 0.2–1.2)
BILIRUB UR-MCNC: NEGATIVE — SIGNIFICANT CHANGE UP
BUN SERPL-MCNC: 46 MG/DL — HIGH (ref 7–23)
CALCIUM SERPL-MCNC: 9.7 MG/DL — SIGNIFICANT CHANGE UP (ref 8.4–10.5)
CHLORIDE SERPL-SCNC: 102 MMOL/L — SIGNIFICANT CHANGE UP (ref 96–108)
CO2 SERPL-SCNC: 25 MMOL/L — SIGNIFICANT CHANGE UP (ref 22–31)
COLOR SPEC: YELLOW — SIGNIFICANT CHANGE UP
CREAT SERPL-MCNC: 2.18 MG/DL — HIGH (ref 0.5–1.3)
DIFF PNL FLD: ABNORMAL
EOSINOPHIL # BLD AUTO: 0.05 K/UL — SIGNIFICANT CHANGE UP (ref 0–0.5)
EOSINOPHIL NFR BLD AUTO: 1 % — SIGNIFICANT CHANGE UP (ref 0–6)
GLUCOSE SERPL-MCNC: 102 MG/DL — HIGH (ref 70–99)
GLUCOSE UR QL: NEGATIVE — SIGNIFICANT CHANGE UP
HCT VFR BLD CALC: 21.2 % — LOW (ref 39–50)
HCT VFR BLD CALC: 21.4 % — LOW (ref 39–50)
HGB BLD-MCNC: 7.5 G/DL — LOW (ref 13–17)
HGB BLD-MCNC: 7.7 G/DL — LOW (ref 13–17)
IMM GRANULOCYTES NFR BLD AUTO: 0.4 % — SIGNIFICANT CHANGE UP (ref 0–1.5)
KETONES UR-MCNC: NEGATIVE — SIGNIFICANT CHANGE UP
LEUKOCYTE ESTERASE UR-ACNC: NEGATIVE — SIGNIFICANT CHANGE UP
LYMPHOCYTES # BLD AUTO: 0.64 K/UL — LOW (ref 1–3.3)
LYMPHOCYTES # BLD AUTO: 13.1 % — SIGNIFICANT CHANGE UP (ref 13–44)
MCHC RBC-ENTMCNC: 35.4 GM/DL — SIGNIFICANT CHANGE UP (ref 32–36)
MCHC RBC-ENTMCNC: 36 GM/DL — SIGNIFICANT CHANGE UP (ref 32–36)
MCHC RBC-ENTMCNC: 36.1 PG — HIGH (ref 27–34)
MCHC RBC-ENTMCNC: 36.2 PG — HIGH (ref 27–34)
MCV RBC AUTO: 100.5 FL — HIGH (ref 80–100)
MCV RBC AUTO: 101.9 FL — HIGH (ref 80–100)
MONOCYTES # BLD AUTO: 0.64 K/UL — SIGNIFICANT CHANGE UP (ref 0–0.9)
MONOCYTES NFR BLD AUTO: 13.1 % — SIGNIFICANT CHANGE UP (ref 2–14)
NEUTROPHILS # BLD AUTO: 3.53 K/UL — SIGNIFICANT CHANGE UP (ref 1.8–7.4)
NEUTROPHILS NFR BLD AUTO: 72 % — SIGNIFICANT CHANGE UP (ref 43–77)
NITRITE UR-MCNC: POSITIVE
NRBC # BLD: 0 /100 WBCS — SIGNIFICANT CHANGE UP (ref 0–0)
NRBC # BLD: 0 /100 WBCS — SIGNIFICANT CHANGE UP (ref 0–0)
PH UR: 6.5 — SIGNIFICANT CHANGE UP (ref 5–8)
PHOSPHATE SERPL-MCNC: 2.9 MG/DL — SIGNIFICANT CHANGE UP (ref 2.5–4.5)
PLATELET # BLD AUTO: 128 K/UL — LOW (ref 150–400)
PLATELET # BLD AUTO: 136 K/UL — LOW (ref 150–400)
POTASSIUM SERPL-MCNC: 4.1 MMOL/L — SIGNIFICANT CHANGE UP (ref 3.5–5.3)
POTASSIUM SERPL-SCNC: 4.1 MMOL/L — SIGNIFICANT CHANGE UP (ref 3.5–5.3)
PROT SERPL-MCNC: 6.6 G/DL — SIGNIFICANT CHANGE UP (ref 6–8.3)
PROT UR-MCNC: NEGATIVE — SIGNIFICANT CHANGE UP
RBC # BLD: 2.08 M/UL — LOW (ref 4.2–5.8)
RBC # BLD: 2.13 M/UL — LOW (ref 4.2–5.8)
RBC # FLD: 13.6 % — SIGNIFICANT CHANGE UP (ref 10.3–14.5)
RBC # FLD: 14.3 % — SIGNIFICANT CHANGE UP (ref 10.3–14.5)
SODIUM SERPL-SCNC: 137 MMOL/L — SIGNIFICANT CHANGE UP (ref 135–145)
SP GR SPEC: 1.01 — SIGNIFICANT CHANGE UP (ref 1.01–1.02)
TROPONIN I SERPL-MCNC: <.017 NG/ML — LOW (ref 0.02–0.06)
UROBILINOGEN FLD QL: NEGATIVE — SIGNIFICANT CHANGE UP
WBC # BLD: 4.89 K/UL — SIGNIFICANT CHANGE UP (ref 3.8–10.5)
WBC # BLD: 4.9 K/UL — SIGNIFICANT CHANGE UP (ref 3.8–10.5)
WBC # FLD AUTO: 4.89 K/UL — SIGNIFICANT CHANGE UP (ref 3.8–10.5)
WBC # FLD AUTO: 4.9 K/UL — SIGNIFICANT CHANGE UP (ref 3.8–10.5)

## 2020-09-04 PROCEDURE — 99222 1ST HOSP IP/OBS MODERATE 55: CPT

## 2020-09-04 PROCEDURE — 71045 X-RAY EXAM CHEST 1 VIEW: CPT | Mod: 26

## 2020-09-04 PROCEDURE — 99233 SBSQ HOSP IP/OBS HIGH 50: CPT

## 2020-09-04 PROCEDURE — 93010 ELECTROCARDIOGRAM REPORT: CPT

## 2020-09-04 PROCEDURE — 99223 1ST HOSP IP/OBS HIGH 75: CPT

## 2020-09-04 RX ORDER — DIPHENHYDRAMINE HCL 50 MG
25 CAPSULE ORAL ONCE
Refills: 0 | Status: COMPLETED | OUTPATIENT
Start: 2020-09-04 | End: 2020-09-05

## 2020-09-04 RX ORDER — SODIUM CHLORIDE 9 MG/ML
1000 INJECTION INTRAMUSCULAR; INTRAVENOUS; SUBCUTANEOUS ONCE
Refills: 0 | Status: COMPLETED | OUTPATIENT
Start: 2020-09-04 | End: 2020-09-04

## 2020-09-04 RX ORDER — METOPROLOL TARTRATE 50 MG
12.5 TABLET ORAL
Refills: 0 | Status: DISCONTINUED | OUTPATIENT
Start: 2020-09-04 | End: 2020-09-07

## 2020-09-04 RX ORDER — FERROUS SULFATE 325(65) MG
325 TABLET ORAL DAILY
Refills: 0 | Status: DISCONTINUED | OUTPATIENT
Start: 2020-09-04 | End: 2020-09-24

## 2020-09-04 RX ADMIN — GABAPENTIN 100 MILLIGRAM(S): 400 CAPSULE ORAL at 06:01

## 2020-09-04 RX ADMIN — Medication 81 MILLIGRAM(S): at 11:20

## 2020-09-04 RX ADMIN — SENNA PLUS 2 TABLET(S): 8.6 TABLET ORAL at 21:42

## 2020-09-04 RX ADMIN — Medication 12.5 MILLIGRAM(S): at 18:53

## 2020-09-04 RX ADMIN — Medication 650 MILLIGRAM(S): at 11:24

## 2020-09-04 RX ADMIN — Medication 650 MILLIGRAM(S): at 06:03

## 2020-09-04 RX ADMIN — Medication 100 MILLIGRAM(S): at 11:20

## 2020-09-04 RX ADMIN — LIDOCAINE 1 PATCH: 4 CREAM TOPICAL at 23:50

## 2020-09-04 RX ADMIN — Medication 650 MILLIGRAM(S): at 18:39

## 2020-09-04 RX ADMIN — SODIUM CHLORIDE 1000 MILLILITER(S): 9 INJECTION INTRAMUSCULAR; INTRAVENOUS; SUBCUTANEOUS at 11:22

## 2020-09-04 RX ADMIN — PANTOPRAZOLE SODIUM 40 MILLIGRAM(S): 20 TABLET, DELAYED RELEASE ORAL at 06:01

## 2020-09-04 RX ADMIN — Medication 1 MILLIGRAM(S): at 11:20

## 2020-09-04 RX ADMIN — Medication 650 MILLIGRAM(S): at 06:52

## 2020-09-04 RX ADMIN — LIDOCAINE 1 PATCH: 4 CREAM TOPICAL at 18:39

## 2020-09-04 RX ADMIN — Medication 650 MILLIGRAM(S): at 23:25

## 2020-09-04 RX ADMIN — Medication 25 MILLIGRAM(S): at 06:02

## 2020-09-04 RX ADMIN — GABAPENTIN 100 MILLIGRAM(S): 400 CAPSULE ORAL at 21:42

## 2020-09-04 RX ADMIN — GABAPENTIN 100 MILLIGRAM(S): 400 CAPSULE ORAL at 11:20

## 2020-09-04 RX ADMIN — Medication 325 MILLIGRAM(S): at 21:41

## 2020-09-04 RX ADMIN — ATORVASTATIN CALCIUM 40 MILLIGRAM(S): 80 TABLET, FILM COATED ORAL at 21:42

## 2020-09-04 RX ADMIN — LIDOCAINE 1 PATCH: 4 CREAM TOPICAL at 11:20

## 2020-09-04 RX ADMIN — RIVAROXABAN 15 MILLIGRAM(S): KIT at 18:53

## 2020-09-04 RX ADMIN — Medication 650 MILLIGRAM(S): at 22:35

## 2020-09-04 NOTE — DIETITIAN INITIAL EVALUATION ADULT. - SIGNS/SYMPTOMS
BMI 16, severe depletion of muscle mass and body fat, suspect eating < 75% of estimated energy needs

## 2020-09-04 NOTE — CONSULT NOTE ADULT - SUBJECTIVE AND OBJECTIVE BOX
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 (03 Sep 2020 15:20)    HPI:  80 yo male PMHx of HTN, CAD s/p stents x 3 (2005), CKD, Afib on Xarelto, s/p Medtronic PPM, myositis, presented to ED 8/30 with left weakness and neck pain. 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 Neurology and neurosurgery consulted. Per neuro evaluation, subcortical CVA suspected, ?right medullary/ pontine but this is uncertain/possibly due to small vessel disease versus cardioembolism related to atrial fibrillation. Unable to confirm with MRI- Pt with PPM that is not compatible with MRI. CTH repeated and stable. TTE EF 65%, no evidence of PFO. Pt with CT spine showing Multilevel cervical spondylosis with severe stenosis at C4-C5. Per Neurosurgery followup with Dr Douglas for aneurysm and C spine stenosis. No surgical intervention at this time. Evaluated by PMR and acute rehab recommended. (03 Sep 2020 15:20)    PAST MEDICAL & SURGICAL HISTORY:  Myositis  Hyperlipidemia  Hypertension  GI bleed  Diverticulitis  No significant past surgical history    SOCIAL HISTORY: admits to drinking 7oz of alcohol per week. denies smoking or illicit drugs use  FAMILY HISTORY: Father history of CAD     ALLERGIES:  No Known Allergies    MEDICATIONS  (STANDING):  allopurinol 100 milliGRAM(s) Oral daily  aspirin enteric coated 81 milliGRAM(s) Oral daily  atorvastatin 40 milliGRAM(s) Oral at bedtime  folic acid 1 milliGRAM(s) Oral daily  gabapentin 100 milliGRAM(s) Oral three times a day  influenza   Vaccine 0.5 milliLiter(s) IntraMuscular once  lidocaine   Patch 1 Patch Transdermal daily  metoprolol tartrate 12.5 milliGRAM(s) Oral two times a day  pantoprazole    Tablet 40 milliGRAM(s) Oral before breakfast  rivaroxaban 15 milliGRAM(s) Oral with dinner  senna 2 Tablet(s) Oral at bedtime  thiamine 100 milliGRAM(s) Oral daily    MEDICATIONS  (PRN):  acetaminophen   Tablet .. 650 milliGRAM(s) Oral every 6 hours PRN Temp greater or equal to 38C (100.4F), Mild Pain (1 - 3)  polyethylene glycol 3350 17 Gram(s) Oral daily PRN Constipation    Review of Systems: Refer to HPI for pertinent positives and negatives. All other ROS reviewed and negative except as otherwise stated above.      Vital Signs Last 24 Hrs  T(F): 98 (04 Sep 2020 08:48), Max: 99.7 (03 Sep 2020 16:34)  HR: 68 (04 Sep 2020 08:48) (68 - 89)  BP: 106/72 (04 Sep 2020 08:48) (106/72 - 134/78)  RR: 16 (04 Sep 2020 08:48) (12 - 16)  SpO2: 99% (04 Sep 2020 08:48) (99% - 100%)  I&O's Summary    03 Sep 2020 07:01  -  04 Sep 2020 07:00  --------------------------------------------------------  IN: 0 mL / OUT: 400 mL / NET: -400 mL    04 Sep 2020 07:01  -  04 Sep 2020 12:39  --------------------------------------------------------  IN: 0 mL / OUT: 100 mL / NET: -100 mL      PHYSICAL EXAM:  GENERAL: NAD, well-groomed, well-developed  HEAD:  Atraumatic, Normocephalic  EYES: EOMI, PERRL, conjunctiva and sclera clear  ENMT: Moist mucous membranes, Good dentition  NECK: Supple, No JVD  CHEST/LUNG: Clear to auscultation bilaterally, non-labored breathing, good air entry  HEART: RRR; S1/S2, No murmur  ABDOMEN: Soft, Nontender, Nondistended; Bowel sounds present  VASCULAR: Normal pulses, Normal capillary refill  EXTREMITIES: No cyanosis, No edema  LYMPH: No lymphadenopathy noted  SKIN: Warm, Intact  PSYCH: Normal mood and affect  NERVOUS SYSTEM:  A/O x3, Good concentration; CN 2-12 intact, No focal deficits, moves all extremities    LABS:                        7.7    4.90  )-----------( 128      ( 04 Sep 2020 05:00 )             21.4     09-04    137  |  102  |  46  ----------------------------<  102  4.1   |  25  |  2.18    Ca    9.7      04 Sep 2020 05:00  Phos  2.9     09-04  Mg     2.3     09-03    TPro  6.6  /  Alb  3.0  /  TBili  0.4  /  DBili  x   /  AST  24  /  ALT  23  /  AlkPhos  65  09-04    eGFR if Non African American: 28 mL/min/1.73M2 (09-04-20 @ 05:00)  eGFR if African American: 32 mL/min/1.73M2 (09-04-20 @ 05:00)        CARDIAC MARKERS ( 04 Sep 2020 05:00 )  <.017 ng/mL / x     / x     / x     / x              RADIOLOGY & ADDITIONAL TESTS:      < from: CT Head No Cont (08.31.20 @ 19:41) >    IMPRESSION:  Stable exam.    No mass effect, hemorrhage or evidence of acute intracranial pathology.    < end of copied text >  Care Discussed with Consultants/Other Providers: Discussed with Rehab team 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 (03 Sep 2020 15:20)    HPI:  80 yo male PMHx of HTN, CAD s/p stents x 3 (2005), CKD, Afib on Xarelto, s/p Medtronic PPM, myositis, presented to ED 8/30 with left weakness and neck pain. 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 Neurology and neurosurgery consulted. Per neuro evaluation, subcortical CVA suspected, ?right medullary/ pontine but this is uncertain/possibly due to small vessel disease versus cardioembolism related to atrial fibrillation. Unable to confirm with MRI- Pt with PPM that is not compatible with MRI. CTH repeated and stable. TTE EF 65%, no evidence of PFO. Pt with CT spine showing Multilevel cervical spondylosis with severe stenosis at C4-C5. Per Neurosurgery followup with Dr Douglas for aneurysm and C spine stenosis. No surgical intervention at this time. Evaluated by PMR and acute rehab recommended. (03 Sep 2020 15:20)    PAST MEDICAL & SURGICAL HISTORY:  Myositis  Hyperlipidemia  Hypertension  GI bleed  Diverticulitis  No significant past surgical history    SOCIAL HISTORY: admits to drinking 7oz of alcohol per week. denies smoking or illicit drugs use  FAMILY HISTORY: Father history of CAD     ALLERGIES:  No Known Allergies    MEDICATIONS  (STANDING):  allopurinol 100 milliGRAM(s) Oral daily  aspirin enteric coated 81 milliGRAM(s) Oral daily  atorvastatin 40 milliGRAM(s) Oral at bedtime  folic acid 1 milliGRAM(s) Oral daily  gabapentin 100 milliGRAM(s) Oral three times a day  influenza   Vaccine 0.5 milliLiter(s) IntraMuscular once  lidocaine   Patch 1 Patch Transdermal daily  metoprolol tartrate 12.5 milliGRAM(s) Oral two times a day  pantoprazole    Tablet 40 milliGRAM(s) Oral before breakfast  rivaroxaban 15 milliGRAM(s) Oral with dinner  senna 2 Tablet(s) Oral at bedtime  thiamine 100 milliGRAM(s) Oral daily    MEDICATIONS  (PRN):  acetaminophen   Tablet .. 650 milliGRAM(s) Oral every 6 hours PRN Temp greater or equal to 38C (100.4F), Mild Pain (1 - 3)  polyethylene glycol 3350 17 Gram(s) Oral daily PRN Constipation    Review of Systems: Refer to HPI for pertinent positives and negatives. All other ROS reviewed and negative except as otherwise stated above.      Vital Signs Last 24 Hrs  T(F): 98 (04 Sep 2020 08:48), Max: 99.7 (03 Sep 2020 16:34)  HR: 68 (04 Sep 2020 08:48) (68 - 89)  BP: 106/72 (04 Sep 2020 08:48) (106/72 - 134/78)  RR: 16 (04 Sep 2020 08:48) (12 - 16)  SpO2: 99% (04 Sep 2020 08:48) (99% - 100%)  I&O's Summary    03 Sep 2020 07:01  -  04 Sep 2020 07:00  --------------------------------------------------------  IN: 0 mL / OUT: 400 mL / NET: -400 mL    04 Sep 2020 07:01  -  04 Sep 2020 12:39  --------------------------------------------------------  IN: 0 mL / OUT: 100 mL / NET: -100 mL      PHYSICAL EXAM:  GENERAL: NAD, well-groomed, well-developed  HEAD:  Atraumatic, Normocephalic  EYES: EOMI, PERRL, conjunctiva and sclera clear  ENMT: Moist mucous membranes, Good dentition  NECK: Supple, No JVD  CHEST/LUNG: Clear to auscultation bilaterally, non-labored breathing, good air entry  HEART: RRR; S1/S2, No murmur  ABDOMEN: Soft, Nontender, Nondistended; Bowel sounds present  VASCULAR: Normal pulses, Normal capillary refill  EXTREMITIES: No cyanosis, No edema  LYMPH: No lymphadenopathy noted  SKIN: Warm, Intact  PSYCH: Normal mood and affect  NERVOUS SYSTEM:  A/O x3, Good concentration; CN 2-12 intact, LUE 5/5,  strength 4/5, LLE 4/5, otherwise 5/5    LABS:                        7.7    4.90  )-----------( 128      ( 04 Sep 2020 05:00 )             21.4     09-04    137  |  102  |  46  ----------------------------<  102  4.1   |  25  |  2.18    Ca    9.7      04 Sep 2020 05:00  Phos  2.9     09-04  Mg     2.3     09-03    TPro  6.6  /  Alb  3.0  /  TBili  0.4  /  DBili  x   /  AST  24  /  ALT  23  /  AlkPhos  65  09-04    eGFR if Non African American: 28 mL/min/1.73M2 (09-04-20 @ 05:00)  eGFR if African American: 32 mL/min/1.73M2 (09-04-20 @ 05:00)        CARDIAC MARKERS ( 04 Sep 2020 05:00 )  <.017 ng/mL / x     / x     / x     / x              RADIOLOGY & ADDITIONAL TESTS:      < from: CT Head No Cont (08.31.20 @ 19:41) >    IMPRESSION:  Stable exam.    No mass effect, hemorrhage or evidence of acute intracranial pathology.    < end of copied text >  Care Discussed with Consultants/Other Providers: Discussed with Rehab team

## 2020-09-04 NOTE — CONSULT NOTE ADULT - SUBJECTIVE AND OBJECTIVE BOX
This is a 79 year old man with a history of hypertension, CAD s/p 3 stents in 2005, CKD baseline creatinine roughly 2.2-2.3 at the hospital, Afib on Xarelto, Medtronic PPM.  Patient was in ED 8/30/20 with left sided weakness and neck pain. CT head and CT angiogram negative.  Suspected subcortical CVA and right medullary/pontine CVA, and small vessel disease as well as a communicating arterial aneurysm.  Unable to get MRI secondary to PPM which may not be compatible  CT spine showing severe C4-C5 stenosis, Patient transferred to rehab. During the hospital admission baseline Hg 8.2g/dl, decreased further to 7.5-7.7g/dl today.  Platelets were initially normal at 197 on 8/30, now decreased to 128, and 136 on repeat today.  Patient's wife describes being aware of the anemia for years. Roughly 10 years ago, patient had seen a hematologist, and had a bone marrow biopsy done, but does not recall a specific diagnosis being described.  Patient Does admit to drinking alcohol which he describes as about 6 ounces a week. Wife later described this being closer to 4 servings a day of vodka.        PAST MEDICAL & SURGICAL HISTORY:  Myositis  Hyperlipidemia  Hypertension  GI bleed  Diverticulitis  No significant past surgical history  Social History:  Smoking - Denied  EtOH - Patient admits to modest drinking about 6 oz in a week. Wife at bedside letter describes this is really more like 2 double shots of Vodka nightly for many years at least.    Drugs - +smokes marijuana    MEDICATIONS  (STANDING):  allopurinol 100 milliGRAM(s) Oral daily  aspirin enteric coated 81 milliGRAM(s) Oral daily  atorvastatin 40 milliGRAM(s) Oral at bedtime  ferrous    sulfate 325 milliGRAM(s) Oral daily  folic acid 1 milliGRAM(s) Oral daily  gabapentin 100 milliGRAM(s) Oral three times a day  influenza   Vaccine 0.5 milliLiter(s) IntraMuscular once  lidocaine   Patch 1 Patch Transdermal daily  metoprolol tartrate 12.5 milliGRAM(s) Oral two times a day  pantoprazole    Tablet 40 milliGRAM(s) Oral before breakfast  rivaroxaban 15 milliGRAM(s) Oral with dinner  senna 2 Tablet(s) Oral at bedtime  thiamine 100 milliGRAM(s) Oral daily    MEDICATIONS  (PRN):  acetaminophen   Tablet .. 650 milliGRAM(s) Oral every 6 hours PRN Temp greater or equal to 38C (100.4F), Mild Pain (1 - 3)  polyethylene glycol 3350 17 Gram(s) Oral daily PRN Constipation      ICU Vital Signs Last 24 Hrs  T(C): 36.7 (04 Sep 2020 08:48), Max: 37.3 (03 Sep 2020 20:50)  T(F): 98 (04 Sep 2020 08:48), Max: 99.2 (03 Sep 2020 20:50)  HR: 68 (04 Sep 2020 08:48) (68 - 76)  BP: 106/72 (04 Sep 2020 08:48) (106/72 - 134/78)  BP(mean): --  ABP: --  ABP(mean): --  RR: 16 (04 Sep 2020 08:48) (14 - 16)  SpO2: 99% (04 Sep 2020 08:48) (99% - 100%)    CBC Full  -  ( 04 Sep 2020 13:30 )  WBC Count : 4.89 K/uL  RBC Count : 2.08 M/uL  Hemoglobin : 7.5 g/dL  Hematocrit : 21.2 %  Platelet Count - Automated : 136 K/uL  Mean Cell Volume : 101.9 fl  Mean Cell Hemoglobin : 36.1 pg  Mean Cell Hemoglobin Concentration : 35.4 gm/dL  Auto Neutrophil # : x  Auto Lymphocyte # : x  Auto Monocyte # : x  Auto Eosinophil # : x  Auto Basophil # : x  Auto Neutrophil % : x  Auto Lymphocyte % : x  Auto Monocyte % : x  Auto Eosinophil % : x  Auto Basophil % : x    09-04    137  |  102  |  46<H>  ----------------------------<  102<H>  4.1   |  25  |  2.18<H>    Ca    9.7      04 Sep 2020 05:00  Phos  2.9     09-04  Mg     2.3     09-03    TPro  6.6  /  Alb  3.0<L>  /  TBili  0.4  /  DBili  x   /  AST  24  /  ALT  23  /  AlkPhos  65  09-04    From August 31st anemia workup   Folate 18.5, B12 > 2000, Ferritin 76 TIBC 252  Bilirubin 0.4   Haptoglobin 103.

## 2020-09-04 NOTE — CONSULT NOTE ADULT - ASSESSMENT
Peripheral smear reviewed  Platelets with tad morphology, normal granulations, occasional large, no giant platelets no clumping visualized.  RBC with anisocytosis, several stomatocytes, target cells, and elliptocytes Very rare schistocytes.  No yvette cells or spur cells.   WBCs with some abnormalities, multiple segmented neutrophils with hypolobulation.  No premature or early WBC noted, no blasts.    Findings consistent with both chronic alcohol use or MDS, difficult to distinguish.      This is a 79 year old man with a history of hypertension, CAD s/p 3 stents in 2005, CKD baseline creatinine roughly 2.2-2.3 at the hospital, Afib on Xarelto, Medtronic PPM. Patient now in rehab following CVA.  Patient has a new mild thrombocytopenia platelets now 128-136, as well as an acute on chronic anemia.  Wife states that patient had known about the anemia for years.  Suspect patient may have had an a multifactorial anemia from chronic alcohol use which is more recently worsening from anemia of chronic disease and anemia from renal insufficiency  Check Erythropoietin level and reticulocyte count.        anemia secondary to chronic alcohol use then a more acute anemia on top of this fro    Recommend transfusion of PRBC to keep Hg > 8g/dl in face of symptomatic anemia, patient does have syncopal and pre-syncopal episodes.  Would take roughly one unit.      B12 and folic acid are normal, though the use of alcohol can precipitate a functional folic acid deficiency Will order RBC folate r/o functional folic acid deficiency would not check MMA or homocysteine in setting of renal insufficiency, that elevates results unpredictably.  Check SPEP, Immunofixation Free light chains and immunoglobulins, screen for plasma cell dyscrasia.     Ferritin normal at 77, TIBC normal, does not appear to be iron deficiency can stop PO iron it is unlikely that is helping.

## 2020-09-04 NOTE — DIETITIAN INITIAL EVALUATION ADULT. - ADD RECOMMEND
1. Continue diet as ordered. 2. Enlive BID with meals recommended. 3. Educated on high calorie options at meals. 4. Recommend daily MVI with minerals. Continue thiamine and folic acid per medical team r/t ETOH and substance history.

## 2020-09-04 NOTE — PROVIDER CONTACT NOTE (OTHER) - ASSESSMENT
Pt is A&O x 4, able to make needs known. Denies any pain or discomfort. No acute distress noted. Vital signs as per documentation.

## 2020-09-04 NOTE — CONSULT NOTE ADULT - ASSESSMENT
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      Gait Instability, ADL impairments and Functional impairments: Comprehensive Rehab Program of PT/OT/SLP     # left hemiparesis suspect from subcortical CVA  -CT head: negative acute infarct  -unable to obtain MRI due to PPM not compatible  -Xarelto, ASA and statin    # Near syncope suspect from orthostatic hypotension  - pt appeared dry. IVF bolus ordered  - check orthostatic VS  - reduce metoprolol to 12.5mg BID for now, monitor BP and symptoms closely    # Macrocytic anemia  - suspect due to ETOH and chronic renal disease  - anemia work up reviewed  - transfuse if Hb< 7    # acute thrombocytopenia?  - no record of past cbc  - Platelet 197 on 8/30  - platelet 131 on 9/2 and then hovers around that number since  - pt has a history of ETOH dependence. suspect pt has baseline thrombocytopenia from ETOH  - no heparin exposure in [facility]  - hematology workup    # ACOM aneurysm  - f/u with Dr. Douglas, neurosurgery as outpatient    # CAD s/p stents  - cont ASA, statin, metoprolol.   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      Gait Instability, ADL impairments and Functional impairments: Comprehensive [facility]hab Program of PT/OT/SLP     # left hemiparesis suspect from subcortical CVA  -CT head: negative acute infarct  -unable to obtain MRI due to PPM not compatible  -Xarelto, ASA and statin    # Near syncope suspect from orthostatic hypotension  - pt appeared dry. IVF bolus ordered  - check orthostatic VS  - reduce metoprolol to 12.5mg BID for now, monitor BP and symptoms closely    # Macrocytic anemia  - suspect due to ETOH and chronic renal disease  - anemia work up reviewed  - transfuse if Hb< 7    # acute thrombocytopenia?  - no record of past cbc  - Platelet 197 on 8/30  - platelet 131 on 9/2 and then hovers around that number since  - pt has a history of ETOH dependence. suspect pt has baseline thrombocytopenia from ETOH  - no heparin exposure in [facility]  - hematology workup    # ETOH dependence   - thiamine and folic acid    # ACOM aneurysm  - f/u with Dr. Douglas, neurosurgery as outpatient    # CAD s/p stents  - cont ASA, statin, metoprolol.   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      Gait Instability, ADL impairments and Functional impairments: [facility] Program of PT/OT/SLP     # left hemiparesis suspect from subcortical CVA  -CT head: negative acute infarct  -unable to obtain MRI due to PPM not compatible  -Xarelto, ASA and statin    # Near syncope suspect from orthostatic hypotension  - pt appeared dry. IVF bolus ordered  - check orthostatic VS  - reduce metoprolol to 12.5mg BID for now, monitor BP and symptoms closely    # Macrocytic anemia  - suspect due to ETOH and chronic renal disease  - anemia work up reviewed  - transfuse if Hb< 7    # acute thrombocytopenia?  - no record of past cbc  - Platelet 197 on 8/30  - platelet 131 on 9/2 and then hovers around that number since  - pt has a history of ETOH dependence. suspect pt has baseline thrombocytopenia from ETOH  - no heparin exposure in [facility]  - hematology workup    # ETOH dependence   - thiamine and folic acid    # ACOM aneurysm  - f/u with Dr. Douglas, neurosurgery as outpatient    # CAD s/p stents  - cont ASA, statin, metoprolol.   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      Gait Instability, ADL impairments and Functional impairments: Comprehensive [facility] Program of PT/OT/SLP     # left hemiparesis suspect from subcortical CVA  -CT head: negative acute infarct  -unable to obtain MRI due to PPM not compatible  -Xarelto, ASA and statin    # Near syncope suspect from orthostatic hypotension  - pt appeared dry. IVF bolus ordered  - check orthostatic VS  - reduce metoprolol to 12.5mg BID for now, monitor BP and symptoms closely    # Macrocytic anemia  - suspect due to ETOH and chronic renal disease  - anemia work up reviewed, wnl  - check occult stool  - transfuse if Hb< 7    # acute thrombocytopenia?  - no record of past cbc  - Platelet 197 on 8/30  - platelet 131 on 9/2 and then hovers around that number since  - pt has a history of ETOH dependence. suspect ETOH as a component of thrombocytopenia   - no heparin exposure in [facility]  - hematology workup    # ETOH dependence   - thiamine and folic acid    # ACOM aneurysm  - f/u with Dr. Douglas, neurosurgery as outpatient    # CAD s/p stents  - cont ASA, statin, metoprolol.   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      Gait Instability, ADL impairments and Functional impairments: Comprehensive [facility] Program of PT/OT/SLP     # left hemiparesis suspect from subcortical CVA  -CT head: negative acute infarct  -unable to obtain MRI due to PPM not compatible  -Xarelto, ASA and statin    # Near syncope suspect from orthostatic hypotension  - pt appeared dry. IVF bolus ordered  - check orthostatic VS  - reduce metoprolol to 12.5mg BID for now, monitor BP and symptoms closely    # Macrocytic anemia  - suspect due to ETOH and chronic renal disease  - anemia work up reviewed, wnl  - check occult stool; no signs of active bleeding  - transfuse if Hb< 7    # acute thrombocytopenia?  - no record of past cbc  - Platelet 197 on 8/30  - platelet 131 on 9/2 and then hovers around that number since  - pt has a history of ETOH dependence. suspect ETOH as a component of thrombocytopenia   - no heparin exposure in [facility]  - hematology workup    # ETOH dependence   - thiamine and folic acid    # ACOM aneurysm  - f/u with Dr. Douglas, neurosurgery as outpatient    # CAD s/p stents  - cont ASA, statin, metoprolol.   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      Gait Instability, ADL impairments and Functional impairments: Comprehensive Rehab Program of PT/OT/SLP     # left hemiparesis suspect from subcortical CVA  -CT head: negative acute infarct  -unable to obtain MRI due to PPM not compatible  -Xarelto, ASA and statin    # Near syncope suspect from orthostatic hypotension  - pt appeared dry. IVF bolus ordered  - check orthostatic VS  - reduce metoprolol to 12.5mg BID for now, monitor BP and symptoms closely    # Macrocytic anemia  - suspect due to ETOH and chronic renal disease  - anemia work up reviewed, wnl  - check occult stool; no signs of active bleeding  - transfuse if Hb< 7    # Thrombocytopenia  - no record of past cbc  - Platelet 197 on 8/30  - platelet 131 on 9/2 and then hovers around that number since  - no heparin exposure in [facility]  - hematology consult    # ETOH dependence   - thiamine and folic acid    # ACOM aneurysm  - f/u with Dr. Douglas, neurosurgery as outpatient    # CAD s/p stents  - cont ASA, statin, metoprolol.   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    Gait Instability, ADL impairments and Functional impairments: Comprehensive [facility]hab Program of PT/OT/SLP     # left hemiparesis suspect from subcortical CVA  -CT head: negative acute infarct  -unable to obtain MRI due to PPM not compatible  -Xarelto, ASA and statin    # Near syncope suspect from orthostatic hypotension  - pt appeared dry. IVF bolus ordered  - check orthostatic VS  - reduce metoprolol to 12.5mg BID for now, monitor BP and symptoms closely    # Macrocytic anemia  - suspect due to ETOH and chronic renal disease  - per pt, his hematologist outpt prescribed iron supplement. will order iron  - per pt, his FOBT was positive outpatient  - check occult stool; no signs of active bleeding  - transfuse if Hb< 7  - follow up GI outpatient for colonoscopy    # Thrombocytopenia  - no record of past cbc  - Platelet 197 on 8/30  - platelet 131 on 9/2 and then hovers around that number since  - no heparin exposure in [facility]  - hematology consult    # ETOH dependence   - thiamine and folic acid    # ACOM aneurysm  - f/u with Dr. Douglas, neurosurgery as outpatient    # CAD s/p stents  - cont ASA, statin, metoprolol.   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    Gait Instability, ADL impairments and Functional impairments: Comprehensive Rehab Program of PT/OT/SLP     # left hemiparesis suspect from subcortical CVA  -CT head: negative acute infarct  -unable to obtain MRI due to PPM not compatible  -Xarelto, ASA and statin    # Near syncope suspect from orthostatic hypotension   - pt appeared dry. IVF bolus ordered  - check orthostatic VS  - reduce metoprolol to 12.5mg BID for now, monitor BP and symptoms closely    # Macrocytic anemia  - suspect due to ETOH and chronic renal disease  - per pt, his hematologist outpt prescribed iron supplement. will order iron  - per pt, his FOBT was positive outpatient  - check occult stool; no signs of active bleeding  - transfuse if Hb< 7  - follow up GI outpatient for colonoscopy    # Thrombocytopenia  - no record of past cbc  - Platelet 197 on 8/30  - platelet 131 on 9/2 and then hovers around that number since  - no heparin exposure in [facility]  - hematology consult    # ETOH dependence   - thiamine and folic acid    # ACOM aneurysm  - f/u with Dr. Douglas, neurosurgery as outpatient    # CAD s/p stents  - cont ASA, statin, metoprolol.   - metoprolol reduced to 12.5mg BID as above    # A fib   - cont Metoprolol and xarelto  - currently in NSR    # HTN  - metoprolol    # Neck pain/ Cervical spine stenosis   - Neurontin 100mg TID  - Tylenol PRN, oxycodone PRN  - Neurosurgical follow as outpatient     dVT ppx on Xarelto    plan discussed with pt and rehab team

## 2020-09-04 NOTE — CHART NOTE - TREATMENT: THE FOLLOWING DIET HAS BEEN RECOMMENDED
Diet, Regular:   DASH/TLC {Sodium & Cholesterol Restricted} (DASH) (09-03-20 @ 15:49) [active]  Diet, DASH/TLC:   Sodium & Cholesterol Restricted  Supplement Feeding Modality:  Oral  Ensure Enlive Cans or Servings Per Day:  1       Frequency:  Two Times a day (09-04-20 @ 14:36) [pending]    Recommend daily MVI

## 2020-09-04 NOTE — DIETITIAN INITIAL EVALUATION ADULT. - PERTINENT MEDS FT
MEDICATIONS  (STANDING):  allopurinol 100 milliGRAM(s) Oral daily  aspirin enteric coated 81 milliGRAM(s) Oral daily  atorvastatin 40 milliGRAM(s) Oral at bedtime  ferrous    sulfate 325 milliGRAM(s) Oral daily  folic acid 1 milliGRAM(s) Oral daily  gabapentin 100 milliGRAM(s) Oral three times a day  influenza   Vaccine 0.5 milliLiter(s) IntraMuscular once  lidocaine   Patch 1 Patch Transdermal daily  metoprolol tartrate 12.5 milliGRAM(s) Oral two times a day  pantoprazole    Tablet 40 milliGRAM(s) Oral before breakfast  rivaroxaban 15 milliGRAM(s) Oral with dinner  senna 2 Tablet(s) Oral at bedtime  thiamine 100 milliGRAM(s) Oral daily    MEDICATIONS  (PRN):  acetaminophen   Tablet .. 650 milliGRAM(s) Oral every 6 hours PRN Temp greater or equal to 38C (100.4F), Mild Pain (1 - 3)  polyethylene glycol 3350 17 Gram(s) Oral daily PRN Constipation

## 2020-09-04 NOTE — PROVIDER CONTACT NOTE (OTHER) - BACKGROUND
Pt admitted s/p CVA. Hx of HTN, HLD, diverticulitis, GI bleed. RRT called this morning, orthostatic BP.

## 2020-09-04 NOTE — DIETITIAN INITIAL EVALUATION ADULT. - PERTINENT LABORATORY DATA
CBC Full  -  ( 04 Sep 2020 13:30 )  Hemoglobin : 7.5 g/dL  Hematocrit : 21.2 %  Glu 161, Cr 1.48, BUN 29, , GFR 43

## 2020-09-04 NOTE — PROGRESS NOTE ADULT - SUBJECTIVE AND OBJECTIVE BOX
CHIEF COMPLAINT: Lightheadedness, low BP, orthostatic response in therapy, pre-syncope.       HISTORY OF PRESENT ILLNESS  78 yo male PMHx of HTN, CAD s/p stents x 3 (2005), CKD, Afib on Xarelto, s/p Medtronic PPM, myositis, presented to ED 8/30 with left weakness and neck pain. 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 Neurology and neurosurgery consulted. Per neuro evaluation, subcortical CVA suspected, ?right medullary/ pontine but this is uncertain/possibly due to small vessel disease versus cardioembolism related to atrial fibrillation. Unable to confirm with MRI- Pt with PPM that is not compatible with MRI. CTH repeated and stable. TTE EF 65%, no evidence of PFO. Pt with CT spine showing Multilevel cervical spondylosis with severe stenosis at C4-C5. Per Neurosurgery followup with Dr Douglas for aneurysm and C spine stenosis. No surgical intervention at this time. Evaluated by PMR and acute rehab recommended. (03 Sep 2020 15:20)      PAST MEDICAL & SURGICAL HISTORY:  Myositis  Hyperlipidemia  Hypertension  GI bleed  Diverticulitis  No significant past surgical history      VITALS  Vital Signs Last 24 Hrs  T(C): 36.7 (04 Sep 2020 08:48), Max: 37.6 (03 Sep 2020 16:34)  T(F): 98 (04 Sep 2020 08:48), Max: 99.7 (03 Sep 2020 16:34)  HR: 68 (04 Sep 2020 08:48) (68 - 89)  BP: 106/72 (04 Sep 2020 08:48) (106/72 - 134/78)  BP(mean): --  RR: 16 (04 Sep 2020 08:48) (12 - 16)  SpO2: 99% (04 Sep 2020 08:48) (99% - 100%)      PHYSICAL EXAM  Constitutional - NAD reclining in bed  Neck - Supple, No limited ROM  Chest - CTA bilaterally  Cardiovascular - RR  Abdomen - BS+, Soft, NTND  Extremities - No C/C/E, No calf tenderness   Skin-no rash  Wounds-none      Neurologic Exam - no new weakness this am, alert and awake.      Balance - impaired  Psychiatric-Mood stable       FUNCTIONAL PROGRESS  Gait - eval  ADLs - eval  Transfers - eval  Functional transfer - eval    RECENT LABS                        7.7    4.90  )-----------( 128      ( 04 Sep 2020 05:00 )             21.4     09-04    137  |  102  |  46<H>  ----------------------------<  102<H>  4.1   |  25  |  2.18<H>    Ca    9.7      04 Sep 2020 05:00  Phos  2.9     09-04  Mg     2.3     09-03    TPro  6.6  /  Alb  3.0<L>  /  TBili  0.4  /  DBili  x   /  AST  24  /  ALT  23  /  AlkPhos  65  09-04      LIVER FUNCTIONS - ( 04 Sep 2020 05:00 )  Alb: 3.0 g/dL / Pro: 6.6 g/dL / ALK PHOS: 65 U/L / ALT: 23 U/L / AST: 24 U/L / GGT: x             Direct LDL: 85 mg/dL (08-31-20 @ 06:52)    RADIOLOGY/OTHER RESULTS    CURRENT MEDICATIONS  MEDICATIONS  (STANDING):  allopurinol 100 milliGRAM(s) Oral daily  aspirin enteric coated 81 milliGRAM(s) Oral daily  atorvastatin 40 milliGRAM(s) Oral at bedtime  folic acid 1 milliGRAM(s) Oral daily  gabapentin 100 milliGRAM(s) Oral three times a day  influenza   Vaccine 0.5 milliLiter(s) IntraMuscular once  lidocaine   Patch 1 Patch Transdermal daily  metoprolol tartrate 12.5 milliGRAM(s) Oral two times a day  pantoprazole    Tablet 40 milliGRAM(s) Oral before breakfast  rivaroxaban 15 milliGRAM(s) Oral with dinner  senna 2 Tablet(s) Oral at bedtime  thiamine 100 milliGRAM(s) Oral daily    MEDICATIONS  (PRN):  acetaminophen   Tablet .. 650 milliGRAM(s) Oral every 6 hours PRN Temp greater or equal to 38C (100.4F), Mild Pain (1 - 3)  polyethylene glycol 3350 17 Gram(s) Oral daily PRN Constipation      ASSESSMENT & PLAN      GI/Bowel Management - senna, check guaiac, labs following  maagement - Toilet Q2  Skin - Turn Q2  Pain - Tylenol PRN  DVT PPX - Xarelto     begin comprehensive rehab program consisting of 3hrs/day of OT/PT and SLP.

## 2020-09-04 NOTE — DIETITIAN INITIAL EVALUATION ADULT. - OTHER INFO
79 y.o male pmh of CAD s/p stents x 3 (2005), CKD, Afib on Xarelto, HTN,  s/p Medtronic PPM, myositis, presents with weakness and neck pain, possible right sided subcortical CVA with left sided weakness and with decreased functional mobility, gait instability and ADL impairments. Per medical history in chart, patient drinks 1-2 shots of liquor on most days and currently receiving thiamine and folic acid. Recommend MVI daily. Reports to writer his UBW ~150# and feels he hasn't had a change in appetite. Lives with wife who cooks for him usually. However, suspect patient has had inadequate as observed severe depletion of muscle and fat to orbital region as well as muscle loss to temporal region. Observed severe depletion of muscle mass to temporal and orbital regions, severe depletion of body fat to orbital region and BMI of 16.   Reports UBW~ 150# and current weight 123#. Suspect eating less than 75% of estimated needs > 1 month and hx of ETOH/substance abuse.

## 2020-09-04 NOTE — PROGRESS NOTE ADULT - ASSESSMENT
EVERARDO FIGUEROA is a 80yo Male with possible right sided subcortical CVA with left sided weakness and with decreased functional mobility, gait instability and ADL impairments.    - COMORBIDITES/ACTIVE MEDICAL ISSUES     Gait Instability, ADL impairments and Functional impairments: start Comprehensive Rehab Program of PT/OT/SLP     #CVA  -start PT/OT/SLP  -Xarelto daily for Hx Afib, check Guaiac for HH 7.7, no overt bleeding  -ASA  for CAD   - Lipitor for goal LDL < 70  - GI ppx  Protonix       #Hx Unruptured aneurysm  -outpt follow up Neurosurgery  -no headache this am    #HTN  -Lopressor BID to 12.5mg as per hospitalist plan. Orthostatic response with pre-syncope this am in therapy. RRT called. Received IVF bolus and reclined promptly with BP recovery to 110s. Will follow hospitalist rec's.     #Anemia/thrombocytopenia  -Check guaiacs for HH 7.7 while on Xarelto, no overt bleeding reported  -labs to repeat today  -hematology evaluation requested to Dr Peraza    #Neck pain/ Cervical spine stenosis   - Neurontin TID  - Tylenol PRN, oxycodone held  - Neurosurgical follow as outpatient     GI/Bowel Mgmt - Senna,  Miralax PRN  /Bladder Mgmt - Voiding independently, PVRx1 and low      Precautions / PROPHYLAXIS:   - Falls, Cardiac, Seizure   - Ortho: Weight bearing status: WBAT   - Lungs: Aspiration, Incentive Spirometer   - Pressure injury/Skin: Turn Q2hrs while in bed, OOB to Chair, PT/OT    - DVT: Xarelto

## 2020-09-04 NOTE — PROGRESS NOTE ADULT - ATTENDING COMMENTS
Hypotensive near syncopal episode in therapy this morning with PB dropping to 85/60 mmHg. RTT called. Returned to baseline and responded well to IVF bolus, neurological exam stable without new focal deficits. BP regimen discussed with Medicine, and medications adjusted  Will continue to monitor closely.

## 2020-09-05 LAB
ABO RH CONFIRMATION: SIGNIFICANT CHANGE UP
ANION GAP SERPL CALC-SCNC: 11 MMOL/L — SIGNIFICANT CHANGE UP (ref 5–17)
BASOPHILS # BLD AUTO: 0.02 K/UL — SIGNIFICANT CHANGE UP (ref 0–0.2)
BASOPHILS NFR BLD AUTO: 0.4 % — SIGNIFICANT CHANGE UP (ref 0–2)
BUN SERPL-MCNC: 46 MG/DL — HIGH (ref 7–23)
CALCIUM SERPL-MCNC: 9.6 MG/DL — SIGNIFICANT CHANGE UP (ref 8.4–10.5)
CHLORIDE SERPL-SCNC: 104 MMOL/L — SIGNIFICANT CHANGE UP (ref 96–108)
CO2 SERPL-SCNC: 23 MMOL/L — SIGNIFICANT CHANGE UP (ref 22–31)
CREAT SERPL-MCNC: 2 MG/DL — HIGH (ref 0.5–1.3)
EOSINOPHIL # BLD AUTO: 0.1 K/UL — SIGNIFICANT CHANGE UP (ref 0–0.5)
EOSINOPHIL NFR BLD AUTO: 1.9 % — SIGNIFICANT CHANGE UP (ref 0–6)
FERRITIN SERPL-MCNC: 171 NG/ML — SIGNIFICANT CHANGE UP (ref 30–400)
GLUCOSE SERPL-MCNC: 89 MG/DL — SIGNIFICANT CHANGE UP (ref 70–99)
HCT VFR BLD CALC: 25.4 % — LOW (ref 39–50)
HCT VFR BLD CALC: 25.7 % — LOW (ref 39–50)
HCT VFR BLD CALC: 28 % — LOW (ref 39–50)
HGB BLD-MCNC: 8.9 G/DL — LOW (ref 13–17)
HGB BLD-MCNC: 9.9 G/DL — LOW (ref 13–17)
IMM GRANULOCYTES NFR BLD AUTO: 0.4 % — SIGNIFICANT CHANGE UP (ref 0–1.5)
LYMPHOCYTES # BLD AUTO: 1.08 K/UL — SIGNIFICANT CHANGE UP (ref 1–3.3)
LYMPHOCYTES # BLD AUTO: 20.3 % — SIGNIFICANT CHANGE UP (ref 13–44)
MCHC RBC-ENTMCNC: 33.3 PG — SIGNIFICANT CHANGE UP (ref 27–34)
MCHC RBC-ENTMCNC: 34.6 GM/DL — SIGNIFICANT CHANGE UP (ref 32–36)
MCV RBC AUTO: 96.3 FL — SIGNIFICANT CHANGE UP (ref 80–100)
MONOCYTES # BLD AUTO: 0.68 K/UL — SIGNIFICANT CHANGE UP (ref 0–0.9)
MONOCYTES NFR BLD AUTO: 12.8 % — SIGNIFICANT CHANGE UP (ref 2–14)
NEUTROPHILS # BLD AUTO: 3.43 K/UL — SIGNIFICANT CHANGE UP (ref 1.8–7.4)
NEUTROPHILS NFR BLD AUTO: 64.2 % — SIGNIFICANT CHANGE UP (ref 43–77)
NRBC # BLD: 0 /100 WBCS — SIGNIFICANT CHANGE UP (ref 0–0)
PLATELET # BLD AUTO: 127 K/UL — LOW (ref 150–400)
POTASSIUM SERPL-MCNC: 4.3 MMOL/L — SIGNIFICANT CHANGE UP (ref 3.5–5.3)
POTASSIUM SERPL-SCNC: 4.3 MMOL/L — SIGNIFICANT CHANGE UP (ref 3.5–5.3)
PROT SERPL-MCNC: 5.7 G/DL — LOW (ref 6–8.3)
PROT SERPL-MCNC: 5.7 G/DL — LOW (ref 6–8.3)
RBC # BLD: 2.67 M/UL — LOW (ref 4.2–5.8)
RBC # BLD: 2.67 M/UL — LOW (ref 4.2–5.8)
RBC # FLD: 16 % — HIGH (ref 10.3–14.5)
RETICS #: 43 K/UL — SIGNIFICANT CHANGE UP (ref 25–125)
RETICS/RBC NFR: 1.6 % — SIGNIFICANT CHANGE UP (ref 0.5–2.5)
SODIUM SERPL-SCNC: 138 MMOL/L — SIGNIFICANT CHANGE UP (ref 135–145)
WBC # BLD: 5.33 K/UL — SIGNIFICANT CHANGE UP (ref 3.8–10.5)
WBC # FLD AUTO: 5.33 K/UL — SIGNIFICANT CHANGE UP (ref 3.8–10.5)

## 2020-09-05 PROCEDURE — 99233 SBSQ HOSP IP/OBS HIGH 50: CPT

## 2020-09-05 PROCEDURE — 99232 SBSQ HOSP IP/OBS MODERATE 35: CPT

## 2020-09-05 RX ADMIN — Medication 650 MILLIGRAM(S): at 13:22

## 2020-09-05 RX ADMIN — Medication 100 MILLIGRAM(S): at 13:24

## 2020-09-05 RX ADMIN — GABAPENTIN 100 MILLIGRAM(S): 400 CAPSULE ORAL at 21:15

## 2020-09-05 RX ADMIN — Medication 325 MILLIGRAM(S): at 13:23

## 2020-09-05 RX ADMIN — PANTOPRAZOLE SODIUM 40 MILLIGRAM(S): 20 TABLET, DELAYED RELEASE ORAL at 05:32

## 2020-09-05 RX ADMIN — ATORVASTATIN CALCIUM 40 MILLIGRAM(S): 80 TABLET, FILM COATED ORAL at 21:16

## 2020-09-05 RX ADMIN — Medication 650 MILLIGRAM(S): at 23:06

## 2020-09-05 RX ADMIN — Medication 1 MILLIGRAM(S): at 13:22

## 2020-09-05 RX ADMIN — SENNA PLUS 2 TABLET(S): 8.6 TABLET ORAL at 21:16

## 2020-09-05 RX ADMIN — Medication 650 MILLIGRAM(S): at 06:25

## 2020-09-05 RX ADMIN — Medication 650 MILLIGRAM(S): at 22:05

## 2020-09-05 RX ADMIN — Medication 81 MILLIGRAM(S): at 13:24

## 2020-09-05 RX ADMIN — LIDOCAINE 1 PATCH: 4 CREAM TOPICAL at 19:31

## 2020-09-05 RX ADMIN — RIVAROXABAN 15 MILLIGRAM(S): KIT at 17:26

## 2020-09-05 RX ADMIN — Medication 12.5 MILLIGRAM(S): at 17:26

## 2020-09-05 RX ADMIN — Medication 100 MILLIGRAM(S): at 13:23

## 2020-09-05 RX ADMIN — GABAPENTIN 100 MILLIGRAM(S): 400 CAPSULE ORAL at 05:31

## 2020-09-05 RX ADMIN — Medication 650 MILLIGRAM(S): at 19:32

## 2020-09-05 RX ADMIN — Medication 25 MILLIGRAM(S): at 02:54

## 2020-09-05 RX ADMIN — GABAPENTIN 100 MILLIGRAM(S): 400 CAPSULE ORAL at 13:24

## 2020-09-05 RX ADMIN — Medication 12.5 MILLIGRAM(S): at 05:32

## 2020-09-05 RX ADMIN — Medication 650 MILLIGRAM(S): at 05:31

## 2020-09-05 RX ADMIN — LIDOCAINE 1 PATCH: 4 CREAM TOPICAL at 13:24

## 2020-09-05 NOTE — PROGRESS NOTE ADULT - ASSESSMENT
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    Gait Instability, ADL impairments and Functional impairments: Comprehensive Rehab Program of PT/OT/SLP     # left hemiparesis suspect from subcortical CVA  -CT head: negative acute infarct  -unable to obtain MRI due to PPM not compatible  -Xarelto, ASA and statin    # Near syncope suspect from orthostatic hypotension   - pt appeared dry. IVF bolus ordered  - check orthostatic VS  - reduce metoprolol to 12.5mg BID for now, monitor BP and symptoms closely    # Macrocytic anemia  - suspect due to ETOH and chronic renal disease  - per pt, his hematologist outpt prescribed iron supplement. will order iron  - per pt, his FOBT was positive outpatient  - check occult stool; no signs of active bleeding  - transfuse if Hb< 7  - follow up GI outpatient for colonoscopy    # Thrombocytopenia  - no record of past cbc  - Platelet 197 on 8/30  - platelet 131 on 9/2 and then hovers around that number since  - no heparin exposure in [facility]  - hematology consult    # ETOH dependence   - thiamine and folic acid    # ACOM aneurysm  - f/u with Dr. Douglas, neurosurgery as outpatient    # CAD s/p stents  - cont ASA, statin, metoprolol.   - metoprolol reduced to 12.5mg BID as above    # A fib   - cont Metoprolol and xarelto  - currently in NSR    # HTN  - metoprolol    # Neck pain/ Cervical spine stenosis   - Neurontin 100mg TID  - Tylenol PRN, oxycodone PRN  - Neurosurgical follow as outpatient

## 2020-09-05 NOTE — PROGRESS NOTE ADULT - SUBJECTIVE AND OBJECTIVE BOX
Hospitalist: Joel Lopez DO    CHIEF COMPLAINT: Patient is a 79y old  male who presents with a chief complaint of 1.2 s/p right sided cva with left sided weakness (04 Sep 2020 17:32)    SUBJECTIVE / OVERNIGHT EVENTS: Patient seen and examined. No acute events overnight. Pain well controlled and patient without any complaints.    MEDICATIONS  (STANDING):  allopurinol 100 milliGRAM(s) Oral daily  aspirin enteric coated 81 milliGRAM(s) Oral daily  atorvastatin 40 milliGRAM(s) Oral at bedtime  ferrous    sulfate 325 milliGRAM(s) Oral daily  folic acid 1 milliGRAM(s) Oral daily  gabapentin 100 milliGRAM(s) Oral three times a day  influenza   Vaccine 0.5 milliLiter(s) IntraMuscular once  lidocaine   Patch 1 Patch Transdermal daily  metoprolol tartrate 12.5 milliGRAM(s) Oral two times a day  pantoprazole    Tablet 40 milliGRAM(s) Oral before breakfast  rivaroxaban 15 milliGRAM(s) Oral with dinner  senna 2 Tablet(s) Oral at bedtime  thiamine 100 milliGRAM(s) Oral daily    MEDICATIONS  (PRN):  acetaminophen   Tablet .. 650 milliGRAM(s) Oral every 6 hours PRN Temp greater or equal to 38C (100.4F), Mild Pain (1 - 3)  polyethylene glycol 3350 17 Gram(s) Oral daily PRN Constipation      VITALS:  T(F): 98.9 (20 @ 08:20), Max: 100.9 (20 @ 22:20)  HR: 60 (20 @ 08:20) (60 - 72)  BP: 125/75 (20 @ 08:20) (124/69 - 145/57)  RR: 16 (20 @ 08:20) (14 - 16)  SpO2: 98% (20 @ 08:20)    REVIEW OF SYSTEMS:  For ROV please refer back to H&P     PHYSICAL EXAM:  GENERAL: NAD, well-groomed, well-developed  HEAD:  Atraumatic, Normocephalic  EYES: EOMI, PERRL, conjunctiva and sclera clear  ENMT: Moist mucous membranes, Good dentition  NECK: Supple, No JVD  CHEST/LUNG: Clear to auscultation bilaterally, non-labored breathing, good air entry  HEART: RRR; S1/S2, No murmur  ABDOMEN: Soft, Nontender, Nondistended; Bowel sounds present  VASCULAR: Normal pulses, Normal capillary refill  EXTREMITIES: No cyanosis, No edema  LYMPH: No lymphadenopathy noted    LABS:              9.9                  x    | x    | x            x     >-----------< x       ------------------------< x                     28.0                 x    | x    | x                                            Ca x     Mg x     Ph x           TPro  5.7  /  Alb  x       TBili  x   /  DBili  x         AST  x   /  ALT  x             AlkPhos  x         Urinalysis Basic - ( 04 Sep 2020 22:03 )    Color: Yellow / Appearance: Clear / S.010 / pH: x  Gluc: x / Ketone: Negative  / Bili: Negative / Urobili: Negative   Blood: x / Protein: Negative / Nitrite: Positive   Leuk Esterase: Negative / RBC: 0-4 /HPF / WBC 3-5 /HPF   Sq Epi: x / Non Sq Epi: Neg.-Few / Bacteria: Many /HPF      MICROBIOLOGY:  Urinalysis Basic - ( 04 Sep 2020 22:03 )    Color: Yellow / Appearance: Clear / S.010 / pH: x  Gluc: x / Ketone: Negative  / Bili: Negative / Urobili: Negative   Blood: x / Protein: Negative / Nitrite: Positive   Leuk Esterase: Negative / RBC: 0-4 /HPF / WBC 3-5 /HPF   Sq Epi: x / Non Sq Epi: Neg.-Few / Bacteria: Many /HPF    RADIOLOGY & ADDITIONAL TESTS:    Imaging Personally Reviewed:    [X] Consultant(s) Notes Reviewed:  [X] Care Discussed with Consultants/Other Providers:

## 2020-09-05 NOTE — PROGRESS NOTE ADULT - SUBJECTIVE AND OBJECTIVE BOX
Chief complaint: better energy after PRBC transfusion, no new complaints     Patient is a 79y old  Male who presents with a chief complaint of 1.2 s/p right sided CVA with left sided weakness (05 Sep 2020 15:16)      PAST MEDICAL & SURGICAL HISTORY:  Myositis  Hyperlipidemia  Hypertension  GI bleed  Diverticulitis  No significant past surgical history      VITALS  Vital Signs Last 24 Hrs  T(C): 37.2 (05 Sep 2020 08:20), Max: 38.3 (04 Sep 2020 22:20)  T(F): 98.9 (05 Sep 2020 08:20), Max: 100.9 (04 Sep 2020 22:20)  HR: 60 (05 Sep 2020 08:20) (60 - 72)  BP: 125/75 (05 Sep 2020 08:20) (124/69 - 145/57)  BP(mean): --  RR: 16 (05 Sep 2020 08:20) (14 - 16)  SpO2: 98% (05 Sep 2020 08:20) (97% - 98%)      PHYSICAL EXAM  Constitutional - NAD, Comfortable  HEENT - NCAT, EOMI  Neck - Supple, No limited ROM  Chest - CTA bilaterally  Cardiovascular - RRR, S1S2  Abdomen - BS+, Soft, NTND  Extremities -No calf tenderness   Neurologic Exam -                    Cognitive - Awake, Alert, AAO X3     No new focal deficits                      RECENT LABS                        9.9    x     )-----------( x        ( 05 Sep 2020 10:08 )             28.0     09-05    138  |  104  |  46<H>  ----------------------------<  89  4.3   |  23  |  2.00<H>    Ca    9.6      05 Sep 2020 07:30  Phos  2.9     09-04    TPro  5.7<L>  /  Alb  x   /  TBili  x   /  DBili  x   /  AST  x   /  ALT  x   /  AlkPhos  x   09-05      Urinalysis Basic - ( 04 Sep 2020 22:03 )    Color: Yellow / Appearance: Clear / S.010 / pH: x  Gluc: x / Ketone: Negative  / Bili: Negative / Urobili: Negative   Blood: x / Protein: Negative / Nitrite: Positive   Leuk Esterase: Negative / RBC: 0-4 /HPF / WBC 3-5 /HPF   Sq Epi: x / Non Sq Epi: Neg.-Few / Bacteria: Many /HPF          CURRENT MEDICATIONS    MEDICATIONS  (STANDING):  allopurinol 100 milliGRAM(s) Oral daily  aspirin enteric coated 81 milliGRAM(s) Oral daily  atorvastatin 40 milliGRAM(s) Oral at bedtime  ferrous    sulfate 325 milliGRAM(s) Oral daily  folic acid 1 milliGRAM(s) Oral daily  gabapentin 100 milliGRAM(s) Oral three times a day  influenza   Vaccine 0.5 milliLiter(s) IntraMuscular once  lidocaine   Patch 1 Patch Transdermal daily  metoprolol tartrate 12.5 milliGRAM(s) Oral two times a day  pantoprazole    Tablet 40 milliGRAM(s) Oral before breakfast  rivaroxaban 15 milliGRAM(s) Oral with dinner  senna 2 Tablet(s) Oral at bedtime  thiamine 100 milliGRAM(s) Oral daily    MEDICATIONS  (PRN):  acetaminophen   Tablet .. 650 milliGRAM(s) Oral every 6 hours PRN Temp greater or equal to 38C (100.4F), Mild Pain (1 - 3)  polyethylene glycol 3350 17 Gram(s) Oral daily PRN Constipation    ASSESSMENT & PLAN          GI/Bowel Management - Dulcolax PRN, Fleet PRN   Management - Toilet Q2  Skin - Turn Q2  Pain - Tylenol PRN  DVT PPX - TEDs    Improved H/H after transfusion, will keep Hb>8, iron started  Stroke ppx is in place  Full program   Hematology and Medicine input appreciated       Continue comprehensive acute rehab program consisting of 3hrs/day of OT/PT and SLP.

## 2020-09-06 LAB
CULTURE RESULTS: SIGNIFICANT CHANGE UP
FOLATE RBC-MCNC: 1764 NG/ML — HIGH (ref 499–1504)
SPECIMEN SOURCE: SIGNIFICANT CHANGE UP

## 2020-09-06 PROCEDURE — 99232 SBSQ HOSP IP/OBS MODERATE 35: CPT

## 2020-09-06 RX ADMIN — Medication 650 MILLIGRAM(S): at 05:34

## 2020-09-06 RX ADMIN — GABAPENTIN 100 MILLIGRAM(S): 400 CAPSULE ORAL at 05:29

## 2020-09-06 RX ADMIN — SENNA PLUS 2 TABLET(S): 8.6 TABLET ORAL at 21:55

## 2020-09-06 RX ADMIN — Medication 325 MILLIGRAM(S): at 12:20

## 2020-09-06 RX ADMIN — Medication 100 MILLIGRAM(S): at 12:21

## 2020-09-06 RX ADMIN — Medication 12.5 MILLIGRAM(S): at 05:29

## 2020-09-06 RX ADMIN — PANTOPRAZOLE SODIUM 40 MILLIGRAM(S): 20 TABLET, DELAYED RELEASE ORAL at 06:22

## 2020-09-06 RX ADMIN — Medication 650 MILLIGRAM(S): at 12:21

## 2020-09-06 RX ADMIN — Medication 650 MILLIGRAM(S): at 06:29

## 2020-09-06 RX ADMIN — Medication 100 MILLIGRAM(S): at 12:24

## 2020-09-06 RX ADMIN — Medication 1 MILLIGRAM(S): at 12:23

## 2020-09-06 RX ADMIN — Medication 100 MILLIGRAM(S): at 21:56

## 2020-09-06 RX ADMIN — ATORVASTATIN CALCIUM 40 MILLIGRAM(S): 80 TABLET, FILM COATED ORAL at 21:55

## 2020-09-06 RX ADMIN — Medication 81 MILLIGRAM(S): at 12:21

## 2020-09-06 RX ADMIN — POLYETHYLENE GLYCOL 3350 17 GRAM(S): 17 POWDER, FOR SOLUTION ORAL at 12:23

## 2020-09-06 RX ADMIN — Medication 650 MILLIGRAM(S): at 20:15

## 2020-09-06 RX ADMIN — Medication 650 MILLIGRAM(S): at 21:00

## 2020-09-06 RX ADMIN — LIDOCAINE 1 PATCH: 4 CREAM TOPICAL at 18:18

## 2020-09-06 RX ADMIN — RIVAROXABAN 15 MILLIGRAM(S): KIT at 17:59

## 2020-09-06 RX ADMIN — Medication 12.5 MILLIGRAM(S): at 18:00

## 2020-09-06 RX ADMIN — LIDOCAINE 1 PATCH: 4 CREAM TOPICAL at 12:24

## 2020-09-06 RX ADMIN — Medication 650 MILLIGRAM(S): at 13:20

## 2020-09-06 RX ADMIN — GABAPENTIN 100 MILLIGRAM(S): 400 CAPSULE ORAL at 13:55

## 2020-09-06 RX ADMIN — GABAPENTIN 100 MILLIGRAM(S): 400 CAPSULE ORAL at 21:55

## 2020-09-06 RX ADMIN — LIDOCAINE 1 PATCH: 4 CREAM TOPICAL at 01:00

## 2020-09-06 NOTE — PROVIDER CONTACT NOTE (OTHER) - ASSESSMENT
Pt A&O x 4, able to make needs known. Pt with complaints of cough and phlegm. Lung sounds clear bilaterally. Vital signs as per documentation.

## 2020-09-06 NOTE — PROGRESS NOTE ADULT - ASSESSMENT
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    Gait Instability, ADL impairments and Functional impairments: Comprehensive Rehab Program of PT/OT/SLP     # left hemiparesis suspect from subcortical CVA  -CT head: negative acute infarct  -unable to obtain MRI due to PPM not compatible  -Xarelto, ASA and statin    # Near syncope suspect from orthostatic hypotension on 9/5  - patient got 1 PRBC and 1L IVF on 9/5  - fells better today; will recheck orthostatic vitals  - reduce metoprolol to 12.5mg BID for now, monitor BP and symptoms closely    # Macrocytic anemia  -s/p 1 PRBC on 9/5; Hgb responded well; Hgb 9.9  - suspect due to ETOH and chronic renal disease  - per pt, his hematologist outpt prescribed iron supplement. will order iron  - per pt, his FOBT was positive outpatient  - check occult stool; no signs of active bleeding  - transfuse if Hb< 8 ( per hematology)  - follow up GI outpatient for colonoscopy    # Thrombocytopenia  - hematology on board  -chronic ETOH abuse vs MDS  -further management/work up per HemOnc    # ETOH dependence   - thiamine and folic acid    # ACOM aneurysm  - f/u with Dr. Douglas, neurosurgery as outpatient    # CAD s/p stents  - cont ASA, statin, metoprolol.   - metoprolol reduced to 12.5mg BID as above    # A fib   - cont Metoprolol and xarelto  - currently in NSR    # HTN  - metoprolol    # Neck pain/ Cervical spine stenosis   - Neurontin 100mg TID  - Tylenol PRN, oxycodone PRN  - Neurosurgical follow as outpatient

## 2020-09-06 NOTE — PROGRESS NOTE ADULT - SUBJECTIVE AND OBJECTIVE BOX
Chief complaint: no new complaints, better energy     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 (05 Sep 2020 15:30)    PAST MEDICAL & SURGICAL HISTORY:  Myositis  Hyperlipidemia  Hypertension  GI bleed  Diverticulitis  No significant past surgical history      VITALS  Vital Signs Last 24 Hrs  T(C): 36.8 (06 Sep 2020 12:17), Max: 37.4 (06 Sep 2020 05:28)  T(F): 98.3 (06 Sep 2020 12:17), Max: 99.4 (06 Sep 2020 05:28)  HR: 69 (06 Sep 2020 12:17) (69 - 93)  BP: 144/86 (06 Sep 2020 12:17) (138/77 - 156/92)  BP(mean): --  RR: 16 (06 Sep 2020 12:17) (16 - 17)  SpO2: 100% (06 Sep 2020 12:17) (96% - 100%)      PHYSICAL EXAM  Constitutional - NAD, Comfortable  HEENT - NCAT, EOMI  Neck - Supple, No limited ROM  Chest - CTA bilaterally  Cardiovascular -  S1S2  Abdomen - BS+, Soft, NTND  Extremities -No calf tenderness   Neurologic Exam -                    Cognitive - Awake, Alert, AAO X3     No new focal deficits            RECENT LABS                        9.9    x     )-----------( x        ( 05 Sep 2020 10:08 )             28.0     09-05    138  |  104  |  46<H>  ----------------------------<  89  4.3   |  23  |  2.00<H>    Ca    9.6      05 Sep 2020 07:30    TPro  5.7<L>  /  Alb  x   /  TBili  x   /  DBili  x   /  AST  x   /  ALT  x   /  AlkPhos  x   09-05      Urinalysis Basic - ( 04 Sep 2020 22:03 )    Color: Yellow / Appearance: Clear / S.010 / pH: x  Gluc: x / Ketone: Negative  / Bili: Negative / Urobili: Negative   Blood: x / Protein: Negative / Nitrite: Positive   Leuk Esterase: Negative / RBC: 0-4 /HPF / WBC 3-5 /HPF   Sq Epi: x / Non Sq Epi: Neg.-Few / Bacteria: Many /HPF        CURRENT MEDICATIONS    MEDICATIONS  (STANDING):  allopurinol 100 milliGRAM(s) Oral daily  aspirin enteric coated 81 milliGRAM(s) Oral daily  atorvastatin 40 milliGRAM(s) Oral at bedtime  ferrous    sulfate 325 milliGRAM(s) Oral daily  folic acid 1 milliGRAM(s) Oral daily  gabapentin 100 milliGRAM(s) Oral three times a day  influenza   Vaccine 0.5 milliLiter(s) IntraMuscular once  lidocaine   Patch 1 Patch Transdermal daily  metoprolol tartrate 12.5 milliGRAM(s) Oral two times a day  pantoprazole    Tablet 40 milliGRAM(s) Oral before breakfast  rivaroxaban 15 milliGRAM(s) Oral with dinner  senna 2 Tablet(s) Oral at bedtime  thiamine 100 milliGRAM(s) Oral daily    MEDICATIONS  (PRN):  acetaminophen   Tablet .. 650 milliGRAM(s) Oral every 6 hours PRN Temp greater or equal to 38C (100.4F), Mild Pain (1 - 3)  polyethylene glycol 3350 17 Gram(s) Oral daily PRN Constipation    ASSESSMENT & PLAN          GI/Bowel Management - Dulcolax PRN, Fleet PRN   Management - Toilet Q2  Skin - Turn Q2  Pain - Tylenol PRN  DVT PPX - Xarelto       Continue comprehensive acute rehab program consisting of 3hrs/day of OT/PT and SLP.

## 2020-09-06 NOTE — PROGRESS NOTE ADULT - SUBJECTIVE AND OBJECTIVE BOX
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 (06 Sep 2020 12:29)      Patient seen and examined at bedside.  no dizziness today  no chest pain or SOB  mild L neck pain x3 weeks    ALLERGIES:  No Known Allergies    MEDICATIONS  (STANDING):  allopurinol 100 milliGRAM(s) Oral daily  aspirin enteric coated 81 milliGRAM(s) Oral daily  atorvastatin 40 milliGRAM(s) Oral at bedtime  ferrous    sulfate 325 milliGRAM(s) Oral daily  folic acid 1 milliGRAM(s) Oral daily  gabapentin 100 milliGRAM(s) Oral three times a day  influenza   Vaccine 0.5 milliLiter(s) IntraMuscular once  lidocaine   Patch 1 Patch Transdermal daily  metoprolol tartrate 12.5 milliGRAM(s) Oral two times a day  pantoprazole    Tablet 40 milliGRAM(s) Oral before breakfast  rivaroxaban 15 milliGRAM(s) Oral with dinner  senna 2 Tablet(s) Oral at bedtime  thiamine 100 milliGRAM(s) Oral daily    MEDICATIONS  (PRN):  acetaminophen   Tablet .. 650 milliGRAM(s) Oral every 6 hours PRN Temp greater or equal to 38C (100.4F), Mild Pain (1 - 3)  polyethylene glycol 3350 17 Gram(s) Oral daily PRN Constipation    Vital Signs Last 24 Hrs  T(F): 98.3 (06 Sep 2020 12:17), Max: 99.4 (06 Sep 2020 05:28)  HR: 69 (06 Sep 2020 12:17) (69 - 93)  BP: 144/86 (06 Sep 2020 12:17) (138/77 - 156/92)  RR: 16 (06 Sep 2020 12:17) (16 - 17)  SpO2: 100% (06 Sep 2020 12:17) (96% - 100%)  I&O's Summary    05 Sep 2020 07:01  -  06 Sep 2020 07:00  --------------------------------------------------------  IN: 0 mL / OUT: 650 mL / NET: -650 mL      BMI (kg/m2): 16.3 (20 @ 20:50)  PHYSICAL EXAM:  General: NAD, A/O x 3  ENT: MMM  Neck: Supple, No JVD  Lungs: Clear to auscultation bilaterally  Cardio: RRR, S1/S2, No murmurs  Abdomen: Soft, Nontender, Nondistended; Bowel sounds present  Extremities: No calf tenderness, No pitting edema  neuro: AAOx3; no new focal deficit noted     LABS:                        9.9    x     )-----------( x        ( 05 Sep 2020 10:08 )             28.0           138  |  104  |  46  ----------------------------<  89  4.3   |  23  |  2.00    Ca    9.6      05 Sep 2020 07:30  Phos  2.9         TPro  5.7  /  Alb  x   /  TBili  x   /  DBili  x   /  AST  x   /  ALT  x   /  AlkPhos  x        eGFR if Non African American: 31 mL/min/1.73M2 (20 @ 07:30)  eGFR if : 36 mL/min/1.73M2 (20 @ 07:30)         CARDIAC MARKERS ( 04 Sep 2020 05:00 )  <.017 ng/mL / x     / x     / x     / x          08-31 Chol 152 mg/dL LDL 85 mg/dL HDL 48 mg/dL Trig 156 mg/dL                  Urinalysis Basic - ( 04 Sep 2020 22:03 )    Color: Yellow / Appearance: Clear / S.010 / pH: x  Gluc: x / Ketone: Negative  / Bili: Negative / Urobili: Negative   Blood: x / Protein: Negative / Nitrite: Positive   Leuk Esterase: Negative / RBC: 0-4 /HPF / WBC 3-5 /HPF   Sq Epi: x / Non Sq Epi: Neg.-Few / Bacteria: Many /HPF          RADIOLOGY & ADDITIONAL TESTS:    Care Discussed with Consultants/Other Providers:

## 2020-09-07 DIAGNOSIS — D69.6 THROMBOCYTOPENIA, UNSPECIFIED: ICD-10-CM

## 2020-09-07 DIAGNOSIS — D64.9 ANEMIA, UNSPECIFIED: ICD-10-CM

## 2020-09-07 LAB
% ALBUMIN: 54 % — SIGNIFICANT CHANGE UP
% ALPHA 1: 7.8 % — SIGNIFICANT CHANGE UP
% ALPHA 2: 12.3 % — SIGNIFICANT CHANGE UP
% BETA: 12.2 % — SIGNIFICANT CHANGE UP
% GAMMA: 13.7 % — SIGNIFICANT CHANGE UP
ALBUMIN SERPL ELPH-MCNC: 3.1 G/DL — LOW (ref 3.6–5.5)
ALBUMIN SERPL ELPH-MCNC: 3.2 G/DL — LOW (ref 3.3–5)
ALBUMIN/GLOB SERPL ELPH: 1.2 RATIO — SIGNIFICANT CHANGE UP
ALP SERPL-CCNC: 66 U/L — SIGNIFICANT CHANGE UP (ref 40–120)
ALPHA1 GLOB SERPL ELPH-MCNC: 0.4 G/DL — SIGNIFICANT CHANGE UP (ref 0.1–0.4)
ALPHA2 GLOB SERPL ELPH-MCNC: 0.7 G/DL — SIGNIFICANT CHANGE UP (ref 0.5–1)
ALT FLD-CCNC: 31 U/L — SIGNIFICANT CHANGE UP (ref 10–45)
ANION GAP SERPL CALC-SCNC: 9 MMOL/L — SIGNIFICANT CHANGE UP (ref 5–17)
AST SERPL-CCNC: 20 U/L — SIGNIFICANT CHANGE UP (ref 10–40)
B-GLOBULIN SERPL ELPH-MCNC: 0.7 G/DL — SIGNIFICANT CHANGE UP (ref 0.5–1)
BILIRUB SERPL-MCNC: 0.5 MG/DL — SIGNIFICANT CHANGE UP (ref 0.2–1.2)
BUN SERPL-MCNC: 44 MG/DL — HIGH (ref 7–23)
CALCIUM SERPL-MCNC: 10.1 MG/DL — SIGNIFICANT CHANGE UP (ref 8.4–10.5)
CHLORIDE SERPL-SCNC: 103 MMOL/L — SIGNIFICANT CHANGE UP (ref 96–108)
CO2 SERPL-SCNC: 27 MMOL/L — SIGNIFICANT CHANGE UP (ref 22–31)
CREAT SERPL-MCNC: 1.9 MG/DL — HIGH (ref 0.5–1.3)
GAMMA GLOBULIN: 0.8 G/DL — SIGNIFICANT CHANGE UP (ref 0.6–1.6)
GLUCOSE BLDC GLUCOMTR-MCNC: 147 MG/DL — HIGH (ref 70–99)
GLUCOSE SERPL-MCNC: 132 MG/DL — HIGH (ref 70–99)
HCT VFR BLD CALC: 27.6 % — LOW (ref 39–50)
HGB BLD-MCNC: 9.7 G/DL — LOW (ref 13–17)
MCHC RBC-ENTMCNC: 34.2 PG — HIGH (ref 27–34)
MCHC RBC-ENTMCNC: 35.1 GM/DL — SIGNIFICANT CHANGE UP (ref 32–36)
MCV RBC AUTO: 97.2 FL — SIGNIFICANT CHANGE UP (ref 80–100)
NRBC # BLD: 0 /100 WBCS — SIGNIFICANT CHANGE UP (ref 0–0)
PLATELET # BLD AUTO: 186 K/UL — SIGNIFICANT CHANGE UP (ref 150–400)
POTASSIUM SERPL-MCNC: 4.4 MMOL/L — SIGNIFICANT CHANGE UP (ref 3.5–5.3)
POTASSIUM SERPL-SCNC: 4.4 MMOL/L — SIGNIFICANT CHANGE UP (ref 3.5–5.3)
PROT PATTERN SERPL ELPH-IMP: SIGNIFICANT CHANGE UP
PROT SERPL-MCNC: 7 G/DL — SIGNIFICANT CHANGE UP (ref 6–8.3)
RBC # BLD: 2.84 M/UL — LOW (ref 4.2–5.8)
RBC # FLD: 16.7 % — HIGH (ref 10.3–14.5)
SODIUM SERPL-SCNC: 139 MMOL/L — SIGNIFICANT CHANGE UP (ref 135–145)
TROPONIN I SERPL-MCNC: <.017 NG/ML — LOW (ref 0.02–0.06)
WBC # BLD: 7.17 K/UL — SIGNIFICANT CHANGE UP (ref 3.8–10.5)
WBC # FLD AUTO: 7.17 K/UL — SIGNIFICANT CHANGE UP (ref 3.8–10.5)

## 2020-09-07 PROCEDURE — 99232 SBSQ HOSP IP/OBS MODERATE 35: CPT

## 2020-09-07 PROCEDURE — 99233 SBSQ HOSP IP/OBS HIGH 50: CPT

## 2020-09-07 PROCEDURE — 93010 ELECTROCARDIOGRAM REPORT: CPT

## 2020-09-07 RX ORDER — METOPROLOL TARTRATE 50 MG
12.5 TABLET ORAL
Refills: 0 | Status: DISCONTINUED | OUTPATIENT
Start: 2020-09-07 | End: 2020-09-24

## 2020-09-07 RX ORDER — IBUPROFEN 200 MG
600 TABLET ORAL ONCE
Refills: 0 | Status: DISCONTINUED | OUTPATIENT
Start: 2020-09-07 | End: 2020-09-08

## 2020-09-07 RX ORDER — SODIUM CHLORIDE 9 MG/ML
500 INJECTION INTRAMUSCULAR; INTRAVENOUS; SUBCUTANEOUS ONCE
Refills: 0 | Status: COMPLETED | OUTPATIENT
Start: 2020-09-07 | End: 2020-09-07

## 2020-09-07 RX ORDER — LIDOCAINE 4 G/100G
1 CREAM TOPICAL EVERY 24 HOURS
Refills: 0 | Status: DISCONTINUED | OUTPATIENT
Start: 2020-09-07 | End: 2020-09-07

## 2020-09-07 RX ADMIN — GABAPENTIN 100 MILLIGRAM(S): 400 CAPSULE ORAL at 13:32

## 2020-09-07 RX ADMIN — Medication 12.5 MILLIGRAM(S): at 17:47

## 2020-09-07 RX ADMIN — LIDOCAINE 1 PATCH: 4 CREAM TOPICAL at 00:05

## 2020-09-07 RX ADMIN — Medication 100 MILLIGRAM(S): at 12:24

## 2020-09-07 RX ADMIN — LIDOCAINE 1 PATCH: 4 CREAM TOPICAL at 19:21

## 2020-09-07 RX ADMIN — SODIUM CHLORIDE 500 MILLILITER(S): 9 INJECTION INTRAMUSCULAR; INTRAVENOUS; SUBCUTANEOUS at 13:50

## 2020-09-07 RX ADMIN — Medication 325 MILLIGRAM(S): at 12:24

## 2020-09-07 RX ADMIN — GABAPENTIN 100 MILLIGRAM(S): 400 CAPSULE ORAL at 06:20

## 2020-09-07 RX ADMIN — RIVAROXABAN 15 MILLIGRAM(S): KIT at 17:47

## 2020-09-07 RX ADMIN — Medication 1 MILLIGRAM(S): at 12:24

## 2020-09-07 RX ADMIN — LIDOCAINE 1 PATCH: 4 CREAM TOPICAL at 12:24

## 2020-09-07 RX ADMIN — Medication 12.5 MILLIGRAM(S): at 06:20

## 2020-09-07 RX ADMIN — PANTOPRAZOLE SODIUM 40 MILLIGRAM(S): 20 TABLET, DELAYED RELEASE ORAL at 06:20

## 2020-09-07 RX ADMIN — Medication 100 MILLIGRAM(S): at 12:25

## 2020-09-07 RX ADMIN — Medication 650 MILLIGRAM(S): at 21:37

## 2020-09-07 RX ADMIN — SENNA PLUS 2 TABLET(S): 8.6 TABLET ORAL at 21:37

## 2020-09-07 RX ADMIN — GABAPENTIN 100 MILLIGRAM(S): 400 CAPSULE ORAL at 21:37

## 2020-09-07 RX ADMIN — POLYETHYLENE GLYCOL 3350 17 GRAM(S): 17 POWDER, FOR SOLUTION ORAL at 12:25

## 2020-09-07 RX ADMIN — Medication 650 MILLIGRAM(S): at 22:25

## 2020-09-07 RX ADMIN — Medication 81 MILLIGRAM(S): at 12:24

## 2020-09-07 RX ADMIN — Medication 650 MILLIGRAM(S): at 07:14

## 2020-09-07 RX ADMIN — Medication 650 MILLIGRAM(S): at 06:20

## 2020-09-07 RX ADMIN — ATORVASTATIN CALCIUM 40 MILLIGRAM(S): 80 TABLET, FILM COATED ORAL at 21:37

## 2020-09-07 RX ADMIN — SODIUM CHLORIDE 500 MILLILITER(S): 9 INJECTION INTRAMUSCULAR; INTRAVENOUS; SUBCUTANEOUS at 14:15

## 2020-09-07 NOTE — RAPID RESPONSE TEAM SUMMARY - NSSITUATIONBACKGROUNDRRT_GEN_ALL_CORE
patient was about to finish PT session (had finished about 1 hour) when he felt dizzy while walking; he was slowly placed on the bed with legs up; Physical therapist thought his mental status was fully back to baseline within 30 seconds; no obvious seizure like activity;   when I arrived patient was awake and AAox3; no new focal deficit noted; denies chest pain or SOB or palpitation; no calf tenderness or pain; he was ordered NS bolus ( ~1L); EKG, CBC, BMP, cardiac enzymes ordered  he had a rapid response few days ago thought to be vasovagal and was given PRBC and IVF with good response; patient was about to finish PT session (had finished about 1 hour) when he felt dizzy while walking; he was slowly placed on the bed with legs up; Physical therapist thought his mental status was fully back to baseline within 30 seconds; no obvious seizure like activity;   /67 HR 81bpm  when I arrived patient was awake and AAox3; no new focal deficit noted; denies chest pain or SOB or palpitation; no calf tenderness or pain; he was ordered NS bolus ( ~1L); EKG, CBC, BMP, cardiac enzymes ordered  he had a rapid response few days ago thought to be vasovagal and was given PRBC and IVF with good response;repeat /86; HR 77; 100% saturation on RA

## 2020-09-07 NOTE — PROGRESS NOTE ADULT - SUBJECTIVE AND OBJECTIVE BOX
Chief complaint: mild intermittent neck pain without radicular pattern     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 (07 Sep 2020 10:44)        HEALTH ISSUES - PROBLEM Dx:  Thrombocytopenia: Thrombocytopenia  Anemia, unspecified type: Anemia, unspecified type              PAST MEDICAL & SURGICAL HISTORY:  Myositis  Hyperlipidemia  Hypertension  GI bleed  Diverticulitis  No significant past surgical history    VITALS  Vital Signs Last 24 Hrs  T(C): 36.8 (07 Sep 2020 09:13), Max: 37 (06 Sep 2020 20:22)  T(F): 98.3 (07 Sep 2020 09:13), Max: 98.6 (06 Sep 2020 20:22)  HR: 80 (07 Sep 2020 09:13) (68 - 90)  BP: 110/79 (07 Sep 2020 09:13) (110/79 - 156/86)  BP(mean): --  RR: 14 (07 Sep 2020 09:13) (14 - 16)  SpO2: 100% (07 Sep 2020 09:13) (97% - 100%)      PHYSICAL EXAM  Constitutional - NAD, Comfortable  HEENT - NCAT, EOMI  Neck - Supple, No limited ROM  Chest - CTA bilaterally  Cardiovascular - RRR, S1S2, No   Abdomen - BS+, Soft, NTND  Extremities - No calf tenderness   Neurologic Exam -                    Cognitive - Awake, Alert, AAO X3     No new focal deficits                    CURRENT MEDICATIONS    MEDICATIONS  (STANDING):  allopurinol 100 milliGRAM(s) Oral daily  aspirin enteric coated 81 milliGRAM(s) Oral daily  atorvastatin 40 milliGRAM(s) Oral at bedtime  ferrous    sulfate 325 milliGRAM(s) Oral daily  folic acid 1 milliGRAM(s) Oral daily  gabapentin 100 milliGRAM(s) Oral three times a day  influenza   Vaccine 0.5 milliLiter(s) IntraMuscular once  lidocaine   Patch 1 Patch Transdermal daily  lidocaine   Patch 1 Patch Transdermal every 24 hours  metoprolol tartrate 12.5 milliGRAM(s) Oral two times a day  pantoprazole    Tablet 40 milliGRAM(s) Oral before breakfast  rivaroxaban 15 milliGRAM(s) Oral with dinner  senna 2 Tablet(s) Oral at bedtime  thiamine 100 milliGRAM(s) Oral daily    MEDICATIONS  (PRN):  acetaminophen   Tablet .. 650 milliGRAM(s) Oral every 6 hours PRN Temp greater or equal to 38C (100.4F), Mild Pain (1 - 3)  guaiFENesin   Syrup  (Sugar-Free) 100 milliGRAM(s) Oral every 6 hours PRN Cough  polyethylene glycol 3350 17 Gram(s) Oral daily PRN Constipation    ASSESSMENT & PLAN          GI/Bowel Management - Dulcolax PRN, Fleet PRN   Management - Toilet Q2  Skin - Turn Q2  Pain - Tylenol PRN  DVT PPX - Xarelto  Lidoderm patch for neck pain       Continue comprehensive acute rehab program consisting of 3hrs/day of OT/PT and SLP.

## 2020-09-07 NOTE — PROGRESS NOTE ADULT - ASSESSMENT
80yo Male with pmhx of CAD s/p stent x3, CKD, A fib on Xarelto, HTN, s/p [employer] PPM, myositis with possible right sided subcortical CVA admitted to BIU.    #Gait Instability, ADL impairments and Functional impairments  -c/w comprehensive Rehab Program of PT/OT/SLP     # pre-syncope: rapid response on  9/7  -this was likely vasovagal; unlikely to be new stroke/ seizure  -getting 1L NS  -CBC/ BMP/ EKG/ trop  -had similar episodes a few days ago that was treated with PRBC and IVF with good response     # left hemiparesis suspect from subcortical CVA  -CT head: negative acute infarct  -unable to obtain MRI due to PPM not compatible  -Xarelto, ASA and statin    # Macrocytic anemia  -s/p 1 PRBC on 9/5; Hgb responded well; Hgb 9.9  - suspect due to ETOH and chronic renal disease  - per pt, his hematologist outpt prescribed iron supplement. will order iron  - per pt, his FOBT was positive outpatient  - check occult stool; no signs of active bleeding  - transfuse if Hb< 8 ( per hematology)  - follow up GI outpatient for colonoscopy    # Thrombocytopenia  - hematology on board  -chronic ETOH abuse vs MDS  -further management/work up per HemOnc    # ETOH dependence   - thiamine and folic acid    # ACOM aneurysm  - f/u with Dr. Douglas, neurosurgery as outpatient    # CAD s/p stents  - cont ASA, statin, metoprolol.   - metoprolol reduced to 12.5mg BID as above with holding parameters     # A fib   - cont Metoprolol and xarelto  - currently in NSR    # HTN  - metoprolol    # Neck pain/ Cervical spine stenosis   - Neurontin 100mg TID  - Tylenol PRN, oxycodone PRN  - Neurosurgical follow as outpatient

## 2020-09-07 NOTE — PROGRESS NOTE ADULT - SUBJECTIVE AND OBJECTIVE BOX
EVERARDO FIGUEROA   79y   Male    Admitting: MANUEL Llanes  HPI:  79-year-old gentleman with history of hypertension, coronary artery disease, chronic kidney disease, atrial fibrillation on Xarelto, who presented to the emergency department 8/30/20 with left-sided weakness and neck pain. 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  Patient is now on the acute rehabilitation unit in [facility].  Hematology consulted for anemia/thrombocytopenia. Of note, patient has been under the hematologic care of Dr. Uribe for his anemia. He also reports history of a bone marrow evaluation years ago (no known diagnosis given). He stated he has a history of intermittently guaiac-positive stools (most recently last month), and is under the gastrointestinal care of Dr. Olivo. Plans were for eventual followup endoscopic evaluation.    PAST MEDICAL & SURGICAL HISTORY:  Myositis  Hyperlipidemia  Hypertension  GI bleed  Diverticulitis    MEDICATIONS  (STANDING):  allopurinol 100 milliGRAM(s) Oral daily  aspirin enteric coated 81 milliGRAM(s) Oral daily  atorvastatin 40 milliGRAM(s) Oral at bedtime  ferrous    sulfate 325 milliGRAM(s) Oral daily  folic acid 1 milliGRAM(s) Oral daily  gabapentin 100 milliGRAM(s) Oral three times a day  influenza   Vaccine 0.5 milliLiter(s) IntraMuscular once  lidocaine   Patch 1 Patch Transdermal daily  metoprolol tartrate 12.5 milliGRAM(s) Oral two times a day  pantoprazole    Tablet 40 milliGRAM(s) Oral before breakfast  rivaroxaban 15 milliGRAM(s) Oral with dinner  senna 2 Tablet(s) Oral at bedtime  thiamine 100 milliGRAM(s) Oral daily    MEDICATIONS  (PRN):  acetaminophen   Tablet .. 650 milliGRAM(s) Oral every 6 hours PRN Temp greater or equal to 38C (100.4F), Mild Pain (1 - 3)  guaiFENesin   Syrup  (Sugar-Free) 100 milliGRAM(s) Oral every 6 hours PRN Cough  polyethylene glycol 3350 17 Gram(s) Oral daily PRN Constipation    Allergies    No Known Allergies    INTERVAL HPI/OVERNIGHT EVENTS:  Patient S&E at bedside. No c/o CP or SOB. No gross bleeding reported.    VITAL SIGNS:  T(F): 98.3 (09-07-20 @ 09:13)  HR: 80 (09-07-20 @ 09:13)  BP: 110/79 (09-07-20 @ 09:13)  RR: 14 (09-07-20 @ 09:13)  SpO2: 100% (09-07-20 @ 09:13)    PHYSICAL EXAM:  Constitutional: NAD  Eyes: conjunctivae pink  Neck: no JVD  Respiratory: CTA b/l, good air entry b/l ant.  Cardiovascular: RRR, no M/R/G  Gastrointestinal: soft, NTND, no masses palpable, no hepatosplenomegaly appreciated  Extremities: no calf tenderness  Neurological: Awake, alert.    Labs:             9.9    x     )-----------( x        ( 09-05 @ 10:08 )             28.0                8.9    5.33  )-----------( 127      ( 09-05 @ 07:30 )             25.7                7.5    4.89  )-----------( 136      ( 09-04 @ 13:30 )             21.2   Ferritin, Serum: 171 ng/mL (09-05-20 @ 07:30)  Absolute Reticulocytes: 43.0 K/uL (09-05-20 @ 07:30)  Immunoelectrophoresis, Serum (09.05.20 @ 07:30)    Protein Total, Serum: 5.7 g/dL    Total Protein, Serum: 5.7 g/dL    Serum Protein Electrophoresis Interp: Normal Electrophoresis Pattern

## 2020-09-07 NOTE — PROGRESS NOTE ADULT - ASSESSMENT
79-year-old gentleman with history of hypertension, coronary artery disease, chronic kidney disease, atrial fibrillation on Xarelto, who presented to the emergency department 8/30/20 with left-sided weakness and neck pain. 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  Patient is now on the acute rehabilitation unit in [facility].  Hematology consulted for anemia/thrombocytopenia. Of note, patient has been under the hematologic care of Dr. Uribe for his anemia. He also reports history of a bone marrow evaluation years ago (no known diagnosis given). He stated he has a history of intermittently guaiac-positive stools (most recently last month), and is under the gastrointestinal care of Dr. Olivo. Plans were for eventual followup endoscopic evaluation.

## 2020-09-07 NOTE — PROGRESS NOTE ADULT - SUBJECTIVE AND OBJECTIVE BOX
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 (07 Sep 2020 12:20)     patient was seen in the morning and again during rapid response due to pre-syncope while doing PT    "patient was about to finish PT session (had finished about 1 hour) when he felt dizzy while walking; he was slowly placed on the bed with legs up; Physical therapist thought his mental status was fully back to baseline within 30 seconds; no obvious seizure like activity; /67 HR 81bpm  when I arrived patient was awake and AAox3; no new focal deficit noted; denies chest pain or SOB or palpitation; no calf tenderness or pain; he was ordered NS bolus ( ~1L); EKG, CBC, BMP, cardiac enzymes ordered  he had a rapid response few days ago thought to be vasovagal and was given PRBC and IVF with good response;   repeat /86; HR 77; 100% saturation on RA; transferred to his room on a wheelchair           ALLERGIES:  No Known Allergies    MEDICATIONS  (STANDING):  allopurinol 100 milliGRAM(s) Oral daily  aspirin enteric coated 81 milliGRAM(s) Oral daily  atorvastatin 40 milliGRAM(s) Oral at bedtime  ferrous    sulfate 325 milliGRAM(s) Oral daily  folic acid 1 milliGRAM(s) Oral daily  gabapentin 100 milliGRAM(s) Oral three times a day  influenza   Vaccine 0.5 milliLiter(s) IntraMuscular once  lidocaine   Patch 1 Patch Transdermal daily  metoprolol tartrate 12.5 milliGRAM(s) Oral two times a day  pantoprazole    Tablet 40 milliGRAM(s) Oral before breakfast  rivaroxaban 15 milliGRAM(s) Oral with dinner  senna 2 Tablet(s) Oral at bedtime  thiamine 100 milliGRAM(s) Oral daily    MEDICATIONS  (PRN):  acetaminophen   Tablet .. 650 milliGRAM(s) Oral every 6 hours PRN Temp greater or equal to 38C (100.4F), Mild Pain (1 - 3)  guaiFENesin   Syrup  (Sugar-Free) 100 milliGRAM(s) Oral every 6 hours PRN Cough  polyethylene glycol 3350 17 Gram(s) Oral daily PRN Constipation    Vital Signs Last 24 Hrs  T(F): 98.3 (07 Sep 2020 09:13), Max: 98.6 (06 Sep 2020 20:22)  HR: 80 (07 Sep 2020 09:13) (68 - 84)  BP: 110/79 (07 Sep 2020 09:13) (110/79 - 156/86)  RR: 14 (07 Sep 2020 09:13) (14 - 16)  SpO2: 100% (07 Sep 2020 09:13) (97% - 100%)  I&O's Summary    06 Sep 2020 07:  -  07 Sep 2020 07:00  --------------------------------------------------------  IN: 540 mL / OUT: 350 mL / NET: 190 mL    07 Sep 2020 07:  -  07 Sep 2020 15:27  --------------------------------------------------------  IN: 0 mL / OUT: 150 mL / NET: -150 mL      BMI (kg/m2): 16.3 (20 @ 20:50)  PHYSICAL EXAM:  General: NAD  ENT: MMM  Neck: Supple, No JVD  Lungs: Clear to auscultation bilaterally  Cardio: RRR, S1/S2, No murmurs  Abdomen: Soft, Nontender, Nondistended; Bowel sounds present  Extremities: No calf tenderness, No pitting edema  neuro: AAox3 able to move all his extremities on his own     LABS:                        9.9    x     )-----------( x        ( 05 Sep 2020 10:08 )             28.0           138  |  104  |  46  ----------------------------<  89  4.3   |  23  |  2.00    Ca    9.6      05 Sep 2020 07:30    TPro  5.7  /  Alb  3.1  /  TBili  x   /  DBili  x   /  AST  x   /  ALT  x   /  AlkPhos  x        eGFR if Non African American: 31 mL/min/1.73M2 (20 @ 07:30)  eGFR if : 36 mL/min/1.73M2 (20 @ 07:30)             - Chol 152 mg/dL LDL 85 mg/dL HDL 48 mg/dL Trig 156 mg/dL              POCT Blood Glucose.: 147 mg/dL (07 Sep 2020 13:52)      Urinalysis Basic - ( 04 Sep 2020 22:03 )    Color: Yellow / Appearance: Clear / S.010 / pH: x  Gluc: x / Ketone: Negative  / Bili: Negative / Urobili: Negative   Blood: x / Protein: Negative / Nitrite: Positive   Leuk Esterase: Negative / RBC: 0-4 /HPF / WBC 3-5 /HPF   Sq Epi: x / Non Sq Epi: Neg.-Few / Bacteria: Many /HPF        Culture - Urine (collected 05 Sep 2020 00:10)  Source: .Urine Clean Catch (Midstream)  Final Report (06 Sep 2020 23:16):    >100,000 CFU/ml Coag Negative Staphylococcus "Susceptibilities not    performed"        RADIOLOGY & ADDITIONAL TESTS:    Care Discussed with Consultants/Other Providers:

## 2020-09-08 LAB
ALBUMIN SERPL ELPH-MCNC: 3.2 G/DL — LOW (ref 3.3–5)
ALP SERPL-CCNC: 65 U/L — SIGNIFICANT CHANGE UP (ref 40–120)
ALT FLD-CCNC: 27 U/L — SIGNIFICANT CHANGE UP (ref 10–45)
ANION GAP SERPL CALC-SCNC: 11 MMOL/L — SIGNIFICANT CHANGE UP (ref 5–17)
APPEARANCE UR: CLEAR — SIGNIFICANT CHANGE UP
AST SERPL-CCNC: 19 U/L — SIGNIFICANT CHANGE UP (ref 10–40)
BACTERIA # UR AUTO: ABNORMAL /HPF
BILIRUB SERPL-MCNC: 0.5 MG/DL — SIGNIFICANT CHANGE UP (ref 0.2–1.2)
BILIRUB UR-MCNC: NEGATIVE — SIGNIFICANT CHANGE UP
BUN SERPL-MCNC: 42 MG/DL — HIGH (ref 7–23)
CALCIUM SERPL-MCNC: 10.3 MG/DL — SIGNIFICANT CHANGE UP (ref 8.4–10.5)
CHLORIDE SERPL-SCNC: 102 MMOL/L — SIGNIFICANT CHANGE UP (ref 96–108)
CO2 SERPL-SCNC: 25 MMOL/L — SIGNIFICANT CHANGE UP (ref 22–31)
COLOR SPEC: YELLOW — SIGNIFICANT CHANGE UP
CREAT SERPL-MCNC: 1.85 MG/DL — HIGH (ref 0.5–1.3)
DIFF PNL FLD: NEGATIVE — SIGNIFICANT CHANGE UP
EPI CELLS # UR: SIGNIFICANT CHANGE UP
GLUCOSE SERPL-MCNC: 115 MG/DL — HIGH (ref 70–99)
GLUCOSE UR QL: NEGATIVE — SIGNIFICANT CHANGE UP
HCT VFR BLD CALC: 25.9 % — LOW (ref 39–50)
HGB BLD-MCNC: 9.2 G/DL — LOW (ref 13–17)
IGA FLD-MCNC: 105 MG/DL — SIGNIFICANT CHANGE UP (ref 84–499)
IGG FLD-MCNC: 734 MG/DL — SIGNIFICANT CHANGE UP (ref 610–1660)
IGM SERPL-MCNC: 49 MG/DL — SIGNIFICANT CHANGE UP (ref 35–242)
INTERPRETATION SERPL IFE-IMP: SIGNIFICANT CHANGE UP
KAPPA LC SER QL IFE: 4.21 MG/DL — HIGH (ref 0.33–1.94)
KAPPA LC SER QL IFE: 4.21 MG/DL — HIGH (ref 0.33–1.94)
KAPPA/LAMBDA FREE LIGHT CHAIN RATIO, SERUM: 1.84 RATIO — HIGH (ref 0.26–1.65)
KAPPA/LAMBDA FREE LIGHT CHAIN RATIO, SERUM: 1.84 RATIO — HIGH (ref 0.26–1.65)
KETONES UR-MCNC: NEGATIVE — SIGNIFICANT CHANGE UP
LAMBDA LC SER QL IFE: 2.29 MG/DL — SIGNIFICANT CHANGE UP (ref 0.57–2.63)
LAMBDA LC SER QL IFE: 2.29 MG/DL — SIGNIFICANT CHANGE UP (ref 0.57–2.63)
LEUKOCYTE ESTERASE UR-ACNC: ABNORMAL
MCHC RBC-ENTMCNC: 34.6 PG — HIGH (ref 27–34)
MCHC RBC-ENTMCNC: 35.5 GM/DL — SIGNIFICANT CHANGE UP (ref 32–36)
MCV RBC AUTO: 97.4 FL — SIGNIFICANT CHANGE UP (ref 80–100)
NITRITE UR-MCNC: POSITIVE
NRBC # BLD: 0 /100 WBCS — SIGNIFICANT CHANGE UP (ref 0–0)
OB PNL STL: POSITIVE
PH UR: 8 — SIGNIFICANT CHANGE UP (ref 5–8)
PLATELET # BLD AUTO: 195 K/UL — SIGNIFICANT CHANGE UP (ref 150–400)
POTASSIUM SERPL-MCNC: 4.3 MMOL/L — SIGNIFICANT CHANGE UP (ref 3.5–5.3)
POTASSIUM SERPL-SCNC: 4.3 MMOL/L — SIGNIFICANT CHANGE UP (ref 3.5–5.3)
PROT SERPL-MCNC: 6.8 G/DL — SIGNIFICANT CHANGE UP (ref 6–8.3)
PROT UR-MCNC: 15
RBC # BLD: 2.66 M/UL — LOW (ref 4.2–5.8)
RBC # FLD: 15.9 % — HIGH (ref 10.3–14.5)
RBC CASTS # UR COMP ASSIST: SIGNIFICANT CHANGE UP /HPF (ref 0–4)
SODIUM SERPL-SCNC: 138 MMOL/L — SIGNIFICANT CHANGE UP (ref 135–145)
SP GR SPEC: 1.01 — SIGNIFICANT CHANGE UP (ref 1.01–1.02)
UROBILINOGEN FLD QL: NEGATIVE — SIGNIFICANT CHANGE UP
WBC # BLD: 6.02 K/UL — SIGNIFICANT CHANGE UP (ref 3.8–10.5)
WBC # FLD AUTO: 6.02 K/UL — SIGNIFICANT CHANGE UP (ref 3.8–10.5)
WBC UR QL: SIGNIFICANT CHANGE UP /HPF (ref 0–5)

## 2020-09-08 PROCEDURE — 99222 1ST HOSP IP/OBS MODERATE 55: CPT

## 2020-09-08 PROCEDURE — 99233 SBSQ HOSP IP/OBS HIGH 50: CPT

## 2020-09-08 PROCEDURE — 72125 CT NECK SPINE W/O DYE: CPT | Mod: 26

## 2020-09-08 PROCEDURE — 99232 SBSQ HOSP IP/OBS MODERATE 35: CPT

## 2020-09-08 RX ORDER — GABAPENTIN 400 MG/1
200 CAPSULE ORAL THREE TIMES A DAY
Refills: 0 | Status: DISCONTINUED | OUTPATIENT
Start: 2020-09-08 | End: 2020-09-10

## 2020-09-08 RX ORDER — OXYCODONE HYDROCHLORIDE 5 MG/1
5 TABLET ORAL EVERY 8 HOURS
Refills: 0 | Status: DISCONTINUED | OUTPATIENT
Start: 2020-09-08 | End: 2020-09-15

## 2020-09-08 RX ORDER — POLYETHYLENE GLYCOL 3350 17 G/17G
17 POWDER, FOR SOLUTION ORAL DAILY
Refills: 0 | Status: DISCONTINUED | OUTPATIENT
Start: 2020-09-08 | End: 2020-09-10

## 2020-09-08 RX ADMIN — SENNA PLUS 2 TABLET(S): 8.6 TABLET ORAL at 21:27

## 2020-09-08 RX ADMIN — RIVAROXABAN 15 MILLIGRAM(S): KIT at 17:58

## 2020-09-08 RX ADMIN — GABAPENTIN 200 MILLIGRAM(S): 400 CAPSULE ORAL at 13:10

## 2020-09-08 RX ADMIN — Medication 10 MILLIGRAM(S): at 11:58

## 2020-09-08 RX ADMIN — Medication 325 MILLIGRAM(S): at 12:01

## 2020-09-08 RX ADMIN — OXYCODONE HYDROCHLORIDE 5 MILLIGRAM(S): 5 TABLET ORAL at 18:45

## 2020-09-08 RX ADMIN — Medication 650 MILLIGRAM(S): at 21:39

## 2020-09-08 RX ADMIN — Medication 1 MILLIGRAM(S): at 12:01

## 2020-09-08 RX ADMIN — ATORVASTATIN CALCIUM 40 MILLIGRAM(S): 80 TABLET, FILM COATED ORAL at 21:27

## 2020-09-08 RX ADMIN — Medication 650 MILLIGRAM(S): at 04:16

## 2020-09-08 RX ADMIN — PANTOPRAZOLE SODIUM 40 MILLIGRAM(S): 20 TABLET, DELAYED RELEASE ORAL at 06:30

## 2020-09-08 RX ADMIN — GABAPENTIN 200 MILLIGRAM(S): 400 CAPSULE ORAL at 21:27

## 2020-09-08 RX ADMIN — Medication 12.5 MILLIGRAM(S): at 06:30

## 2020-09-08 RX ADMIN — LIDOCAINE 1 PATCH: 4 CREAM TOPICAL at 00:15

## 2020-09-08 RX ADMIN — Medication 81 MILLIGRAM(S): at 12:00

## 2020-09-08 RX ADMIN — Medication 650 MILLIGRAM(S): at 05:03

## 2020-09-08 RX ADMIN — GABAPENTIN 100 MILLIGRAM(S): 400 CAPSULE ORAL at 06:30

## 2020-09-08 RX ADMIN — Medication 12.5 MILLIGRAM(S): at 18:05

## 2020-09-08 RX ADMIN — Medication 100 MILLIGRAM(S): at 12:01

## 2020-09-08 RX ADMIN — OXYCODONE HYDROCHLORIDE 5 MILLIGRAM(S): 5 TABLET ORAL at 18:02

## 2020-09-08 RX ADMIN — Medication 650 MILLIGRAM(S): at 22:38

## 2020-09-08 RX ADMIN — POLYETHYLENE GLYCOL 3350 17 GRAM(S): 17 POWDER, FOR SOLUTION ORAL at 12:02

## 2020-09-08 NOTE — PROVIDER CONTACT NOTE (OTHER) - SITUATION
Rectal temp reassessed, remains the same Rectal temp reassessed, remains the same. Pt without BM since 9/3, Miralax PRN administered by day shift but pt remains uncomfortable

## 2020-09-08 NOTE — PROVIDER CONTACT NOTE (OTHER) - ASSESSMENT
Temp: 100.3 F (rectal), pt denies chills or feelings associated with fever Temp: 100.3 F (rectal), pt denies chills or feelings associated with fever. Pt uncomfortable with constipation, requesting something to help

## 2020-09-08 NOTE — PROVIDER CONTACT NOTE (OTHER) - ASSESSMENT
Temp: 99.3 F (oral), 100.3 F (rectal). Pt complaining of regular pain relating to neck, Tylenol given for pain, will reassess pain and temp in one hour Temp: 99.3 F (oral), 100.3 F (rectal). Pt complaining of regular pain relating to neck that has persisted since admission, Tylenol given for pain, will reassess pain and temp in one hour. no complaints of chills, n/v

## 2020-09-08 NOTE — PROVIDER CONTACT NOTE (OTHER) - ASSESSMENT
BP: 145/78, HR: 79 O2: 96% RR: 15, Temp: 100.7 F (rectal). Pt remains AOx4 denies chills, states he is comfortable in bed

## 2020-09-08 NOTE — CONSULT NOTE ADULT - ASSESSMENT
Imp    PATIENT WITH HX OF CAD AND NL EF, HX OF PM INSERTION. STATES PM WAS LAST CHECKED IN JUNE      SUGGEST  WILL GET PM INTERROGATION TO ASSESS FOR ANY ISSUES  SUGGEST ORTHOSTATIC BP CHECKS

## 2020-09-08 NOTE — PROVIDER CONTACT NOTE (OTHER) - SITUATION
Temperature remains elevated, rectal temp 100.7 F after Tylenol. UA was sent, results came back positive Nitrate and moderate bacteria

## 2020-09-08 NOTE — PROVIDER CONTACT NOTE (OTHER) - ASSESSMENT
Pt AOx4, BP: 154/87, HR: 82, O2: 98%, RR: 15, Temp 100 F (oral), 101.1 F (rectal). Pt denies chills or discomfort

## 2020-09-08 NOTE — PROGRESS NOTE ADULT - SUBJECTIVE AND OBJECTIVE BOX
CHIEF COMPLAINT: Neck pain, shoulder pain. Presyncope with BMs. Constipation.       HISTORY OF PRESENT ILLNESS  78 yo male PMHx of HTN, CAD s/p stents x 3 (2005), CKD, Afib on Xarelto, s/p Medtronic PPM, myositis, presented to ED 8/30 with left weakness and neck pain. 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 Neurology and neurosurgery consulted. Per neuro evaluation, subcortical CVA suspected, ?right medullary/ pontine but this is uncertain/possibly due to small vessel disease versus cardioembolism related to atrial fibrillation. Unable to confirm with MRI- Pt with PPM that is not compatible with MRI. CTH repeated and stable. TTE EF 65%, no evidence of PFO. Pt with CT spine showing Multilevel cervical spondylosis with severe stenosis at C4-C5. Per Neurosurgery followup with Dr Douglas for aneurysm and C spine stenosis. No surgical intervention at this time. Evaluated by PMR and acute rehab recommended. (03 Sep 2020 15:20)      PAST MEDICAL & SURGICAL HISTORY:  Myositis  Hyperlipidemia  Hypertension  GI bleed  Diverticulitis  No significant past surgical history      VITALS  Vital Signs Last 24 Hrs  T(C): 36.7 (08 Sep 2020 08:29), Max: 37.9 (07 Sep 2020 21:35)  T(F): 98 (08 Sep 2020 08:29), Max: 100.3 (07 Sep 2020 21:35)  HR: 77 (08 Sep 2020 08:29) (73 - 77)  BP: 132/90 (08 Sep 2020 08:29) (127/80 - 132/90)  BP(mean): --  RR: 14 (08 Sep 2020 08:29) (14 - 15)  SpO2: 100% (08 Sep 2020 08:29) (100% - 100%)      PHYSICAL EXAM  Constitutional - In bed, limiting movement 2/2 neck pain  HEENT - EOMI  Neck - Supple, limited ROM 2/2 pain  Chest - CTA bilaterally  Cardiovascular - RR  Abdomen - BS+, Soft, NTND  Extremities - No C/C/E, No calf tenderness   Skin-no rash      Neurologic Exam - Awake, Alert, without new deficits.     Balance - impaired     Psychiatric - Mood stable, Affect WNL       FUNCTIONAL PROGRESS  Gait - 40ft RW min/mod A  ADLs - min A/CG A  Transfers - min A/CG  Functional transfer - mod A    RECENT LABS                        9.2    6.02  )-----------( 195      ( 08 Sep 2020 05:30 )             25.9     09-08    138  |  102  |  42<H>  ----------------------------<  115<H>  4.3   |  25  |  1.85<H>    Ca    10.3      08 Sep 2020 05:30    TPro  6.8  /  Alb  3.2<L>  /  TBili  0.5  /  DBili  x   /  AST  19  /  ALT  27  /  AlkPhos  65  09-08      LIVER FUNCTIONS - ( 08 Sep 2020 05:30 )  Alb: 3.2 g/dL / Pro: 6.8 g/dL / ALK PHOS: 65 U/L / ALT: 27 U/L / AST: 19 U/L / GGT: x             Direct LDL: 85 mg/dL (08-31-20 @ 06:52)                RADIOLOGY/OTHER RESULTS      CURRENT MEDICATIONS  MEDICATIONS  (STANDING):  allopurinol 100 milliGRAM(s) Oral daily  aspirin enteric coated 81 milliGRAM(s) Oral daily  atorvastatin 40 milliGRAM(s) Oral at bedtime  bisacodyl Suppository 10 milliGRAM(s) Rectal once  ferrous    sulfate 325 milliGRAM(s) Oral daily  folic acid 1 milliGRAM(s) Oral daily  gabapentin 100 milliGRAM(s) Oral three times a day  influenza   Vaccine 0.5 milliLiter(s) IntraMuscular once  metoprolol tartrate 12.5 milliGRAM(s) Oral two times a day  pantoprazole    Tablet 40 milliGRAM(s) Oral before breakfast  polyethylene glycol 3350 17 Gram(s) Oral daily  rivaroxaban 15 milliGRAM(s) Oral with dinner  senna 2 Tablet(s) Oral at bedtime  thiamine 100 milliGRAM(s) Oral daily    MEDICATIONS  (PRN):  acetaminophen   Tablet .. 650 milliGRAM(s) Oral every 6 hours PRN Temp greater or equal to 38C (100.4F), Mild Pain (1 - 3)  guaiFENesin   Syrup  (Sugar-Free) 100 milliGRAM(s) Oral every 6 hours PRN Cough  oxyCODONE    IR 5 milliGRAM(s) Oral every 8 hours PRN Severe Pain (7 - 10)      ASSESSMENT & PLAN      GI/Bowel Management - Miralax, Senna, Prunes, Dulcolax supp prn-dose today.    Management - Toilet Q2, check UA for frequency  Skin - Turn Q2  Pain - Tylenol PRN  DVT PPX - Xarelto  Diet - reg    Continue comprehensive acute rehab program consisting of 3hrs/day of OT/PT and SLP.

## 2020-09-08 NOTE — CONSULT NOTE ADULT - SUBJECTIVE AND OBJECTIVE BOX
Chief Complaint: NEAR SYNCOPE    HPI:79 yr old man in Rehab for cva, states he is in constant pain from spinal stenosis who has had 2 episodes of near fainting. he has a pm placed in 2014 which he states was placed for fainting. he says theseepisodes occur with standing and walking 'fast" asnd are unlike what he had in the past. he has a hx of cad with stents. recent echo nl ef    PMH:   Myositis  Hyperlipidemia  Hypertension  GI bleed  Diverticulitis    PSH:   No significant past surgical history    Family History:  FAMILY HISTORY:  Family history of cerebrovascular accident (CVA) (Sibling): Multiple brothers  Family history of heart disease      Social History:  Smoking:states he smoked  1/2 ppw and stopped in 1975  Alcohol:denies  Drugs:denies    Allergies:  No Known Allergies      Medications:  acetaminophen   Tablet .. 650 milliGRAM(s) Oral every 6 hours PRN  allopurinol 100 milliGRAM(s) Oral daily  aspirin enteric coated 81 milliGRAM(s) Oral daily  atorvastatin 40 milliGRAM(s) Oral at bedtime  ferrous    sulfate 325 milliGRAM(s) Oral daily  folic acid 1 milliGRAM(s) Oral daily  gabapentin 200 milliGRAM(s) Oral three times a day  guaiFENesin   Syrup  (Sugar-Free) 100 milliGRAM(s) Oral every 6 hours PRN  influenza   Vaccine 0.5 milliLiter(s) IntraMuscular once  metoprolol tartrate 12.5 milliGRAM(s) Oral two times a day  oxyCODONE    IR 5 milliGRAM(s) Oral every 8 hours PRN  pantoprazole    Tablet 40 milliGRAM(s) Oral before breakfast  polyethylene glycol 3350 17 Gram(s) Oral daily  rivaroxaban 15 milliGRAM(s) Oral with dinner  senna 2 Tablet(s) Oral at bedtime  thiamine 100 milliGRAM(s) Oral daily      REVIEW OF SYSTEMS:  CONSTITUTIONAL: No fever, weight loss, or fatigue  EYES: No eye pain, visual disturbances, or discharge  ENMT:  No difficulty hearing, tinnitus, vertigo; No sinus or throat pain  NECK: No pain or stiffness  BREASTS: No pain, masses, or nipple discharge  RESPIRATORY: No cough, wheezing, chills or hemoptysis; No shortness of breath  CARDIOVASCULAR: No chest pain, palpitations, dizziness, or leg swelling  GASTROINTESTINAL: No abdominal or epigastric pain. No nausea, vomiting, or hematemesis; No diarrhea or constipation. No melena or hematochezia.  GENITOURINARY: No dysuria, frequency, hematuria, or incontinence  NEUROLOGICAL: No headaches, memory loss, loss of strength, numbness, or tremors  SKIN: No itching, burning, rashes, or lesions   LYMPH NODES: No enlarged glands  ENDOCRINE: No heat or cold intolerance; No hair loss  MUSCULOSKELETAL: constant pain  PSYCHIATRIC: No depression, anxiety, mood swings, or difficulty sleeping  HEME/LYMPH: No easy bruising, or bleeding gums  ALLERY AND IMMUNOLOGIC: No hives or eczema    Physical Exam:  T(C): 36.7 (09-08-20 @ 08:29), Max: 37.9 (09-07-20 @ 21:35)  HR: 77 (09-08-20 @ 08:29) (73 - 77)  BP: 132/90 (09-08-20 @ 08:29) (127/80 - 132/90)  RR: 14 (09-08-20 @ 08:29) (14 - 15)  SpO2: 100% (09-08-20 @ 08:29) (100% - 100%)  Wt(kg): --    GENERAL: NAD, well-groomed, well-developed  HEAD:  Atraumatic, Normocephalic  EYES: EOMI, conjunctiva and sclera clear  ENT: Moist mucous membranes,  NECK: Supple, No JVD, no bruits  CHEST/LUNG: Clear to percussion bilaterally; No rales, rhonchi, wheezing, or rubs  HEART: Regular rate and rhythm; No murmurs, rubs, or gallops PMI non displaced.  ABDOMEN: Soft, Nontender, Nondistended; Bowel sounds present  EXTREMITIES:  2+ Peripheral Pulses, No clubbing, cyanosis, or edema  SKIN: No rashes or lesions  NERVOUS SYSTEM:  Alert & Oriented X3, Good concentration; Motor Strength 5/5 B/L upper and lower extremities; DTRs 2+ intact and symmetric    Cardiovascular Diagnostic Testing:  ECG:rsr nl ekg    Labs:                        9.2    6.02  )-----------( 195      ( 08 Sep 2020 05:30 )             25.9     09-08    138  |  102  |  42<H>  ----------------------------<  115<H>  4.3   |  25  |  1.85<H>    Ca    10.3      08 Sep 2020 05:30    TPro  6.8  /  Alb  3.2<L>  /  TBili  0.5  /  DBili  x   /  AST  19  /  ALT  27  /  AlkPhos  65  09-08      CARDIAC MARKERS ( 07 Sep 2020 15:35 )  <.017 ng/mL / x     / x     / x     / x                    Imaging:cdxr-hyperinflated lung fields

## 2020-09-08 NOTE — PROGRESS NOTE ADULT - ATTENDING COMMENTS
-Cervical spine multilevel disc disease with spinal stenosis and mild cervical myelopathy on exam ( bilateral LE weakness with knee buckling in therapy) and neck pain with and without ROM radiating to both shoulders  Will obtain C spine CT  Pain control ( small dose opiates, titrate Neurontin)  Cervical collar, spinal precautions, fall precautions    -Hypotensive episode yesterday requiring IVF - will ask Cardiology to evaluate for BP management and pacemaker interrogation?  -Dysuria / Neurogenic bladder with frequency and urgency- will obtain UA  - Xarelto/ASA/Statin     Multidisciplinary team meeting today:  patient's functional goals and needs, functional and clinical  progress were discussed, barriers to discharge were identified. Anticipate discharge home with home care, 24/7 supervision for safety.    EDOD 9/24/20

## 2020-09-08 NOTE — PROGRESS NOTE ADULT - ASSESSMENT
EVERARDO FIGUEROA is a 78yo Male with possible right sided subcortical CVA with left sided weakness and with decreased functional mobility, gait instability and ADL impairments.    - COMORBIDITES/ACTIVE MEDICAL ISSUES     Gait Instability, ADL impairments and Functional impairments:  Comprehensive Rehab Program of PT/OT/SLP     #CVA  - PT/OT/SLP continues. Activities limited by neck pain.   -Xarelto daily for Hx Afib, Guaiac neg, no overt bleeding noted.  -ASA  for CAD-no chest pain reported.    - Lipitor for goal LDL < 70  - GI ppx with Protonix       #Hx Unruptured aneurysm  -outpt follow up Neurosurgery  -no headache, awake and alert    #HTN  -Lopressor BID to 12.5mg as per hospitalist plan. Cardiology evaluation for hx recurrent pre-syncope.     #Anemia/thrombocytopenia  -s/p PRBC x 1 unit, on Xarelto, no overt bleeding reported  -labs following  -hematology following    #Neck pain/ Cervical spine stenosis   - Neurontin TID-increase dose to 200mg TID.   - Tylenol PRN, oxycodone prn with bowel regimen. Warm compress to neck ok.  -follow up CT Cspine today.   - Neurosurgical follow as outpatient     #Urinary frequency  -check UA    GI/Bowel Mgmt - Senna,  Miralax, Dulcolax  /Bladder Mgmt - Voiding independently, PVRx1 and low      Precautions / PROPHYLAXIS:   - Falls, Cardiac, Seizure   - Ortho: Weight bearing status: WBAT   - Lungs: Aspiration, Incentive Spirometer   - Pressure injury/Skin: Turn Q2hrs while in bed, OOB to Chair, PT/OT    - DVT: Xarelto EVERARDO FIGUEROA is a 78yo Male with possible right sided subcortical CVA with left sided weakness and with decreased functional mobility, gait instability and ADL impairments.    - COMORBIDITES/ACTIVE MEDICAL ISSUES     Gait Instability, ADL impairments and Functional impairments:  Comprehensive Rehab Program of PT/OT/SLP     #CVA  - PT/OT/SLP .Activities limited by neck pain.   -Xarelto for Hx Afib, Guaiac neg, no overt bleeding noted.  -ASA  for CAD-no chest pain reported.    - Lipitor for goal LDL < 70  - GI ppx with Protonix       #Hx Unruptured aneurysm  -outpt follow up Neurosurgery  -no headache, awake and alert    #HTN  -Lopressor BID to 12.5mg as per hospitalist plan. Cardiology evaluation for hx recurrent pre-syncope.     #Anemia/thrombocytopenia  -s/p PRBC x 1 unit, on Xarelto, no overt bleeding reported  -labs following  -hematology following    #Neck pain/ Cervical spine stenosis   - Neurontin TID-increase dose to 200mg TID.   - Tylenol PRN, oxycodone prn with bowel regimen. Warm compress to neck ok.  -follow up CT Cspine today.   - Neurosurgical follow as outpatient     #Urinary frequency  -check UA    GI/Bowel Mgmt - Senna,  Miralax, Dulcolax  /Bladder Mgmt - Voiding independently, PVRx1 and low      Precautions / PROPHYLAXIS:   - Falls, Cardiac, Seizure   - Ortho: Weight bearing status: WBAT   - Lungs: Aspiration, Incentive Spirometer   - Pressure injury/Skin: Turn Q2hrs while in bed, OOB to Chair, PT/OT    - DVT: Xarelto

## 2020-09-08 NOTE — PROGRESS NOTE ADULT - SUBJECTIVE AND OBJECTIVE BOX
HPI:  80 yo male PMHx of HTN, CAD s/p stents x 3 (2005), CKD, Afib on Xarelto, s/p Medtr[employer] PPM, myositis, presented to ED 8/30 with left weakness and neck pain. 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 Neurology and neurosurgery consulted. Per neuro evaluation, subcortical CVA suspected, ?right medullary/ pontine but this is uncertain/possibly due to small vessel disease versus cardioembolism related to atrial fibrillation. Unable to confirm with MRI- Pt with PPM that is not compatible with MRI. CTH repeated and stable. TTE EF 65%, no evidence of PFO. Pt with CT spine showing Multilevel cervical spondylosis with severe stenosis at C4-C5. Per Neurosurgery followup with Dr Douglas for aneurysm and C spine stenosis. No surgical intervention at this time. Evaluated by PMR and acute rehab recommended. (03 Sep 2020 15:20)      Subjective    c/o b/l leg pain and neck pain(not new)        PAST MEDICAL & SURGICAL HISTORY:  Myositis  Hyperlipidemia  Hypertension  GI bleed  Diverticulitis  No significant past surgical history      MedsMEDICATIONS  (STANDING):  allopurinol 100 milliGRAM(s) Oral daily  aspirin enteric coated 81 milliGRAM(s) Oral daily  atorvastatin 40 milliGRAM(s) Oral at bedtime  ferrous    sulfate 325 milliGRAM(s) Oral daily  folic acid 1 milliGRAM(s) Oral daily  gabapentin 200 milliGRAM(s) Oral three times a day  influenza   Vaccine 0.5 milliLiter(s) IntraMuscular once  metoprolol tartrate 12.5 milliGRAM(s) Oral two times a day  pantoprazole    Tablet 40 milliGRAM(s) Oral before breakfast  polyethylene glycol 3350 17 Gram(s) Oral daily  rivaroxaban 15 milliGRAM(s) Oral with dinner  senna 2 Tablet(s) Oral at bedtime  thiamine 100 milliGRAM(s) Oral daily    MEDICATIONS  (PRN):  acetaminophen   Tablet .. 650 milliGRAM(s) Oral every 6 hours PRN Temp greater or equal to 38C (100.4F), Mild Pain (1 - 3)  guaiFENesin   Syrup  (Sugar-Free) 100 milliGRAM(s) Oral every 6 hours PRN Cough  oxyCODONE    IR 5 milliGRAM(s) Oral every 8 hours PRN Severe Pain (7 - 10)      Vital Signs Last 24 Hrs  T(C): 36.7 (08 Sep 2020 08:29), Max: 37.9 (07 Sep 2020 21:35)  T(F): 98 (08 Sep 2020 08:29), Max: 100.3 (07 Sep 2020 21:35)  HR: 77 (08 Sep 2020 08:29) (73 - 77)  BP: 132/90 (08 Sep 2020 08:29) (127/80 - 132/90)  BP(mean): --  RR: 14 (08 Sep 2020 08:29) (14 - 15)  SpO2: 100% (08 Sep 2020 08:29) (100% - 100%)  I&O's Summary    07 Sep 2020 07:01  -  08 Sep 2020 07:00  --------------------------------------------------------  IN: 420 mL / OUT: 750 mL / NET: -330 mL        PHYSICAL EXAM:  GENERAL: NAD  NECK: Supple  NERVOUS SYSTEM:  awake and alert  HEART: S1s2 NL , RRR  CHEST/LUNG: Clear to percussion bilaterally  ABDOMEN: Soft, Nontender, Nondistended; Bowel sounds present  EXTREMITIES:  No edema      LABS:(09-08 @ 05:30)                      9.2  6.02 )-----------( 195                 25.9    Neutrophils = -- (--%)  Lymphocytes = -- (--%)  Eosinophils = -- (--%)  Basophils = -- (--%)  Monocytes = -- (--%)  Bands = --%    09-08    138  |  102  |  42<H>  ----------------------------<  115<H>  4.3   |  25  |  1.85<H>    Ca    10.3      08 Sep 2020 05:30    TPro  6.8  /  Alb  3.2<L>  /  TBili  0.5  /  DBili  x   /  AST  19  /  ALT  27  /  AlkPhos  65  09-08      Care Discussed with Consultants/Other Providers [ x] YES  [ ] NO    CVA  PT/OT per rehab  ASA/Statin/Xarelto    presyncope  improved with after IVF yesterday  Encourage fluid intake    Anemia  Monitor h/h  Iron    Thrombocytopenia  Resolved    PAF  Xarelto/BB    Neck/leg pain, not new  Pt declined lidoderm  APAP/gabapentin

## 2020-09-08 NOTE — PROVIDER CONTACT NOTE (OTHER) - ACTION/TREATMENT ORDERED:
MD made aware, no new orders at this time MD made aware, no new orders at this time for temp elevation. MD will order suppository

## 2020-09-09 LAB
ALBUMIN SERPL ELPH-MCNC: 3.5 G/DL — SIGNIFICANT CHANGE UP (ref 3.3–5)
ALP SERPL-CCNC: 73 U/L — SIGNIFICANT CHANGE UP (ref 40–120)
ALT FLD-CCNC: 28 U/L — SIGNIFICANT CHANGE UP (ref 10–45)
ANION GAP SERPL CALC-SCNC: 10 MMOL/L — SIGNIFICANT CHANGE UP (ref 5–17)
AST SERPL-CCNC: 16 U/L — SIGNIFICANT CHANGE UP (ref 10–40)
BASOPHILS # BLD AUTO: 0.03 K/UL — SIGNIFICANT CHANGE UP (ref 0–0.2)
BASOPHILS NFR BLD AUTO: 0.4 % — SIGNIFICANT CHANGE UP (ref 0–2)
BILIRUB SERPL-MCNC: 0.4 MG/DL — SIGNIFICANT CHANGE UP (ref 0.2–1.2)
BUN SERPL-MCNC: 46 MG/DL — HIGH (ref 7–23)
CALCIUM SERPL-MCNC: 11 MG/DL — HIGH (ref 8.4–10.5)
CHLORIDE SERPL-SCNC: 99 MMOL/L — SIGNIFICANT CHANGE UP (ref 96–108)
CO2 SERPL-SCNC: 28 MMOL/L — SIGNIFICANT CHANGE UP (ref 22–31)
CREAT SERPL-MCNC: 1.95 MG/DL — HIGH (ref 0.5–1.3)
D DIMER BLD IA.RAPID-MCNC: <150 NG/ML DDU — SIGNIFICANT CHANGE UP
EOSINOPHIL # BLD AUTO: 0.08 K/UL — SIGNIFICANT CHANGE UP (ref 0–0.5)
EOSINOPHIL NFR BLD AUTO: 1 % — SIGNIFICANT CHANGE UP (ref 0–6)
GLUCOSE SERPL-MCNC: 97 MG/DL — SIGNIFICANT CHANGE UP (ref 70–99)
HCT VFR BLD CALC: 27.1 % — LOW (ref 39–50)
HGB BLD-MCNC: 9.5 G/DL — LOW (ref 13–17)
IMM GRANULOCYTES NFR BLD AUTO: 0.4 % — SIGNIFICANT CHANGE UP (ref 0–1.5)
LACTATE SERPL-SCNC: 2.3 MMOL/L — HIGH (ref 0.7–2)
LYMPHOCYTES # BLD AUTO: 1.15 K/UL — SIGNIFICANT CHANGE UP (ref 1–3.3)
LYMPHOCYTES # BLD AUTO: 15 % — SIGNIFICANT CHANGE UP (ref 13–44)
MCHC RBC-ENTMCNC: 33.9 PG — SIGNIFICANT CHANGE UP (ref 27–34)
MCHC RBC-ENTMCNC: 35.1 GM/DL — SIGNIFICANT CHANGE UP (ref 32–36)
MCV RBC AUTO: 96.8 FL — SIGNIFICANT CHANGE UP (ref 80–100)
MONOCYTES # BLD AUTO: 0.65 K/UL — SIGNIFICANT CHANGE UP (ref 0–0.9)
MONOCYTES NFR BLD AUTO: 8.5 % — SIGNIFICANT CHANGE UP (ref 2–14)
NEUTROPHILS # BLD AUTO: 5.75 K/UL — SIGNIFICANT CHANGE UP (ref 1.8–7.4)
NEUTROPHILS NFR BLD AUTO: 74.7 % — SIGNIFICANT CHANGE UP (ref 43–77)
NRBC # BLD: 0 /100 WBCS — SIGNIFICANT CHANGE UP (ref 0–0)
PLATELET # BLD AUTO: 219 K/UL — SIGNIFICANT CHANGE UP (ref 150–400)
POTASSIUM SERPL-MCNC: 4.1 MMOL/L — SIGNIFICANT CHANGE UP (ref 3.5–5.3)
POTASSIUM SERPL-SCNC: 4.1 MMOL/L — SIGNIFICANT CHANGE UP (ref 3.5–5.3)
PROCALCITONIN SERPL-MCNC: 0.83 NG/ML — HIGH
PROT SERPL-MCNC: 7.7 G/DL — SIGNIFICANT CHANGE UP (ref 6–8.3)
RBC # BLD: 2.8 M/UL — LOW (ref 4.2–5.8)
RBC # FLD: 15.9 % — HIGH (ref 10.3–14.5)
SODIUM SERPL-SCNC: 137 MMOL/L — SIGNIFICANT CHANGE UP (ref 135–145)
WBC # BLD: 7.69 K/UL — SIGNIFICANT CHANGE UP (ref 3.8–10.5)
WBC # FLD AUTO: 7.69 K/UL — SIGNIFICANT CHANGE UP (ref 3.8–10.5)

## 2020-09-09 PROCEDURE — 99232 SBSQ HOSP IP/OBS MODERATE 35: CPT

## 2020-09-09 PROCEDURE — 99233 SBSQ HOSP IP/OBS HIGH 50: CPT

## 2020-09-09 PROCEDURE — 71045 X-RAY EXAM CHEST 1 VIEW: CPT | Mod: 26

## 2020-09-09 RX ORDER — VANCOMYCIN HCL 1 G
VIAL (EA) INTRAVENOUS
Refills: 0 | Status: DISCONTINUED | OUTPATIENT
Start: 2020-09-09 | End: 2020-09-09

## 2020-09-09 RX ORDER — PIPERACILLIN AND TAZOBACTAM 4; .5 G/20ML; G/20ML
3.38 INJECTION, POWDER, LYOPHILIZED, FOR SOLUTION INTRAVENOUS EVERY 8 HOURS
Refills: 0 | Status: COMPLETED | OUTPATIENT
Start: 2020-09-09 | End: 2020-09-13

## 2020-09-09 RX ORDER — PIPERACILLIN AND TAZOBACTAM 4; .5 G/20ML; G/20ML
4.5 INJECTION, POWDER, LYOPHILIZED, FOR SOLUTION INTRAVENOUS ONCE
Refills: 0 | Status: DISCONTINUED | OUTPATIENT
Start: 2020-09-09 | End: 2020-09-09

## 2020-09-09 RX ORDER — VANCOMYCIN HCL 1 G
750 VIAL (EA) INTRAVENOUS ONCE
Refills: 0 | Status: DISCONTINUED | OUTPATIENT
Start: 2020-09-09 | End: 2020-09-09

## 2020-09-09 RX ORDER — PIPERACILLIN AND TAZOBACTAM 4; .5 G/20ML; G/20ML
4.5 INJECTION, POWDER, LYOPHILIZED, FOR SOLUTION INTRAVENOUS EVERY 6 HOURS
Refills: 0 | Status: DISCONTINUED | OUTPATIENT
Start: 2020-09-09 | End: 2020-09-09

## 2020-09-09 RX ORDER — PIPERACILLIN AND TAZOBACTAM 4; .5 G/20ML; G/20ML
3.38 INJECTION, POWDER, LYOPHILIZED, FOR SOLUTION INTRAVENOUS ONCE
Refills: 0 | Status: COMPLETED | OUTPATIENT
Start: 2020-09-09 | End: 2020-09-09

## 2020-09-09 RX ORDER — VANCOMYCIN HCL 1 G
750 VIAL (EA) INTRAVENOUS EVERY 24 HOURS
Refills: 0 | Status: COMPLETED | OUTPATIENT
Start: 2020-09-09 | End: 2020-09-13

## 2020-09-09 RX ORDER — SODIUM CHLORIDE 9 MG/ML
1000 INJECTION INTRAMUSCULAR; INTRAVENOUS; SUBCUTANEOUS
Refills: 0 | Status: DISCONTINUED | OUTPATIENT
Start: 2020-09-09 | End: 2020-09-10

## 2020-09-09 RX ADMIN — OXYCODONE HYDROCHLORIDE 5 MILLIGRAM(S): 5 TABLET ORAL at 20:44

## 2020-09-09 RX ADMIN — SODIUM CHLORIDE 75 MILLILITER(S): 9 INJECTION INTRAMUSCULAR; INTRAVENOUS; SUBCUTANEOUS at 14:40

## 2020-09-09 RX ADMIN — PIPERACILLIN AND TAZOBACTAM 25 GRAM(S): 4; .5 INJECTION, POWDER, LYOPHILIZED, FOR SOLUTION INTRAVENOUS at 15:30

## 2020-09-09 RX ADMIN — PANTOPRAZOLE SODIUM 40 MILLIGRAM(S): 20 TABLET, DELAYED RELEASE ORAL at 06:36

## 2020-09-09 RX ADMIN — Medication 250 MILLIGRAM(S): at 15:38

## 2020-09-09 RX ADMIN — OXYCODONE HYDROCHLORIDE 5 MILLIGRAM(S): 5 TABLET ORAL at 09:25

## 2020-09-09 RX ADMIN — Medication 12.5 MILLIGRAM(S): at 06:36

## 2020-09-09 RX ADMIN — RIVAROXABAN 15 MILLIGRAM(S): KIT at 17:15

## 2020-09-09 RX ADMIN — PIPERACILLIN AND TAZOBACTAM 200 GRAM(S): 4; .5 INJECTION, POWDER, LYOPHILIZED, FOR SOLUTION INTRAVENOUS at 15:25

## 2020-09-09 RX ADMIN — OXYCODONE HYDROCHLORIDE 5 MILLIGRAM(S): 5 TABLET ORAL at 02:32

## 2020-09-09 RX ADMIN — Medication 650 MILLIGRAM(S): at 09:00

## 2020-09-09 RX ADMIN — GABAPENTIN 200 MILLIGRAM(S): 400 CAPSULE ORAL at 22:50

## 2020-09-09 RX ADMIN — Medication 100 MILLIGRAM(S): at 12:07

## 2020-09-09 RX ADMIN — PIPERACILLIN AND TAZOBACTAM 25 GRAM(S): 4; .5 INJECTION, POWDER, LYOPHILIZED, FOR SOLUTION INTRAVENOUS at 22:50

## 2020-09-09 RX ADMIN — OXYCODONE HYDROCHLORIDE 5 MILLIGRAM(S): 5 TABLET ORAL at 21:40

## 2020-09-09 RX ADMIN — ATORVASTATIN CALCIUM 40 MILLIGRAM(S): 80 TABLET, FILM COATED ORAL at 22:50

## 2020-09-09 RX ADMIN — Medication 1 MILLIGRAM(S): at 12:07

## 2020-09-09 RX ADMIN — Medication 1 TABLET(S): at 06:36

## 2020-09-09 RX ADMIN — Medication 81 MILLIGRAM(S): at 12:06

## 2020-09-09 RX ADMIN — Medication 12.5 MILLIGRAM(S): at 17:15

## 2020-09-09 RX ADMIN — Medication 650 MILLIGRAM(S): at 08:07

## 2020-09-09 RX ADMIN — GABAPENTIN 200 MILLIGRAM(S): 400 CAPSULE ORAL at 12:08

## 2020-09-09 RX ADMIN — OXYCODONE HYDROCHLORIDE 5 MILLIGRAM(S): 5 TABLET ORAL at 08:50

## 2020-09-09 RX ADMIN — Medication 100 MILLIGRAM(S): at 12:06

## 2020-09-09 RX ADMIN — SENNA PLUS 2 TABLET(S): 8.6 TABLET ORAL at 22:50

## 2020-09-09 RX ADMIN — GABAPENTIN 200 MILLIGRAM(S): 400 CAPSULE ORAL at 06:36

## 2020-09-09 RX ADMIN — Medication 325 MILLIGRAM(S): at 12:07

## 2020-09-09 RX ADMIN — OXYCODONE HYDROCHLORIDE 5 MILLIGRAM(S): 5 TABLET ORAL at 03:30

## 2020-09-09 NOTE — PROGRESS NOTE ADULT - SUBJECTIVE AND OBJECTIVE BOX
HPI:  80 yo male PMHx of HTN, CAD s/p stents x 3 (), CKD, Afib on Xarelto, s/p Medtronic PPM, myositis, presented to ED  with left weakness and neck pain. 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 Neurology and neurosurgery consulted. Per neuro evaluation, subcortical CVA suspected, ?right medullary/ pontine but this is uncertain/possibly due to small vessel disease versus cardioembolism related to atrial fibrillation. Unable to confirm with MRI- Pt with PPM that is not compatible with MRI. CTH repeated and stable. TTE EF 65%, no evidence of PFO. Pt with CT spine showing Multilevel cervical spondylosis with severe stenosis at C4-C5. Per Neurosurgery followup with Dr Douglas for aneurysm and C spine stenosis. No surgical intervention at this time. Evaluated by PMR and acute rehab recommended. (03 Sep 2020 15:20)      Subjective    Neck pain about same.   Pos fever last night. Denies dysuria, urin freq, abd pain, n/v, Sob, cough.   Feels tired today.         PAST MEDICAL & SURGICAL HISTORY:  Myositis  Hyperlipidemia  Hypertension  GI bleed  Diverticulitis  No significant past surgical history      MedsMEDICATIONS  (STANDING):  allopurinol 100 milliGRAM(s) Oral daily  aspirin enteric coated 81 milliGRAM(s) Oral daily  atorvastatin 40 milliGRAM(s) Oral at bedtime  ferrous    sulfate 325 milliGRAM(s) Oral daily  folic acid 1 milliGRAM(s) Oral daily  gabapentin 200 milliGRAM(s) Oral three times a day  influenza   Vaccine 0.5 milliLiter(s) IntraMuscular once  metoprolol tartrate 12.5 milliGRAM(s) Oral two times a day  pantoprazole    Tablet 40 milliGRAM(s) Oral before breakfast  polyethylene glycol 3350 17 Gram(s) Oral daily  rivaroxaban 15 milliGRAM(s) Oral with dinner  senna 2 Tablet(s) Oral at bedtime  thiamine 100 milliGRAM(s) Oral daily    MEDICATIONS  (PRN):  acetaminophen   Tablet .. 650 milliGRAM(s) Oral every 6 hours PRN Temp greater or equal to 38C (100.4F), Mild Pain (1 - 3)  guaiFENesin   Syrup  (Sugar-Free) 100 milliGRAM(s) Oral every 6 hours PRN Cough  oxyCODONE    IR 5 milliGRAM(s) Oral every 8 hours PRN Severe Pain (7 - 10)      Vital Signs Last 24 Hrs  T(C): 36.7 (09 Sep 2020 07:51), Max: 38.4 (08 Sep 2020 21:38)  T(F): 98 (09 Sep 2020 07:51), Max: 101.1 (08 Sep 2020 21:38)  HR: 68 (09 Sep 2020 07:51) (68 - 85)  BP: 157/92 (09 Sep 2020 07:51) (145/78 - 165/89)  BP(mean): --  RR: 15 (09 Sep 2020 07:51) (14 - 15)  SpO2: 98% (09 Sep 2020 07:51) (96% - 100%)  I&O's Summary    08 Sep 2020 07:01  -  09 Sep 2020 07:00  --------------------------------------------------------  IN: 420 mL / OUT: 650 mL / NET: -230 mL        PHYSICAL EXAM:  GENERAL: NAD  NECK: Supple  NERVOUS SYSTEM:  awake and alert  HEART: S1s2 NL , RRR  CHEST/LUNG: Clear to percussion bilaterally  ABDOMEN: Soft, Nontender, Nondistended; Bowel sounds present  EXTREMITIES:  No edema      LABS:( @ 05:00)                      9.5  7.69 )-----------( 219                 27.1    Neutrophils = 5.75 (74.7%)  Lymphocytes = 1.15 (15.0%)  Eosinophils = 0.08 (1.0%)  Basophils = 0.03 (0.4%)  Monocytes = 0.65 (8.5%)  Bands = --%        138  |  102  |  42<H>  ----------------------------<  115<H>  4.3   |  25  |  1.85<H>    Ca    10.3      08 Sep 2020 05:30    TPro  6.8  /  Alb  3.2<L>  /  TBili  0.5  /  DBili  x   /  AST  19  /  ALT  27  /  AlkPhos  65        Urinalysis Basic - ( 08 Sep 2020 21:47 )    Color: Yellow / Appearance: Clear / S.010 / pH: x  Gluc: x / Ketone: Negative  / Bili: Negative / Urobili: Negative   Blood: x / Protein: 15 / Nitrite: Positive   Leuk Esterase: Small / RBC: 0-4 /HPF / WBC 3-5 /HPF   Sq Epi: x / Non Sq Epi: Neg.-Few / Bacteria: Moderate /HPF      CAPILLARY BLOOD GLUCOSE          Imaging Personally Reviewed:  [ ] YES  [ ] NO        Care Discussed with Consultants/Other Providers [ x] YES  [ ] NO    CVA  PT/OT per rehab  ASA/Statin/Xarelto    presyncope  Resolved  PPM interrogation per Card    Anemia  Monitor h/h  Iron  Guaiac pos-GI consult placed by primary team    Thrombocytopenia  Resolved    PAF  Xarelto/BB    Neck/leg pain, not new  Pt declined lidoderm  APAP/gabapentin    CKD3  Baseline Cr unknown but Cr improving    fever  ua no pyuria-dc bactrim  complete fever w/u with blood c/s, cxr.  check lactate, d-dimer  Pt had neg fever w/u on

## 2020-09-09 NOTE — PROGRESS NOTE ADULT - ASSESSMENT
79-year-old gentleman with history of hypertension, coronary artery disease, chronic kidney disease, atrial fibrillation on Xarelto, who presented to the emergency department 8/30/20 with left-sided weakness and neck pain. 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  Patient is now on the acute rehabilitation unit in [facility].  Hematology consulted for anemia/thrombocytopenia. Of note, patient has been under the hematologic care of Dr. Uribe for his anemia. He also reports history of a bone marrow evaluation years ago (no known diagnosis given). He stated he has a history of intermittently guaiac-positive stools (most recently last month), and is under the gastrointestinal care of Dr. Olivo. Plans are for eventual followup endoscopic evaluation.

## 2020-09-09 NOTE — CONSULT NOTE ADULT - SUBJECTIVE AND OBJECTIVE BOX
GI NP Consult     80 yo male PMHx of HTN, CAD s/p stents x 3 (2005), CKD, Afib on Xarelto, s/p Medtronic PPM, myositis, presented to ED 8/30 with left weakness and neck pain. 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 Neurology and neurosurgery consulted. Per neuro evaluation, subcortical CVA suspected, ?right medullary/ pontine but this is uncertain/possibly due to small vessel disease versus cardio embolism related to atrial fibrillation. Unable to confirm with MRI- Pt with PPM that is not compatible with MRI. CTH repeated and stable. TTE EF 65%, no evidence of PFO. Pt with CT spine showing Multilevel cervical spondylosis with severe stenosis at C4-C5. Per Neurosurgery followup with Dr Douglas for aneurysm and C spine stenosis. No surgical intervention at this time. Evaluated by PMR and acute rehab recommended. GI called for anemia     Allergies    No Known Allergies    Intolerances      MEDICATIONS:  MEDICATIONS  (STANDING):  allopurinol 100 milliGRAM(s) Oral daily  aspirin enteric coated 81 milliGRAM(s) Oral daily  atorvastatin 40 milliGRAM(s) Oral at bedtime  ferrous    sulfate 325 milliGRAM(s) Oral daily  folic acid 1 milliGRAM(s) Oral daily  gabapentin 200 milliGRAM(s) Oral three times a day  influenza   Vaccine 0.5 milliLiter(s) IntraMuscular once  metoprolol tartrate 12.5 milliGRAM(s) Oral two times a day  pantoprazole    Tablet 40 milliGRAM(s) Oral before breakfast  piperacillin/tazobactam IVPB. 3.375 Gram(s) IV Intermittent once  piperacillin/tazobactam IVPB.. 3.375 Gram(s) IV Intermittent every 8 hours  polyethylene glycol 3350 17 Gram(s) Oral daily  rivaroxaban 15 milliGRAM(s) Oral with dinner  senna 2 Tablet(s) Oral at bedtime  sodium chloride 0.9%. 1000 milliLiter(s) (75 mL/Hr) IV Continuous <Continuous>  thiamine 100 milliGRAM(s) Oral daily  vancomycin  IVPB 750 milliGRAM(s) IV Intermittent every 24 hours    MEDICATIONS  (PRN):  acetaminophen   Tablet .. 650 milliGRAM(s) Oral every 6 hours PRN Temp greater or equal to 38C (100.4F), Mild Pain (1 - 3)  guaiFENesin   Syrup  (Sugar-Free) 100 milliGRAM(s) Oral every 6 hours PRN Cough  oxyCODONE    IR 5 milliGRAM(s) Oral every 8 hours PRN Severe Pain (7 - 10)    PAST MEDICAL & SURGICAL HISTORY:  Myositis  Hyperlipidemia  Hypertension  GI bleed  Diverticulitis  No significant past surgical history    FAMILY HISTORY:  Family history of cerebrovascular accident (CVA) (Sibling): Multiple brothers  Family history of heart disease    SOCIAL HISTORY:  [place]: [ ] Current, [ ] Former, [ ] Never; Pack Years:  Alcohol:  Illicit Drugs:    REVIEW OF SYSTEMS:  Constitutional: No fever, chills, weight loss, or fatigue  ENMT:  No visual changes; No difficulty hearing, tinnitus, vertigo; No sinus or throat pain  Neck: No pain or stiffness  Respiratory: No cough, wheezing, or hemoptysis; No shortness of breath  Cardiovascular: No chest pain, palpitations, dizziness, orthopnea, PND, or leg swelling  Gastrointestinal: No abdominal or epigastric pain. No  nausea, vomiting, or hematemesis. No diarrhea, constipation,  Genitourinary: No dysuria, urinary frequency/hesitancy, or hematuria  Skin: No itching, burning, rashes or lesions     Vital Signs Last 24 Hrs  T(C): 36.5 (09 Sep 2020 13:34), Max: 38.4 (08 Sep 2020 21:38)  T(F): 97.7 (09 Sep 2020 13:34), Max: 101.1 (08 Sep 2020 21:38)  HR: 68 (09 Sep 2020 07:51) (68 - 85)  BP: 138/78 (09 Sep 2020 13:34) (138/78 - 165/89)  BP(mean): --  RR: 15 (09 Sep 2020 13:34) (14 - 15)  SpO2: 98% (09 Sep 2020 13:34) (96% - 100%)    09-08 @ 07:01  -  09-09 @ 07:00  --------------------------------------------------------  IN: 420 mL / OUT: 650 mL / NET: -230 mL    09-09 @ 07:01  -  09-09 @ 14:43  --------------------------------------------------------  IN: 75 mL / OUT: 0 mL / NET: 75 mL        PHYSICAL EXAM:  General: Well developed; thin, in no acute distress  HEENT: MMM, conjunctiva and sclera clear, neck brace in place  Gastrointestinal: Soft, non-tender non-distended; Normal bowel sounds; No rebound or guarding  Extremities: No clubbing, cyanosis or edema  Neurological: Alert and oriented x3  Skin: Warm and dry. No obvious rash    LABS:                        9.5    7.69  )-----------( 219      ( 09 Sep 2020 05:00 )             27.1     09-09    137  |  99  |  46<H>  ----------------------------<  97  4.1   |  28  |  1.95<H>    Ca    11.0<H>      09 Sep 2020 12:00    TPro  7.7  /  Alb  3.5  /  TBili  0.4  /  DBili  x   /  AST  16  /  ALT  28  /  AlkPhos  73  09-09      RADIOLOGY & ADDITIONAL STUDIES:         Assessment/Plan     80 yo male PMHx of HTN, CAD s/p stents x 3 (2005), CKD, Afib on Xarelto, s/p Medtronic PPM, myositis, presented to ED 8/30 with left weakness and neck pain. 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 Neurology and neurosurgery consulted. Per neuro evaluation, subcortical CVA suspected, ?right medullary/ pontine but this is uncertain/possibly due to small vessel disease versus cardio embolism related to atrial fibrillation. Unable to confirm with MRI- Pt with PPM that is not compatible with MRI. CTH repeated and stable. TTE EF 65%, no evidence of PFO. Pt with CT spine showing Multilevel cervical spondylosis with severe stenosis at C4-C5. Per Neurosurgery followup with Dr Douglas for aneurysm and C spine stenosis. No surgical intervention at this time. Evaluated by PMR and acute rehab recommended. GI called for anemia     #Anemia  1. Anemia known to be chronic   2. H/H 9.5/27.1 - baseline per patient   3. Will continue to monitor H/H  4. c/w anticoagulants   5. Pt to follow up with outpatient GI upon D/C  6. Will continue to follow.    7. Pt seen and evaluated with Dr. Lu at bedside       Aggie Cedillo NP  Department of Gastroenterology   [phone]

## 2020-09-09 NOTE — PROGRESS NOTE ADULT - ASSESSMENT
EVERARDO FIGUEROA is a 80yo Male with possible right sided subcortical CVA with left sided weakness and with decreased functional mobility, gait instability and ADL impairments.    - COMORBIDITES/ACTIVE MEDICAL ISSUES     Gait Instability, ADL impairments and Functional impairments:  Comprehensive Rehab Program of PT/OT/SLP     #CVA  - PT/OT/SLP continues. Activities limited by neck pain.   -Xarelto for Hx Afib, Guaiac+ on 9/8, no overt bleeding noted. Cleared by GI for Xarelto to continue. Outpt follow up with Dr Olivo.  -ASA  for CAD-no chest pain reported. PPM interrogated.   - Lipitor for goal LDL < 70  - GI ppx with Protonix       #Hx Unruptured aneurysm  -outpt follow up Neurosurgery  -no headache, awake and alert    #HTN  -Lopressor BID to 12.5mg as per hospitalist plan. Cardiology evaluation for hx recurrent pre-syncope. PPM interrogation today.    #Anemia/thrombocytopenia  -s/p PRBC x 1 unit, on Xarelto, no overt bleeding reported. Guaiac+ noted.   -labs following  -hematology following    #Neck pain/ Cervical spine stenosis   - Neurontin TID- 200mg TID.   - Tylenol PRN, oxycodone prn with bowel regimen. Warm compress to neck ok.  - follow up CT Cspine-completed.   - Neurosurgical follow as outpatient     #Fever  -Low grade temp over night 100.7, resolved this am. UA neg, no leukocytosis.   -medicine follow up-BCx2, DDimer, CS-workup pending    GI/Bowel Mgmt - Senna,  Miralax, Dulcolax  /Bladder Mgmt - Voiding independently, PVRx1 and low      Precautions / PROPHYLAXIS:   - Falls, Cardiac, Seizure   - Ortho: Weight bearing status: WBAT   - Lungs: Aspiration, Incentive Spirometer   - Pressure injury/Skin: Turn Q2hrs while in bed, OOB to Chair, PT/OT    - DVT: Xarelto

## 2020-09-09 NOTE — PROGRESS NOTE ADULT - ATTENDING COMMENTS
Fever work up is in progress, Medicine input noted, ABx and IVF started , will follow recommendations  Severe spinal stenosis on CT of the cervical spine, spinal precautions, pain management is in progress   Neurological exam is unchanged  Full program

## 2020-09-09 NOTE — CONSULT NOTE ADULT - ATTENDING COMMENTS
Patient seen and examined with Aggie Cedillo NP.  Agree with assessment and plan as above.    Elderly male admitted with weakness and neck pain.  She is found to be anemia.  Followed by Dr. Olivo (GI) who is in process of scheduling patient for EGD & Colonoscopy soon.  No melena or BRBPR.    VSS.  Abdomen soft, nontender    Monitor Hgb/Hct and bowel movements  He will follow-up with private GI upon discharge  Diet as tolerated for now

## 2020-09-09 NOTE — PROGRESS NOTE ADULT - SUBJECTIVE AND OBJECTIVE BOX
CHIEF COMPLAINT: Neck pain worse with movement. low grade temp overnight.      HISTORY OF PRESENT ILLNESS  80 yo male PMHx of HTN, CAD s/p stents x 3 (), CKD, Afib on Xarelto, s/p Medtronic PPM, myositis, presented to ED  with left weakness and neck pain. 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 Neurology and neurosurgery consulted. Per neuro evaluation, subcortical CVA suspected, ?right medullary/ pontine but this is uncertain/possibly due to small vessel disease versus cardioembolism related to atrial fibrillation. Unable to confirm with MRI- Pt with PPM that is not compatible with MRI. CTH repeated and stable. TTE EF 65%, no evidence of PFO. Pt with CT spine showing Multilevel cervical spondylosis with severe stenosis at C4-C5. Per Neurosurgery followup with Dr Douglas for aneurysm and C spine stenosis. No surgical intervention at this time. Evaluated by PMR and acute rehab recommended. (03 Sep 2020 15:20)      PAST MEDICAL & SURGICAL HISTORY:  Myositis  Hyperlipidemia  Hypertension  GI bleed  Diverticulitis  No significant past surgical history      VITALS  Vital Signs Last 24 Hrs  T(C): 36.7 (09 Sep 2020 07:51), Max: 38.4 (08 Sep 2020 21:38)  T(F): 98 (09 Sep 2020 07:51), Max: 101.1 (08 Sep 2020 21:38)  HR: 68 (09 Sep 2020 07:51) (68 - 85)  BP: 157/92 (09 Sep 2020 07:51) (145/78 - 165/89)  BP(mean): --  RR: 15 (09 Sep 2020 07:51) (14 - 15)  SpO2: 98% (09 Sep 2020 07:51) (96% - 100%)    PHYSICAL EXAM  Constitutional - In bed, limiting movement 2/2 neck pain  HEENT - EOMI  Neck - Supple, limited ROM 2/2 pain  Chest - CTA bilaterally  Cardiovascular - RR  Abdomen - BS+, Soft, NTND  Extremities - No C/C/E, No calf tenderness   Skin-no rash      Neurologic Exam - Awake, Alert, without new deficits.     Balance - impaired     Psychiatric - Mood stable, Affect WNL       FUNCTIONAL PROGRESS  Gait - 40ft RW min/mod A  ADLs - min A/CG A  Transfers - min A/CG  Functional transfer - mod A    RECENT LABS                        9.5    7.69  )-----------( 219      ( 09 Sep 2020 05:00 )             27.1     -    138  |  102  |  42<H>  ----------------------------<  115<H>  4.3   |  25  |  1.85<H>    Ca    10.3      08 Sep 2020 05:30    TPro  6.8  /  Alb  3.2<L>  /  TBili  0.5  /  DBili  x   /  AST  19  /  ALT  27  /  AlkPhos  65        LIVER FUNCTIONS - ( 08 Sep 2020 05:30 )  Alb: 3.2 g/dL / Pro: 6.8 g/dL / ALK PHOS: 65 U/L / ALT: 27 U/L / AST: 19 U/L / GGT: x           Urinalysis Basic - ( 08 Sep 2020 21:47 )    Color: Yellow / Appearance: Clear / S.010 / pH: x  Gluc: x / Ketone: Negative  / Bili: Negative / Urobili: Negative   Blood: x / Protein: 15 / Nitrite: Positive   Leuk Esterase: Small / RBC: 0-4 /HPF / WBC 3-5 /HPF   Sq Epi: x / Non Sq Epi: Neg.-Few / Bacteria: Moderate /HPF      Direct LDL: 85 mg/dL (20 @ 06:52)                RADIOLOGY/OTHER RESULTS      CURRENT MEDICATIONS  MEDICATIONS  (STANDING):  allopurinol 100 milliGRAM(s) Oral daily  aspirin enteric coated 81 milliGRAM(s) Oral daily  atorvastatin 40 milliGRAM(s) Oral at bedtime  ferrous    sulfate 325 milliGRAM(s) Oral daily  folic acid 1 milliGRAM(s) Oral daily  gabapentin 200 milliGRAM(s) Oral three times a day  influenza   Vaccine 0.5 milliLiter(s) IntraMuscular once  metoprolol tartrate 12.5 milliGRAM(s) Oral two times a day  pantoprazole    Tablet 40 milliGRAM(s) Oral before breakfast  polyethylene glycol 3350 17 Gram(s) Oral daily  rivaroxaban 15 milliGRAM(s) Oral with dinner  senna 2 Tablet(s) Oral at bedtime  thiamine 100 milliGRAM(s) Oral daily    MEDICATIONS  (PRN):  acetaminophen   Tablet .. 650 milliGRAM(s) Oral every 6 hours PRN Temp greater or equal to 38C (100.4F), Mild Pain (1 - 3)  guaiFENesin   Syrup  (Sugar-Free) 100 milliGRAM(s) Oral every 6 hours PRN Cough  oxyCODONE    IR 5 milliGRAM(s) Oral every 8 hours PRN Severe Pain (7 - 10)      ASSESSMENT & PLAN    GI/Bowel Management - Miralax, Senna, Prunes, Dulcolax supp given-BM x1. GI clearance to continue AC.   Management - Toilet Q2, UA neg  Skin - Turn Q2  Pain - Tylenol PRN  DVT PPX - Xarelto continues  Diet - reg    Continue comprehensive acute rehab program consisting of 3hrs/day of OT/PT and SLP. CHIEF COMPLAINT: Neck pain worse with movement. low grade temp overnight.      HISTORY OF PRESENT ILLNESS  80 yo male PMHx of HTN, CAD s/p stents x 3 (), CKD, Afib on Xarelto, s/p Medtronic PPM, myositis, presented to ED  with left weakness and neck pain. 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 Neurology and neurosurgery consulted. Per neuro evaluation, subcortical CVA suspected, ?right medullary/ pontine but this is uncertain/possibly due to small vessel disease versus cardioembolism related to atrial fibrillation. Unable to confirm with MRI- Pt with PPM that is not compatible with MRI. CTH repeated and stable. TTE EF 65%, no evidence of PFO. Pt with CT spine showing Multilevel cervical spondylosis with severe stenosis at C4-C5. Per Neurosurgery followup with Dr Douglas for aneurysm and C spine stenosis. No surgical intervention at this time. Evaluated by PMR and acute rehab recommended. (03 Sep 2020 15:20)      PAST MEDICAL & SURGICAL HISTORY:  Myositis  Hyperlipidemia  Hypertension  GI bleed  Diverticulitis  No significant past surgical history      VITALS  Vital Signs Last 24 Hrs  T(C): 36.7 (09 Sep 2020 07:51), Max: 38.4 (08 Sep 2020 21:38)  T(F): 98 (09 Sep 2020 07:51), Max: 101.1 (08 Sep 2020 21:38)  HR: 68 (09 Sep 2020 07:51) (68 - 85)  BP: 157/92 (09 Sep 2020 07:51) (145/78 - 165/89)  BP(mean): --  RR: 15 (09 Sep 2020 07:51) (14 - 15)  SpO2: 98% (09 Sep 2020 07:51) (96% - 100%)    PHYSICAL EXAM  Constitutional - In bed, limiting movement 2/2 neck pain  HEENT - EOMI  Neck - Supple, limited ROM 2/2 pain  Chest - CTA bilaterally  Cardiovascular - RR  Abdomen - BS+, Soft, NTND  Extremities - No C/C/E, No calf tenderness   Skin-no rash      Neurologic Exam - Awake, Alert, without new deficits.     Balance - impaired     Psychiatric - Mood stable, Affect WNL       FUNCTIONAL PROGRESS  Gait - 40ft RW min/mod A  ADLs - min A/CG A  Transfers - min A/CG  Functional transfer - mod A    RECENT LABS                        9.5    7.69  )-----------( 219      ( 09 Sep 2020 05:00 )             27.1         138  |  102  |  42<H>  ----------------------------<  115<H>  4.3   |  25  |  1.85<H>    Ca    10.3      08 Sep 2020 05:30    TPro  6.8  /  Alb  3.2<L>  /  TBili  0.5  /  DBili  x   /  AST  19  /  ALT  27  /  AlkPhos  65        LIVER FUNCTIONS - ( 08 Sep 2020 05:30 )  Alb: 3.2 g/dL / Pro: 6.8 g/dL / ALK PHOS: 65 U/L / ALT: 27 U/L / AST: 19 U/L / GGT: x           Urinalysis Basic - ( 08 Sep 2020 21:47 )    Color: Yellow / Appearance: Clear / S.010 / pH: x  Gluc: x / Ketone: Negative  / Bili: Negative / Urobili: Negative   Blood: x / Protein: 15 / Nitrite: Positive   Leuk Esterase: Small / RBC: 0-4 /HPF / WBC 3-5 /HPF   Sq Epi: x / Non Sq Epi: Neg.-Few / Bacteria: Moderate /HPF      Direct LDL: 85 mg/dL (20 @ 06:52)                RADIOLOGY/OTHER RESULTS      EXAM:  CT CERVICAL SPINE      PROCEDURE DATE:  2020        INTERPRETATION:  CT cervical spine without contrast    Comparison 7 days ago    History neck pain and weakness    There is reversal of cervical lordosis. There is markedly severe multilevel degenerative disc and endplate change with severe facet arthropathy fairly isolated to C2-3 on the right. There is mild degenerative anterolisthesis at C2-3. Dorsal osteophyte contributes to moderate to severe spinal stenosis at C4-5. Residual AP canal dimension is roughly 3 mm. There is mild stenosis at C3-4 and C5-C6 and C6-7. There is severe bilateral foraminal stenosis at C4-5 with less pronounced change elsewhere. There is no osseous destruction or demineralization. A pacemaker is noted.    IMPRESSION:  Severe spondylosis with spinal stenosis, not significantly changed over one week                MATEUS HAQ M.D., ATTENDING RADIOLOGIST  This document has been electronically signed. Sep  8 2020  3:44PM                CURRENT MEDICATIONS  MEDICATIONS  (STANDING):  allopurinol 100 milliGRAM(s) Oral daily  aspirin enteric coated 81 milliGRAM(s) Oral daily  atorvastatin 40 milliGRAM(s) Oral at bedtime  ferrous    sulfate 325 milliGRAM(s) Oral daily  folic acid 1 milliGRAM(s) Oral daily  gabapentin 200 milliGRAM(s) Oral three times a day  influenza   Vaccine 0.5 milliLiter(s) IntraMuscular once  metoprolol tartrate 12.5 milliGRAM(s) Oral two times a day  pantoprazole    Tablet 40 milliGRAM(s) Oral before breakfast  polyethylene glycol 3350 17 Gram(s) Oral daily  rivaroxaban 15 milliGRAM(s) Oral with dinner  senna 2 Tablet(s) Oral at bedtime  thiamine 100 milliGRAM(s) Oral daily    MEDICATIONS  (PRN):  acetaminophen   Tablet .. 650 milliGRAM(s) Oral every 6 hours PRN Temp greater or equal to 38C (100.4F), Mild Pain (1 - 3)  guaiFENesin   Syrup  (Sugar-Free) 100 milliGRAM(s) Oral every 6 hours PRN Cough  oxyCODONE    IR 5 milliGRAM(s) Oral every 8 hours PRN Severe Pain (7 - 10)      ASSESSMENT & PLAN    GI/Bowel Management - Miralax, Senna, Prunes, Dulcolax supp given-BM x1. GI clearance to continue AC.   Management - Toilet Q2, UA neg  Skin - Turn Q2  Pain - Tylenol PRN  DVT PPX - Xarelto continues  Diet - reg    Continue comprehensive acute rehab program consisting of 3hrs/day of OT/PT and SLP.

## 2020-09-09 NOTE — PROGRESS NOTE ADULT - SUBJECTIVE AND OBJECTIVE BOX
EVERARDO FIGUEROA   79y   Male    Admitting: MANUEL Llanes  HPI:  79-year-old gentleman with history of hypertension, coronary artery disease, chronic kidney disease, atrial fibrillation on Xarelto, who presented to the emergency department 20 with left-sided weakness and neck pain. 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  Patient is now on the acute rehabilitation unit in [facility].  Hematology consulted for anemia/thrombocytopenia. Of note, patient has been under the hematologic care of Dr. Uribe for his anemia. He also reports history of a bone marrow evaluation years ago (no known diagnosis given). He stated he has a history of intermittently guaiac-positive stools (most recently last month), and is under the gastrointestinal care of Dr. Olivo. Plans were for eventual followup endoscopic evaluation.    PAST MEDICAL & SURGICAL HISTORY:  Myositis  Hyperlipidemia  Hypertension  GI bleed  Diverticulitis  No significant past surgical history    HEALTH ISSUES - PROBLEM Dx:  Thrombocytopenia: Thrombocytopenia  Anemia, unspecified type: Anemia, unspecified type    MEDICATIONS  (STANDING):  allopurinol 100 milliGRAM(s) Oral daily  aspirin enteric coated 81 milliGRAM(s) Oral daily  atorvastatin 40 milliGRAM(s) Oral at bedtime  ferrous    sulfate 325 milliGRAM(s) Oral daily  folic acid 1 milliGRAM(s) Oral daily  gabapentin 200 milliGRAM(s) Oral three times a day  influenza   Vaccine 0.5 milliLiter(s) IntraMuscular once  metoprolol tartrate 12.5 milliGRAM(s) Oral two times a day  pantoprazole    Tablet 40 milliGRAM(s) Oral before breakfast  polyethylene glycol 3350 17 Gram(s) Oral daily  rivaroxaban 15 milliGRAM(s) Oral with dinner  senna 2 Tablet(s) Oral at bedtime  thiamine 100 milliGRAM(s) Oral daily  trimethoprim  160 mG/sulfamethoxazole 800 mG 1 Tablet(s) Oral two times a day    MEDICATIONS  (PRN):  acetaminophen   Tablet .. 650 milliGRAM(s) Oral every 6 hours PRN Temp greater or equal to 38C (100.4F), Mild Pain (1 - 3)  guaiFENesin   Syrup  (Sugar-Free) 100 milliGRAM(s) Oral every 6 hours PRN Cough  oxyCODONE    IR 5 milliGRAM(s) Oral every 8 hours PRN Severe Pain (7 - 10)    Allergies    No Known Allergies      INTERVAL HPI/OVERNIGHT EVENTS:  Patient S&E at bedside. No c/o CP, SOB or gross bleeding.     VITAL SIGNS:  T(F): 98 (20 @ 07:51)  HR: 68 (20 @ 07:51)  BP: 157/92 (20 @ 07:51)  RR: 15 (20 @ 07:51)  SpO2: 98% (20 @ 07:51)    PHYSICAL EXAM:  Constitutional: NAD  Eyes: conjunctivae pink  Neck: no JVD  Respiratory: CTA b/l, good air entry b/l ant.  Cardiovascular: RRR, no M/R/G  Gastrointestinal: soft, NTND, no masses palpable, no hepatosplenomegaly  Extremities: no calf tenderness  Neurological: Awake, alert.    Labs:             9.5    7.69  )-----------( 219      (  @ 05:00 )             27.1                9.2    6.02  )-----------( 195      (  @ 05:30 )             25.9                9.7    7.17  )-----------( 186      (  @ 15:35 )             27.6           138  |  102  |  42<H>  ----------------------------<  115<H>  4.3   |  25  |  1.85<H>    Ca    10.3      08 Sep 2020 05:30    TPro  6.8  /  Alb  3.2<L>  /  TBili  0.5  /  DBili  x   /  AST  19  /  ALT  27  /  AlkPhos  65                              Occult Blood, Feces: Positive (20 @ 11:00)    Urinalysis Basic - ( 08 Sep 2020 21:47 )    Color: Yellow / Appearance: Clear / S.010 / pH: x  Gluc: x / Ketone: Negative  / Bili: Negative / Urobili: Negative   Blood: x / Protein: 15 / Nitrite: Positive   Leuk Esterase: Small / RBC: 0-4 /HPF / WBC 3-5 /HPF   Sq Epi: x / Non Sq Epi: Neg.-Few / Bacteria: Moderate /HPF          RADIOLOGY & ADDITIONAL TESTS:  Consultant notes reviewed : YES [ ] ; NO [ ]

## 2020-09-10 LAB
ANION GAP SERPL CALC-SCNC: 11 MMOL/L — SIGNIFICANT CHANGE UP (ref 5–17)
BUN SERPL-MCNC: 47 MG/DL — HIGH (ref 7–23)
CALCIUM SERPL-MCNC: 9.9 MG/DL — SIGNIFICANT CHANGE UP (ref 8.4–10.5)
CHLORIDE SERPL-SCNC: 101 MMOL/L — SIGNIFICANT CHANGE UP (ref 96–108)
CO2 SERPL-SCNC: 24 MMOL/L — SIGNIFICANT CHANGE UP (ref 22–31)
CREAT SERPL-MCNC: 2.06 MG/DL — HIGH (ref 0.5–1.3)
CULTURE RESULTS: SIGNIFICANT CHANGE UP
GLUCOSE SERPL-MCNC: 93 MG/DL — SIGNIFICANT CHANGE UP (ref 70–99)
HCT VFR BLD CALC: 24.9 % — LOW (ref 39–50)
HGB BLD-MCNC: 8.8 G/DL — LOW (ref 13–17)
LACTATE SERPL-SCNC: 1.3 MMOL/L — SIGNIFICANT CHANGE UP (ref 0.7–2)
MCHC RBC-ENTMCNC: 34.4 PG — HIGH (ref 27–34)
MCHC RBC-ENTMCNC: 35.3 GM/DL — SIGNIFICANT CHANGE UP (ref 32–36)
MCV RBC AUTO: 97.3 FL — SIGNIFICANT CHANGE UP (ref 80–100)
NRBC # BLD: 0 /100 WBCS — SIGNIFICANT CHANGE UP (ref 0–0)
PLATELET # BLD AUTO: 216 K/UL — SIGNIFICANT CHANGE UP (ref 150–400)
POTASSIUM SERPL-MCNC: 4.6 MMOL/L — SIGNIFICANT CHANGE UP (ref 3.5–5.3)
POTASSIUM SERPL-SCNC: 4.6 MMOL/L — SIGNIFICANT CHANGE UP (ref 3.5–5.3)
RBC # BLD: 2.56 M/UL — LOW (ref 4.2–5.8)
RBC # FLD: 15.7 % — HIGH (ref 10.3–14.5)
SODIUM SERPL-SCNC: 136 MMOL/L — SIGNIFICANT CHANGE UP (ref 135–145)
SPECIMEN SOURCE: SIGNIFICANT CHANGE UP
WBC # BLD: 7.77 K/UL — SIGNIFICANT CHANGE UP (ref 3.8–10.5)
WBC # FLD AUTO: 7.77 K/UL — SIGNIFICANT CHANGE UP (ref 3.8–10.5)

## 2020-09-10 PROCEDURE — 99233 SBSQ HOSP IP/OBS HIGH 50: CPT

## 2020-09-10 PROCEDURE — 99232 SBSQ HOSP IP/OBS MODERATE 35: CPT

## 2020-09-10 RX ORDER — GABAPENTIN 400 MG/1
300 CAPSULE ORAL THREE TIMES A DAY
Refills: 0 | Status: DISCONTINUED | OUTPATIENT
Start: 2020-09-10 | End: 2020-09-24

## 2020-09-10 RX ORDER — POLYETHYLENE GLYCOL 3350 17 G/17G
17 POWDER, FOR SOLUTION ORAL
Refills: 0 | Status: DISCONTINUED | OUTPATIENT
Start: 2020-09-10 | End: 2020-09-24

## 2020-09-10 RX ADMIN — Medication 325 MILLIGRAM(S): at 11:46

## 2020-09-10 RX ADMIN — Medication 12.5 MILLIGRAM(S): at 06:31

## 2020-09-10 RX ADMIN — Medication 650 MILLIGRAM(S): at 10:39

## 2020-09-10 RX ADMIN — Medication 650 MILLIGRAM(S): at 11:18

## 2020-09-10 RX ADMIN — PIPERACILLIN AND TAZOBACTAM 25 GRAM(S): 4; .5 INJECTION, POWDER, LYOPHILIZED, FOR SOLUTION INTRAVENOUS at 05:03

## 2020-09-10 RX ADMIN — Medication 12.5 MILLIGRAM(S): at 15:57

## 2020-09-10 RX ADMIN — Medication 1 MILLIGRAM(S): at 11:46

## 2020-09-10 RX ADMIN — Medication 650 MILLIGRAM(S): at 23:29

## 2020-09-10 RX ADMIN — OXYCODONE HYDROCHLORIDE 5 MILLIGRAM(S): 5 TABLET ORAL at 19:45

## 2020-09-10 RX ADMIN — Medication 650 MILLIGRAM(S): at 22:29

## 2020-09-10 RX ADMIN — GABAPENTIN 200 MILLIGRAM(S): 400 CAPSULE ORAL at 06:31

## 2020-09-10 RX ADMIN — PANTOPRAZOLE SODIUM 40 MILLIGRAM(S): 20 TABLET, DELAYED RELEASE ORAL at 06:31

## 2020-09-10 RX ADMIN — Medication 81 MILLIGRAM(S): at 11:46

## 2020-09-10 RX ADMIN — RIVAROXABAN 15 MILLIGRAM(S): KIT at 15:57

## 2020-09-10 RX ADMIN — PIPERACILLIN AND TAZOBACTAM 25 GRAM(S): 4; .5 INJECTION, POWDER, LYOPHILIZED, FOR SOLUTION INTRAVENOUS at 15:26

## 2020-09-10 RX ADMIN — PIPERACILLIN AND TAZOBACTAM 25 GRAM(S): 4; .5 INJECTION, POWDER, LYOPHILIZED, FOR SOLUTION INTRAVENOUS at 22:14

## 2020-09-10 RX ADMIN — SENNA PLUS 2 TABLET(S): 8.6 TABLET ORAL at 22:14

## 2020-09-10 RX ADMIN — POLYETHYLENE GLYCOL 3350 17 GRAM(S): 17 POWDER, FOR SOLUTION ORAL at 11:47

## 2020-09-10 RX ADMIN — OXYCODONE HYDROCHLORIDE 5 MILLIGRAM(S): 5 TABLET ORAL at 20:45

## 2020-09-10 RX ADMIN — GABAPENTIN 300 MILLIGRAM(S): 400 CAPSULE ORAL at 11:47

## 2020-09-10 RX ADMIN — GABAPENTIN 300 MILLIGRAM(S): 400 CAPSULE ORAL at 22:14

## 2020-09-10 RX ADMIN — ATORVASTATIN CALCIUM 40 MILLIGRAM(S): 80 TABLET, FILM COATED ORAL at 22:14

## 2020-09-10 RX ADMIN — Medication 250 MILLIGRAM(S): at 11:48

## 2020-09-10 RX ADMIN — POLYETHYLENE GLYCOL 3350 17 GRAM(S): 17 POWDER, FOR SOLUTION ORAL at 19:44

## 2020-09-10 RX ADMIN — OXYCODONE HYDROCHLORIDE 5 MILLIGRAM(S): 5 TABLET ORAL at 05:00

## 2020-09-10 RX ADMIN — Medication 100 MILLIGRAM(S): at 11:46

## 2020-09-10 RX ADMIN — OXYCODONE HYDROCHLORIDE 5 MILLIGRAM(S): 5 TABLET ORAL at 05:40

## 2020-09-10 NOTE — CONSULT NOTE ADULT - SUBJECTIVE AND OBJECTIVE BOX
HPI:   Patient is a 80y male with a past history of A Fib, PPM, CKD, anemia(macrocytic, heme +),HTN, recently admitted to [facility] with acute onset of left sided weakness on , felt to have a small infarct of possible cardioembolic origin, who has had low grade temps almost since arrival at [facility] on 9/3.  He was cultured  on  and started on zosyn, given vanco load.He has poor dentition and is late on cleaning.He c/o urinary frequency and dysuria x 1 week.He has known BPH.  He had seen a GI doctor PTA, was told of being heme+, had a recent negative colonoscopy and an EGD was being considered.No respiratory complaints.He lives in [place], no one ill at home, no recent travel.? Wt loss at home.He also c/o of neck pain, CT of C spine x 2 with C4-C5 stenosis.His PPM is not MRI compatible.Transthoracic echo at [facility] was without acute pathology of PFO    REVIEW OF SYSTEMS:  All other review of systems negative (Comprehensive ROS)    PAST MEDICAL & SURGICAL HISTORY:  Myositis  Hyperlipidemia  Hypertension  GI bleed  Diverticulitis  No significant past surgical history      Allergies    No Known Allergies    Intolerances        Antimicrobials Day #  :day 1-2  piperacillin/tazobactam IVPB.. 3.375 Gram(s) IV Intermittent every 8 hours  vancomycin  IVPB 750 milliGRAM(s) IV Intermittent every 24 hours    Other Medications:  acetaminophen   Tablet .. 650 milliGRAM(s) Oral every 6 hours PRN  allopurinol 100 milliGRAM(s) Oral daily  aspirin enteric coated 81 milliGRAM(s) Oral daily  atorvastatin 40 milliGRAM(s) Oral at bedtime  bisacodyl Suppository 10 milliGRAM(s) Rectal daily PRN  ferrous    sulfate 325 milliGRAM(s) Oral daily  folic acid 1 milliGRAM(s) Oral daily  gabapentin 300 milliGRAM(s) Oral three times a day  guaiFENesin   Syrup  (Sugar-Free) 100 milliGRAM(s) Oral every 6 hours PRN  influenza   Vaccine 0.5 milliLiter(s) IntraMuscular once  metoprolol tartrate 12.5 milliGRAM(s) Oral two times a day  oxyCODONE    IR 5 milliGRAM(s) Oral every 8 hours PRN  pantoprazole    Tablet 40 milliGRAM(s) Oral before breakfast  polyethylene glycol 3350 17 Gram(s) Oral daily  rivaroxaban 15 milliGRAM(s) Oral with dinner  senna 2 Tablet(s) Oral at bedtime  thiamine 100 milliGRAM(s) Oral daily      FAMILY HISTORY:  Family history of cerebrovascular accident (CVA) (Sibling): Multiple brothers  Family history of heart disease      SOCIAL HISTORY:  Smoking: x    ETOH: yes    Drug Use: x       T(F): 97.9 (09-10-20 @ 07:26), Max: 98.6 (20 @ 20:33)  HR: 68 (09-10-20 @ 07:26)  BP: 123/73 (09-10-20 @ 07:26)  RR: 16 (09-10-20 @ 07:26)  SpO2: 97% (09-10-20 @ 07:26)  Wt(kg): --    PHYSICAL EXAM:  General: alert, no acute distress  Eyes:  anicteric, no conjunctival injection, no discharge  Oropharynx: no lesions or injection , poor dentition	  Neck: supple, without adenopathy  Lungs: clear to auscultation  Heart: regular rate and rhythm; soft murmer  Abdomen: soft, nondistended, nontender, without mass or organomegaly  Skin: no lesions  Extremities: no clubbing, cyanosis, or edema  Neurologic: alert, oriented, moves all extremities, mild left sided weakness    LAB RESULTS:                        8.8    7.77  )-----------( 216      ( 10 Sep 2020 05:30 )             24.9     09-10    136  |  101  |  47<H>  ----------------------------<  93  4.6   |  24  |  2.06<H>    Ca    9.9      10 Sep 2020 05:30    TPro  7.7  /  Alb  3.5  /  TBili  0.4  /  DBili  x   /  AST  16  /  ALT  28  /  AlkPhos  73      LIVER FUNCTIONS - ( 09 Sep 2020 12:00 )  Alb: 3.5 g/dL / Pro: 7.7 g/dL / ALK PHOS: 73 U/L / ALT: 28 U/L / AST: 16 U/L / GGT: x           Urinalysis Basic - ( 08 Sep 2020 21:47 )    Color: Yellow / Appearance: Clear / S.010 / pH: x  Gluc: x / Ketone: Negative  / Bili: Negative / Urobili: Negative   Blood: x / Protein: 15 / Nitrite: Positive   Leuk Esterase: Small / RBC: 0-4 /HPF / WBC 3-5 /HPF   Sq Epi: x / Non Sq Epi: Neg.-Few / Bacteria: Moderate /HPF        MICROBIOLOGY:  RECENT CULTURES:        RADIOLOGY REVIEWED:  < from: Xray Chest 1 View- PORTABLE-Routine (20 @ 15:34) >  IMPRESSION: No active pulmonary disease.    Thank you for this referral.    < end of copied text >  < from: CT Cervical Spine No Cont (20 @ 15:46) >  INTERPRETATION:  CT cervical spine without contrast    Comparison 7 days ago    History neck pain and weakness    There is reversal of cervical lordosis. There is markedly severe multilevel degenerative disc and endplate change with severe facet arthropathy fairly isolated to C2-3 on the right. There is mild degenerative anterolisthesis at C2-3. Dorsal osteophyte contributes to moderate to severe spinal stenosis at C4-5. Residual AP canal dimension is roughly 3 mm. There is mild stenosis at C3-4 and C5-C6 and C6-7. There is severe bilateral foraminal stenosis at C4-5 with less pronounced change elsewhere. There is no osseous destruction or demineralization. A pacemaker is noted.    IMPRESSION:  Severe spondylosis with spinal stenosis, not significantly changed over one week    < end of copied text >  < from: CT Brain Stroke Protocol (20 @ 11:00) >    IMPRESSION:    No hydrocephalus, acute intracranial hemorrhage, mass effect, or brain edema.    < end of copied text >

## 2020-09-10 NOTE — CONSULT NOTE ADULT - ASSESSMENT
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  He has had fevers almost since arrival at [facility].  He is being followed for anemia(CKD, ? alcohol,? heme +) and now has unexplained fever.  He has both neck pain and urgency, slow stream, and frequency although UA does not look purulent an C spine CT x 2 at [facility] negative for infection.  Differential is broad, SBE, occult C spine infection, and  infection all possible.  I am concerned about SBE.  Suggest:  1.Await blood cultures and urine culture  2.Favor repeat TTE  3.Favor CT of A/P to evaluate  tract and r/o occult abscess  4.Favor urology involvement, would check PVR  5.Vanco/zosyn okay for 48 hours pending above

## 2020-09-10 NOTE — PROGRESS NOTE ADULT - SUBJECTIVE AND OBJECTIVE BOX
HPI:  78 yo male PMHx of HTN, CAD s/p stents x 3 (2005), CKD, Afib on Xarelto, s/p Medtronic PPM, myositis, presented to ED 8/30 with left weakness and neck pain. 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 Neurology and neurosurgery consulted. Per neuro evaluation, subcortical CVA suspected, ?right medullary/ pontine but this is uncertain/possibly due to small vessel disease versus cardioembolism related to atrial fibrillation. Unable to confirm with MRI- Pt with PPM that is not compatible with MRI. CTH repeated and stable. TTE EF 65%, no evidence of PFO. Pt with CT spine showing Multilevel cervical spondylosis with severe stenosis at C4-C5. Per Neurosurgery followup with Dr Douglas for aneurysm and C spine stenosis. No surgical intervention at this time. Evaluated by PMR and acute rehab recommended. (03 Sep 2020 15:20)    Patient was seen by me today.   Dr Landry (ortho)  consulted re his persistent neck pain.  Patient states that pain is improved with narcotics but still persistent.   As above, patient has sever stenosis C4-  C5 .  Patient has full  strength bilateral hands.  + sensation .   Neck in soft immobilizer for comfort.  Patient unable to have MRI of neck 2/2 to pacemaker.       Patient c/o occasional buckling with left leg prob due to weakness from right CVA .  Patient denies incontinence, foot drop.   I have discussed with Dr Landry..   His recommendation at this pain would be:                 1,.  Pain management                  2. steroid dose pack or course of steroids                  3.  Ice pack to neck for comfort                  4.  Further f/u with neuro or spine

## 2020-09-10 NOTE — PROGRESS NOTE ADULT - SUBJECTIVE AND OBJECTIVE BOX
HPI:  80 yo male PMHx of HTN, CAD s/p stents x 3 (), CKD, Afib on Xarelto, s/p [employer] PPM, myositis, presented to ED  with left weakness and neck pain. 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 Neurology and neurosurgery consulted. Per neuro evaluation, subcortical CVA suspected, ?right medullary/ pontine but this is uncertain/possibly due to small vessel disease versus cardioembolism related to atrial fibrillation. Unable to confirm with MRI- Pt with PPM that is not compatible with MRI. CTH repeated and stable. TTE EF 65%, no evidence of PFO. Pt with CT spine showing Multilevel cervical spondylosis with severe stenosis at C4-C5. Per Neurosurgery followup with Dr Douglas for aneurysm and C spine stenosis. No surgical intervention at this time. Evaluated by PMR and acute rehab recommended. (03 Sep 2020 15:20)      Subjective     Neck pain same.         PAST MEDICAL & SURGICAL HISTORY:  Myositis  Hyperlipidemia  Hypertension  GI bleed  Diverticulitis  No significant past surgical history      MedsMEDICATIONS  (STANDING):  allopurinol 100 milliGRAM(s) Oral daily  aspirin enteric coated 81 milliGRAM(s) Oral daily  atorvastatin 40 milliGRAM(s) Oral at bedtime  ferrous    sulfate 325 milliGRAM(s) Oral daily  folic acid 1 milliGRAM(s) Oral daily  gabapentin 300 milliGRAM(s) Oral three times a day  influenza   Vaccine 0.5 milliLiter(s) IntraMuscular once  metoprolol tartrate 12.5 milliGRAM(s) Oral two times a day  pantoprazole    Tablet 40 milliGRAM(s) Oral before breakfast  piperacillin/tazobactam IVPB.. 3.375 Gram(s) IV Intermittent every 8 hours  polyethylene glycol 3350 17 Gram(s) Oral daily  rivaroxaban 15 milliGRAM(s) Oral with dinner  senna 2 Tablet(s) Oral at bedtime  thiamine 100 milliGRAM(s) Oral daily  vancomycin  IVPB 750 milliGRAM(s) IV Intermittent every 24 hours    MEDICATIONS  (PRN):  acetaminophen   Tablet .. 650 milliGRAM(s) Oral every 6 hours PRN Temp greater or equal to 38C (100.4F), Mild Pain (1 - 3)  bisacodyl Suppository 10 milliGRAM(s) Rectal daily PRN Constipation  guaiFENesin   Syrup  (Sugar-Free) 100 milliGRAM(s) Oral every 6 hours PRN Cough  oxyCODONE    IR 5 milliGRAM(s) Oral every 8 hours PRN Severe Pain (7 - 10)      Vital Signs Last 24 Hrs  T(C): 36.6 (10 Sep 2020 07:26), Max: 37 (09 Sep 2020 20:33)  T(F): 97.9 (10 Sep 2020 07:), Max: 98.6 (09 Sep 2020 20:33)  HR: 68 (10 Sep 2020 07:) (68 - 82)  BP: 123/73 (10 Sep 2020 07:) (123/73 - 141/92)  BP(mean): --  RR: 16 (10 Sep 2020 07:) (15 - 16)  SpO2: 97% (10 Sep 2020 07:) (97% - 99%)  I&O's Summary    09 Sep 2020 07:01  -  10 Sep 2020 07:00  --------------------------------------------------------  IN: 1250 mL / OUT: 420 mL / NET: 830 mL    10 Sep 2020 07:01  -  10 Sep 2020 12:18  --------------------------------------------------------  IN: 550 mL / OUT: 0 mL / NET: 550 mL        PHYSICAL EXAM:  GENERAL: NAD  NECK: Supple  NERVOUS SYSTEM:  awake and alert  HEART: S1s2 NL , RRR  CHEST/LUNG: Clear to percussion bilaterally  ABDOMEN: Soft, Nontender, Nondistended; Bowel sounds present  EXTREMITIES:  No edema      LABS:(09-10 @ 05:30)                      8.8  7.77 )-----------( 216                 24.9    Neutrophils = -- (--%)  Lymphocytes = -- (--%)  Eosinophils = -- (--%)  Basophils = -- (--%)  Monocytes = -- (--%)  Bands = --%    09-10    136  |  101  |  47<H>  ----------------------------<  93  4.6   |  24  |  2.06<H>    Ca    9.9      10 Sep 2020 05:30    TPro  7.7  /  Alb  3.5  /  TBili  0.4  /  DBili  x   /  AST  16  /  ALT  28  /  AlkPhos  73        Urinalysis Basic - ( 08 Sep 2020 21:47 )    Color: Yellow / Appearance: Clear / S.010 / pH: x  Gluc: x / Ketone: Negative  / Bili: Negative / Urobili: Negative   Blood: x / Protein: 15 / Nitrite: Positive   Leuk Esterase: Small / RBC: 0-4 /HPF / WBC 3-5 /HPF   Sq Epi: x / Non Sq Epi: Neg.-Few / Bacteria: Moderate /HPF      CAPILLARY BLOOD GLUCOSE    CXR-NAD      Imaging Personally Reviewed:  [ ] YES  [ ] NO        Care Discussed with Consultants/Other Providers [ x] YES  [ ] NO      CVA  PT/OT per rehab  ASA/Statin/Xarelto    presyncope  Resolved  PPM interrogation per Card    Anemia  Monitor h/h  Iron  Guaiac pos-GI seen pt    Thrombocytopenia  Resolved    PAF  Xarelto/BB    Neck pain with cervical stenosis not new  Pt declined lidoderm  APAP/gabapentin    CKD3  Baseline Cr unknown but Cr improving    fever   UA neg for UTI(no pyuria)  CXR neg   Lactic acidosis resolved with IVF  Pt had neg fever w/u on  also  Elev PCT. D-dimer neg  cont Zosyn/Vanco for now, if blood c/s neg and remain afebrile ?Dc

## 2020-09-10 NOTE — PROGRESS NOTE ADULT - ATTENDING COMMENTS
Cervical spine stenosis with myelopathic symptoms: pain management ( Neurontin- renal doses, opiates, Tylenol, Lidoderm patches). Post stroke rehabilitation is limited by the symptoms. Cervical CT scan  is reviewed. Will ask Orthopedic evaluation for guidance for post rehab ? spine decompression. Spinal precautions, soft cervical collar    Xarelto/ statin for CVA ppx  Fever, ? UTI on ABX, Medicine is following, awaiting blood cultures, ID input  Neurogenic bladder- will monitor PVR,  input

## 2020-09-10 NOTE — PROGRESS NOTE ADULT - SUBJECTIVE AND OBJECTIVE BOX
CHIEF COMPLAINT: neck pain, urinary frequency, constipation.       HISTORY OF PRESENT ILLNESS  78 yo male PMHx of HTN, CAD s/p stents x 3 (), CKD, Afib on Xarelto, s/p Medtr[employer] PPM, myositis, presented to ED  with left weakness and neck pain. 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 Neurology and neurosurgery consulted. Per neuro evaluation, subcortical CVA suspected, ?right medullary/ pontine but this is uncertain/possibly due to small vessel disease versus cardioembolism related to atrial fibrillation. Unable to confirm with MRI- Pt with PPM that is not compatible with MRI. CTH repeated and stable. TTE EF 65%, no evidence of PFO. Pt with CT spine showing Multilevel cervical spondylosis with severe stenosis at C4-C5. Per Neurosurgery followup with Dr Douglas for aneurysm and C spine stenosis. No surgical intervention at this time. Evaluated by PMR and acute rehab recommended. (03 Sep 2020 15:20)      PAST MEDICAL & SURGICAL HISTORY:  Myositis  Hyperlipidemia  Hypertension  GI bleed  Diverticulitis  No significant past surgical history      VITALS  Vital Signs Last 24 Hrs  T(C): 36.6 (10 Sep 2020 07:26), Max: 37 (09 Sep 2020 20:33)  T(F): 97.9 (10 Sep 2020 07:26), Max: 98.6 (09 Sep 2020 20:33)  HR: 68 (10 Sep 2020 07:26) (68 - 82)  BP: 123/73 (10 Sep 2020 07:26) (123/73 - 141/92)  BP(mean): --  RR: 16 (10 Sep 2020 07:26) (15 - 16)  SpO2: 97% (10 Sep 2020 07:26) (97% - 99%)      PHYSICAL EXAM  Constitutional - In bed, limiting movement 2/2 neck pain  HEENT - EOMI  Neck - Supple, limited ROM 2/2 pain  Chest - CTA bilaterally  Cardiovascular - RR  Abdomen - BS+, Soft, NTND  Extremities - No C/C/E, No calf tenderness   Skin-no rash      Neurologic Exam - Awake, Alert, without new deficits.     Balance - impaired     Psychiatric - Mood stable, Affect WNL       FUNCTIONAL PROGRESS  Gait - 40ft RW min/mod A  ADLs - min A/CG A  Transfers - min A/CG  Functional transfer - mod A    RECENT LABS                        8.8    7.77  )-----------( 216      ( 10 Sep 2020 05:30 )             24.9     09-10    136  |  101  |  47<H>  ----------------------------<  93  4.6   |  24  |  2.06<H>    Ca    9.9      10 Sep 2020 05:30    TPro  7.7  /  Alb  3.5  /  TBili  0.4  /  DBili  x   /  AST  16  /  ALT  28  /  AlkPhos  73  09      LIVER FUNCTIONS - ( 09 Sep 2020 12:00 )  Alb: 3.5 g/dL / Pro: 7.7 g/dL / ALK PHOS: 73 U/L / ALT: 28 U/L / AST: 16 U/L / GGT: x           Urinalysis Basic - ( 08 Sep 2020 21:47 )    Color: Yellow / Appearance: Clear / S.010 / pH: x  Gluc: x / Ketone: Negative  / Bili: Negative / Urobili: Negative   Blood: x / Protein: 15 / Nitrite: Positive   Leuk Esterase: Small / RBC: 0-4 /HPF / WBC 3-5 /HPF   Sq Epi: x / Non Sq Epi: Neg.-Few / Bacteria: Moderate /HPF      Direct LDL: 85 mg/dL (20 @ 06:52)                RADIOLOGY/OTHER RESULTS      CURRENT MEDICATIONS  MEDICATIONS  (STANDING):  allopurinol 100 milliGRAM(s) Oral daily  aspirin enteric coated 81 milliGRAM(s) Oral daily  atorvastatin 40 milliGRAM(s) Oral at bedtime  ferrous    sulfate 325 milliGRAM(s) Oral daily  folic acid 1 milliGRAM(s) Oral daily  gabapentin 200 milliGRAM(s) Oral three times a day  influenza   Vaccine 0.5 milliLiter(s) IntraMuscular once  metoprolol tartrate 12.5 milliGRAM(s) Oral two times a day  pantoprazole    Tablet 40 milliGRAM(s) Oral before breakfast  piperacillin/tazobactam IVPB.. 3.375 Gram(s) IV Intermittent every 8 hours  polyethylene glycol 3350 17 Gram(s) Oral daily  rivaroxaban 15 milliGRAM(s) Oral with dinner  senna 2 Tablet(s) Oral at bedtime  thiamine 100 milliGRAM(s) Oral daily  vancomycin  IVPB 750 milliGRAM(s) IV Intermittent every 24 hours    MEDICATIONS  (PRN):  acetaminophen   Tablet .. 650 milliGRAM(s) Oral every 6 hours PRN Temp greater or equal to 38C (100.4F), Mild Pain (1 - 3)  bisacodyl Suppository 10 milliGRAM(s) Rectal daily PRN Constipation  guaiFENesin   Syrup  (Sugar-Free) 100 milliGRAM(s) Oral every 6 hours PRN Cough  oxyCODONE    IR 5 milliGRAM(s) Oral every 8 hours PRN Severe Pain (7 - 10)      ASSESSMENT & PLAN    GI/Bowel Management - Miralax, Senna, Prunes, Dulcolax as per request. GI clearance to continue AC obtained.   Management - Toilet Q2, UA neg,  eval for frequency, check PVRs TID  Skin - Turn Q2  Pain - Tylenol PRN, Oxycodone with bowel regimen, Neurontin TID  DVT PPX - Xarelto continues  Diet - reg    Continue comprehensive acute rehab program consisting of 3hrs/day of OT/PT and SLP.

## 2020-09-11 DIAGNOSIS — N40.1 BENIGN PROSTATIC HYPERPLASIA WITH LOWER URINARY TRACT SYMPTOMS: ICD-10-CM

## 2020-09-11 PROCEDURE — 74176 CT ABD & PELVIS W/O CONTRAST: CPT | Mod: 26

## 2020-09-11 PROCEDURE — 99232 SBSQ HOSP IP/OBS MODERATE 35: CPT

## 2020-09-11 PROCEDURE — 99233 SBSQ HOSP IP/OBS HIGH 50: CPT

## 2020-09-11 RX ORDER — TAMSULOSIN HYDROCHLORIDE 0.4 MG/1
0.4 CAPSULE ORAL AT BEDTIME
Refills: 0 | Status: DISCONTINUED | OUTPATIENT
Start: 2020-09-11 | End: 2020-09-24

## 2020-09-11 RX ADMIN — Medication 325 MILLIGRAM(S): at 12:33

## 2020-09-11 RX ADMIN — POLYETHYLENE GLYCOL 3350 17 GRAM(S): 17 POWDER, FOR SOLUTION ORAL at 06:28

## 2020-09-11 RX ADMIN — PIPERACILLIN AND TAZOBACTAM 25 GRAM(S): 4; .5 INJECTION, POWDER, LYOPHILIZED, FOR SOLUTION INTRAVENOUS at 06:27

## 2020-09-11 RX ADMIN — Medication 100 MILLIGRAM(S): at 12:33

## 2020-09-11 RX ADMIN — Medication 10 MILLIGRAM(S): at 21:34

## 2020-09-11 RX ADMIN — POLYETHYLENE GLYCOL 3350 17 GRAM(S): 17 POWDER, FOR SOLUTION ORAL at 18:25

## 2020-09-11 RX ADMIN — Medication 12.5 MILLIGRAM(S): at 06:27

## 2020-09-11 RX ADMIN — SENNA PLUS 2 TABLET(S): 8.6 TABLET ORAL at 21:34

## 2020-09-11 RX ADMIN — Medication 12.5 MILLIGRAM(S): at 18:25

## 2020-09-11 RX ADMIN — RIVAROXABAN 15 MILLIGRAM(S): KIT at 18:25

## 2020-09-11 RX ADMIN — GABAPENTIN 300 MILLIGRAM(S): 400 CAPSULE ORAL at 06:27

## 2020-09-11 RX ADMIN — PANTOPRAZOLE SODIUM 40 MILLIGRAM(S): 20 TABLET, DELAYED RELEASE ORAL at 06:27

## 2020-09-11 RX ADMIN — OXYCODONE HYDROCHLORIDE 5 MILLIGRAM(S): 5 TABLET ORAL at 06:27

## 2020-09-11 RX ADMIN — Medication 81 MILLIGRAM(S): at 12:33

## 2020-09-11 RX ADMIN — Medication 1 MILLIGRAM(S): at 12:33

## 2020-09-11 RX ADMIN — Medication 100 MILLIGRAM(S): at 12:34

## 2020-09-11 RX ADMIN — PIPERACILLIN AND TAZOBACTAM 25 GRAM(S): 4; .5 INJECTION, POWDER, LYOPHILIZED, FOR SOLUTION INTRAVENOUS at 21:35

## 2020-09-11 RX ADMIN — PIPERACILLIN AND TAZOBACTAM 25 GRAM(S): 4; .5 INJECTION, POWDER, LYOPHILIZED, FOR SOLUTION INTRAVENOUS at 15:02

## 2020-09-11 RX ADMIN — TAMSULOSIN HYDROCHLORIDE 0.4 MILLIGRAM(S): 0.4 CAPSULE ORAL at 21:34

## 2020-09-11 RX ADMIN — GABAPENTIN 300 MILLIGRAM(S): 400 CAPSULE ORAL at 21:34

## 2020-09-11 RX ADMIN — Medication 250 MILLIGRAM(S): at 13:55

## 2020-09-11 RX ADMIN — OXYCODONE HYDROCHLORIDE 5 MILLIGRAM(S): 5 TABLET ORAL at 07:27

## 2020-09-11 RX ADMIN — Medication 650 MILLIGRAM(S): at 21:35

## 2020-09-11 RX ADMIN — Medication 650 MILLIGRAM(S): at 22:35

## 2020-09-11 RX ADMIN — ATORVASTATIN CALCIUM 40 MILLIGRAM(S): 80 TABLET, FILM COATED ORAL at 21:34

## 2020-09-11 RX ADMIN — GABAPENTIN 300 MILLIGRAM(S): 400 CAPSULE ORAL at 13:55

## 2020-09-11 NOTE — PROGRESS NOTE ADULT - ASSESSMENT
79-year-old gentleman with history of hypertension, coronary artery disease, chronic kidney disease, atrial fibrillation on Xarelto, who presented to the emergency department 8/30/20 with left-sided weakness and neck pain. 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  Patient is now on the acute rehabilitation unit in [facility].  Hematology consulted for anemia/thrombocytopenia. Of note, patient has been under the hematologic care of Dr. Uribe for his anemia. He also reports history of a bone marrow evaluation years ago (no known diagnosis given). He stated he has a history of intermittently guaiac-positive stools (most recently last month), and is under the gastrointestinal care of Dr. Olivo. Plans are for eventual followup endoscopic evaluation.

## 2020-09-11 NOTE — PROGRESS NOTE ADULT - SUBJECTIVE AND OBJECTIVE BOX
HPI:  78 yo male PMHx of HTN, CAD s/p stents x 3 (2005), CKD, Afib on Xarelto, s/p Medtronic PPM, myositis, presented to ED 8/30 with left weakness and neck pain. 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 Neurology and neurosurgery consulted. Per neuro evaluation, subcortical CVA suspected, ?right medullary/ pontine but this is uncertain/possibly due to small vessel disease versus cardioembolism related to atrial fibrillation. Unable to confirm with MRI- Pt with PPM that is not compatible with MRI. CTH repeated and stable. TTE EF 65%, no evidence of PFO. Pt with CT spine showing Multilevel cervical spondylosis with severe stenosis at C4-C5. Per Neurosurgery followup with Dr Douglas for aneurysm and C spine stenosis. No surgical intervention at this time. Evaluated by PMR and acute rehab recommended. (03 Sep 2020 15:20)      Subjective    c/o neck apin      PAST MEDICAL & SURGICAL HISTORY:  Myositis  Hyperlipidemia  Hypertension  GI bleed  Diverticulitis  No significant past surgical history      MedsMEDICATIONS  (STANDING):  allopurinol 100 milliGRAM(s) Oral daily  aspirin enteric coated 81 milliGRAM(s) Oral daily  atorvastatin 40 milliGRAM(s) Oral at bedtime  ferrous    sulfate 325 milliGRAM(s) Oral daily  folic acid 1 milliGRAM(s) Oral daily  gabapentin 300 milliGRAM(s) Oral three times a day  influenza   Vaccine 0.5 milliLiter(s) IntraMuscular once  metoprolol tartrate 12.5 milliGRAM(s) Oral two times a day  pantoprazole    Tablet 40 milliGRAM(s) Oral before breakfast  piperacillin/tazobactam IVPB.. 3.375 Gram(s) IV Intermittent every 8 hours  polyethylene glycol 3350 17 Gram(s) Oral two times a day  rivaroxaban 15 milliGRAM(s) Oral with dinner  senna 2 Tablet(s) Oral at bedtime  tamsulosin 0.4 milliGRAM(s) Oral at bedtime  thiamine 100 milliGRAM(s) Oral daily  vancomycin  IVPB 750 milliGRAM(s) IV Intermittent every 24 hours    MEDICATIONS  (PRN):  acetaminophen   Tablet .. 650 milliGRAM(s) Oral every 6 hours PRN Temp greater or equal to 38C (100.4F), Mild Pain (1 - 3)  bisacodyl Suppository 10 milliGRAM(s) Rectal daily PRN Constipation  guaiFENesin   Syrup  (Sugar-Free) 100 milliGRAM(s) Oral every 6 hours PRN Cough  oxyCODONE    IR 5 milliGRAM(s) Oral every 8 hours PRN Severe Pain (7 - 10)      Vital Signs Last 24 Hrs  T(C): 36.8 (10 Sep 2020 19:47), Max: 36.8 (10 Sep 2020 19:47)  T(F): 98.3 (10 Sep 2020 19:47), Max: 98.3 (10 Sep 2020 19:47)  HR: 67 (11 Sep 2020 06:31) (67 - 82)  BP: 137/80 (11 Sep 2020 06:31) (122/76 - 137/80)  BP(mean): --  RR: 15 (10 Sep 2020 19:47) (15 - 15)  SpO2: 99% (10 Sep 2020 19:47) (99% - 99%)  I&O's Summary    10 Sep 2020 07:01  -  11 Sep 2020 07:00  --------------------------------------------------------  IN: 1110 mL / OUT: 1120 mL / NET: -10 mL        PHYSICAL EXAM:  GENERAL: NAD  NECK: Supple  NERVOUS SYSTEM:  awake and alert  HEART: S1s2 NL , RRR  CHEST/LUNG: Clear to percussion bilaterally  ABDOMEN: Soft, Nontender, Nondistended; Bowel sounds present  EXTREMITIES:  No edema      LABS:(09-10 @ 05:30)                      8.8  7.77 )-----------( 216                 24.9    Neutrophils = -- (--%)  Lymphocytes = -- (--%)  Eosinophils = -- (--%)  Basophils = -- (--%)  Monocytes = -- (--%)  Bands = --%    09-10    136  |  101  |  47<H>  ----------------------------<  93  4.6   |  24  |  2.06<H>    Ca    9.9      10 Sep 2020 05:30            Culture - Blood (collected 09 Sep 2020 12:00)  Source: .Blood Blood-Peripheral  Preliminary Report (10 Sep 2020 17:01):    No growth to date.    Culture - Blood (collected 09 Sep 2020 12:00)  Source: .Blood Blood-Peripheral  Preliminary Report (10 Sep 2020 17:01):    No growth to date.    Culture - Urine (collected 09 Sep 2020 02:32)  Source: .Urine Clean Catch (Midstream)  Final Report (10 Sep 2020 18:03):    >100,000 CFU/ml Staphylococcus epidermidis    "Susceptibilities not performed"      Imaging Personally Reviewed:  [ ] YES  [ ] NO        Care Discussed with Consultants/Other Providers [ x] YES  [ ] NO    CVA  PT/OT per rehab  ASA/Statin/Xarelto    presyncope  Resolved  PPM interrogation per Card    Anemia  Monitor h/h  Iron  Guaiac pos-GI seen pt    Thrombocytopenia  Resolved    PAF  Xarelto/BB    Neck pain with cervical stenosis not new  Pt declined lidoderm  APAP/gabapentin  ortho following    CKD3  Baseline Cr unknown but Cr improving    fever 9/8  UA neg for UTI(no pyuria). urine c/s staph likely contaminant  CXR neg   Lactic acidosis resolved with IVF  Pt had neg fever w/u on 9/4 also  Elev PCT. D-dimer neg  cont Zosyn/Vanco for now  EHCo/CT abd pelvis-per ID

## 2020-09-11 NOTE — PROGRESS NOTE ADULT - SUBJECTIVE AND OBJECTIVE BOX
CHIEF COMPLAINT: Urinary frequency. Neck pain.       HISTORY OF PRESENT ILLNESS  80 yo male PMHx of HTN, CAD s/p stents x 3 (2005), CKD, Afib on Xarelto, s/p Medtr[employer] PPM, myositis, presented to ED 8/30 with left weakness and neck pain. 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 Neurology and neurosurgery consulted. Per neuro evaluation, subcortical CVA suspected, ?right medullary/ pontine but this is uncertain/possibly due to small vessel disease versus cardioembolism related to atrial fibrillation. Unable to confirm with MRI- Pt with PPM that is not compatible with MRI. CTH repeated and stable. TTE EF 65%, no evidence of PFO. Pt with CT spine showing Multilevel cervical spondylosis with severe stenosis at C4-C5. Per Neurosurgery followup with Dr Douglas for aneurysm and C spine stenosis. No surgical intervention at this time. Evaluated by PMR and acute rehab recommended. (03 Sep 2020 15:20)      PAST MEDICAL & SURGICAL HISTORY:  Myositis  Hyperlipidemia  Hypertension  GI bleed  Diverticulitis  No significant past surgical history      VITALS  Vital Signs Last 24 Hrs  T(C): 36.8 (10 Sep 2020 19:47), Max: 36.8 (10 Sep 2020 19:47)  T(F): 98.3 (10 Sep 2020 19:47), Max: 98.3 (10 Sep 2020 19:47)  HR: 67 (11 Sep 2020 06:31) (67 - 82)  BP: 137/80 (11 Sep 2020 06:31) (122/76 - 137/80)  BP(mean): --  RR: 15 (10 Sep 2020 19:47) (15 - 15)  SpO2: 99% (10 Sep 2020 19:47) (99% - 99%)    PHYSICAL EXAM  Constitutional - In bed, limiting movement 2/2 neck pain  HEENT - EOMI  Neck - Supple, limited ROM 2/2 pain  Chest - CTA bilaterally  Cardiovascular - RR  Abdomen - BS+, Soft, NTND  Extremities - No C/C/E, No calf tenderness   Skin-no rash      Neurologic Exam - Awake, Alert, without new deficits.     Balance - impaired     Psychiatric - Mood stable, Affect WNL       FUNCTIONAL PROGRESS  Gait - 40ft RW min/mod A  ADLs - min A/CG A  Transfers - min A/CG  Functional transfer - mod A      RECENT LABS                        8.8    7.77  )-----------( 216      ( 10 Sep 2020 05:30 )             24.9     09-10    136  |  101  |  47<H>  ----------------------------<  93  4.6   |  24  |  2.06<H>    Ca    9.9      10 Sep 2020 05:30    TPro  7.7  /  Alb  3.5  /  TBili  0.4  /  DBili  x   /  AST  16  /  ALT  28  /  AlkPhos  73  09-09      LIVER FUNCTIONS - ( 09 Sep 2020 12:00 )  Alb: 3.5 g/dL / Pro: 7.7 g/dL / ALK PHOS: 73 U/L / ALT: 28 U/L / AST: 16 U/L / GGT: x             Direct LDL: 85 mg/dL (08-31-20 @ 06:52)                RADIOLOGY/OTHER RESULTS      CURRENT MEDICATIONS  MEDICATIONS  (STANDING):  allopurinol 100 milliGRAM(s) Oral daily  aspirin enteric coated 81 milliGRAM(s) Oral daily  atorvastatin 40 milliGRAM(s) Oral at bedtime  ferrous    sulfate 325 milliGRAM(s) Oral daily  folic acid 1 milliGRAM(s) Oral daily  gabapentin 300 milliGRAM(s) Oral three times a day  influenza   Vaccine 0.5 milliLiter(s) IntraMuscular once  metoprolol tartrate 12.5 milliGRAM(s) Oral two times a day  pantoprazole    Tablet 40 milliGRAM(s) Oral before breakfast  piperacillin/tazobactam IVPB.. 3.375 Gram(s) IV Intermittent every 8 hours  polyethylene glycol 3350 17 Gram(s) Oral two times a day  rivaroxaban 15 milliGRAM(s) Oral with dinner  senna 2 Tablet(s) Oral at bedtime  tamsulosin 0.4 milliGRAM(s) Oral at bedtime  thiamine 100 milliGRAM(s) Oral daily  vancomycin  IVPB 750 milliGRAM(s) IV Intermittent every 24 hours    MEDICATIONS  (PRN):  acetaminophen   Tablet .. 650 milliGRAM(s) Oral every 6 hours PRN Temp greater or equal to 38C (100.4F), Mild Pain (1 - 3)  bisacodyl Suppository 10 milliGRAM(s) Rectal daily PRN Constipation  guaiFENesin   Syrup  (Sugar-Free) 100 milliGRAM(s) Oral every 6 hours PRN Cough  oxyCODONE    IR 5 milliGRAM(s) Oral every 8 hours PRN Severe Pain (7 - 10)      ASSESSMENT & PLAN    GI/Bowel Management - Miralax, Senna, Prunes, Dulcolax as per request. GI clearance to continue AC obtained.   Management - Toilet Q2, UA neg, CS with staph epiderm+.  eval for frequency completed-Flomax, PVRs [id number] overnight.  Skin - Turn Q2  Pain - Tylenol PRN, Oxycodone with bowel regimen, Neurontin TID. Ortho eval completed.   DVT PPX - Xarelto continues  Diet - reg    Continue comprehensive acute rehab program consisting of 3hrs/day of OT/PT and SLP.

## 2020-09-11 NOTE — PROGRESS NOTE ADULT - ASSESSMENT
EVERARDO FIGUEROA is a 78yo Male with possible right sided subcortical CVA with left sided weakness and with decreased functional mobility, gait instability and ADL impairments.    - COMORBIDITES/ACTIVE MEDICAL ISSUES     Gait Instability, ADL impairments and Functional impairments:  Comprehensive Rehab Program of PT/OT/SLP     #CVA  - PT/OT/SLP continues. Activities limited by neck pain. Neurontin increased to 300mg TID.   -Xarelto for Hx Afib, Guaiac+ on 9/8, no overt bleeding noted otherwise. Cleared by GI for Xarelto to continue. Outpt follow up with Dr Olivo.  -ASA for CAD-no chest pain reported. PPM interrogated.   - Lipitor for goal LDL < 70  - GI ppx with Protonix       #Hx Unruptured aneurysm  -outpt follow up Neurosurgery  -no headache, awake and alert    #HTN  -Lopressor BID to 12.5mg as per hospitalist plan. Cardiology evaluation for hx recurrent pre-syncope. PPM interrogated.    #Anemia/thrombocytopenia  -s/p PRBC x 1 unit, on Xarelto, no overt bleeding reported. Guaiac+ noted.   -labs following  -hematology following    #Neck pain/ Cervical spine stenosis   - Neurontin increased to 300mg TID.   - Tylenol PRN, oxycodone prn with bowel regimen. Warm compress to neck ok.  - follow up CT Cspine-completed.   - Neurosurgical follow as outpatient   -Orthopedic Surgery eval completed-?medrol pack and modalities recommended.     #Fever  -Low grade temps with elevated Lactate on 9/9, resolved on abx. UA neg, CS+.   -ID follow up today.       #Urinary frequency  -pronounced at night, pt reports urge every 20min. Follow up PVR TID.   -Flomax added by  for BPH.       GI/Bowel Mgmt - Senna,  Miralax, Dulcolax  /Bladder Mgmt - Voiding independently, PVRx1 and low      Precautions / PROPHYLAXIS:   - Falls, Cardiac, Seizure   - Ortho: Weight bearing status: WBAT   - Lungs: Aspiration, Incentive Spirometer   - Pressure injury/Skin: Turn Q2hrs while in bed, OOB to Chair, PT/OT    - DVT: Xarelto EVERARDO FIGUEROA is a 80yo Male with possible right sided subcortical CVA with left sided weakness and with decreased functional mobility, gait instability and ADL impairments.    - COMORBIDITES/ACTIVE MEDICAL ISSUES     Gait Instability, ADL impairments and Functional impairments:  Comprehensive Rehab Program of PT/OT/SLP     #CVA  - PT/OT/SLP continues. Activities limited by neck pain. Neurontin increased to 300mg TID.   -Xarelto for Hx Afib, Guaiac+ on 9/8, no overt bleeding noted otherwise. Cleared by GI for Xarelto to continue. Outpt follow up with Dr Olivo.  -ASA for CAD-no chest pain reported. PPM interrogated.   - Lipitor for goal LDL < 70  - GI ppx with Protonix       #Hx Unruptured aneurysm  -outpt follow up Neurosurgery  -no headache, awake and alert    #HTN  -Lopressor BID to 12.5mg as per hospitalist plan. Cardiology evaluation for hx recurrent pre-syncope. PPM interrogated.    #Anemia/thrombocytopenia  -s/p PRBC x 1 unit, on Xarelto, no overt bleeding reported. Guaiac+ noted.   -labs following  -hematology following    #Neck pain/ Cervical spine stenosis   - Neurontin increased to 300mg TID.   - Tylenol PRN, oxycodone prn with bowel regimen. Warm compress to neck ok.  - follow up CT Cspine-completed.   - Neurosurgical follow as outpatient   -Orthopedic Surgery eval completed-?medrol pack and modalities recommended. Discussed with ID on 9/11-hold steroid until infectious workup completed.    #Fever  -Low grade temps with elevated Lactate on 9/9, resolved on abx. UA neg, CS+.   -ID follow up today.       #Urinary frequency  -pronounced at night, pt reports urge every 20min. Follow up PVR TID.   -Flomax added by  for BPH.       GI/Bowel Mgmt - Senna,  Miralax, Dulcolax  /Bladder Mgmt - Voiding independently, PVRx1 and low      Precautions / PROPHYLAXIS:   - Falls, Cardiac, Seizure   - Ortho: Weight bearing status: WBAT   - Lungs: Aspiration, Incentive Spirometer   - Pressure injury/Skin: Turn Q2hrs while in bed, OOB to Chair, PT/OT    - DVT: Xarelto

## 2020-09-11 NOTE — PROGRESS NOTE ADULT - SUBJECTIVE AND OBJECTIVE BOX
EVERARDO FIGUEROA   80y   Male    Admitting: MANUEL Llanes  HPI:  79-year-old gentleman with history of hypertension, coronary artery disease, chronic kidney disease, atrial fibrillation on Xarelto, who presented to the emergency department 8/30/20 with left-sided weakness and neck pain. 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  Patient is now on the acute rehabilitation unit in [facility].  Hematology consulted for anemia/thrombocytopenia. Of note, patient has been under the hematologic care of Dr. Uribe for his anemia. He also reports history of a bone marrow evaluation years ago (no known diagnosis given). He stated he has a history of intermittently guaiac-positive stools (most recently last month), and is under the gastrointestinal care of Dr. Olivo. Plans were for eventual followup endoscopic evaluation.    PAST MEDICAL & SURGICAL HISTORY:  Myositis  Hyperlipidemia  Hypertension  GI bleed  Diverticulitis    HEALTH ISSUES - PROBLEM Dx:  BPH with urinary obstruction: BPH with urinary obstruction  Thrombocytopenia: Thrombocytopenia  Anemia, unspecified type: Anemia, unspecified type    MEDICATIONS  (STANDING):  allopurinol 100 milliGRAM(s) Oral daily  aspirin enteric coated 81 milliGRAM(s) Oral daily  atorvastatin 40 milliGRAM(s) Oral at bedtime  ferrous    sulfate 325 milliGRAM(s) Oral daily  folic acid 1 milliGRAM(s) Oral daily  gabapentin 300 milliGRAM(s) Oral three times a day  influenza   Vaccine 0.5 milliLiter(s) IntraMuscular once  metoprolol tartrate 12.5 milliGRAM(s) Oral two times a day  pantoprazole    Tablet 40 milliGRAM(s) Oral before breakfast  piperacillin/tazobactam IVPB.. 3.375 Gram(s) IV Intermittent every 8 hours  polyethylene glycol 3350 17 Gram(s) Oral two times a day  rivaroxaban 15 milliGRAM(s) Oral with dinner  senna 2 Tablet(s) Oral at bedtime  tamsulosin 0.4 milliGRAM(s) Oral at bedtime  thiamine 100 milliGRAM(s) Oral daily  vancomycin  IVPB 750 milliGRAM(s) IV Intermittent every 24 hours    MEDICATIONS  (PRN):  acetaminophen   Tablet .. 650 milliGRAM(s) Oral every 6 hours PRN Temp greater or equal to 38C (100.4F), Mild Pain (1 - 3)  bisacodyl Suppository 10 milliGRAM(s) Rectal daily PRN Constipation  guaiFENesin   Syrup  (Sugar-Free) 100 milliGRAM(s) Oral every 6 hours PRN Cough  oxyCODONE    IR 5 milliGRAM(s) Oral every 8 hours PRN Severe Pain (7 - 10)    Allergies    No Known Allergies    INTERVAL HPI/OVERNIGHT EVENTS:  Patient S&E at bedside. No c/o CP or SOB.     VITAL SIGNS:  T(F): 98.3 (09-10-20 @ 19:47)  HR: 67 (09-11-20 @ 06:31)  BP: 137/80 (09-11-20 @ 06:31)  RR: 15 (09-10-20 @ 19:47)  SpO2: 99% (09-10-20 @ 19:47)    PHYSICAL EXAM:  Constitutional: NAD  Eyes: sclera non-icteric  Neck: soft collar in place  Respiratory: CTA b/l, good air entry b/l ant.  Cardiovascular: RRR, no M/R/G  Gastrointestinal: soft, NTND, no masses palpable, no hepatosplenomegaly  Extremities: no calf tenderness  Neurological: Awake, alert.    Labs:             8.8    7.77  )-----------( 216      ( 09-10 @ 05:30 )             24.9                9.5    7.69  )-----------( 219      ( 09-09 @ 05:00 )             27.1       09-10    136  |  101  |  47<H>  ----------------------------<  93  4.6   |  24  |  2.06<H>    Ca    9.9      10 Sep 2020 05:30

## 2020-09-11 NOTE — CONSULT NOTE ADULT - REASON FOR ADMISSION
1.2 s/p right sided cva with left sided weakness

## 2020-09-11 NOTE — PROGRESS NOTE ADULT - ASSESSMENT
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  He has had fevers almost since arrival at [facility].  He is being followed for anemia(CKD, ? alcohol,? heme +) and now has unexplained fever.  He has both neck pain and urgency, slow stream, and frequency although UA does not look purulent an C spine CT x 2 at [facility] negative for infection.  Differential is broad, SBE, occult C spine infection, and  infection all possible.  flow sheets reviewed and the patient is currently afebrile   white count is normal range   blood cultures are no growth   ucx reports staph epi which is likely colonized bacteria   Fever work up is in process     Plan   day # 3 of empiric antibiotics Vancomycin and Zosyn   follow up cultures   TTE and CT A/P have been ordered, follow up results once the test are  complete   reviewed case and treatment plan with Rehab NP on the floor

## 2020-09-11 NOTE — CONSULT NOTE ADULT - CONSULT REQUESTED DATE/TIME
04-Sep-2020 12:38
09-Sep-2020 14:41
10-Sep-2020 15:38
11-Sep-2020 07:58
04-Sep-2020 17:32
08-Sep-2020 14:03

## 2020-09-11 NOTE — PROGRESS NOTE ADULT - SUBJECTIVE AND OBJECTIVE BOX
CC: f/u for fever     Patient is awake and alert he is sitting up, fever have resolved     REVIEW OF SYSTEMS:  All other review of systems negative (Comprehensive ROS)      T(F): 98.3 (09-10-20 @ 19:47), Max: 98.3 (09-10-20 @ 19:47)  HR: 67 (09-11-20 @ 06:31)  BP: 137/80 (09-11-20 @ 06:31)  RR: 15 (09-10-20 @ 19:47)  SpO2: 99% (09-10-20 @ 19:47)  Wt(kg): --    PHYSICAL EXAM:  General: alert, no acute distress  Eyes:  anicteric, no conjunctival injection, no discharge  Lungs: clear to auscultation  Heart: regular rate and rhythm; no murmur, rubs or gallops  Abdomen: soft, nondistended, nontender  Skin: no lesions  Extremities: no clubbing, cyanosis, or edema  Neurologic: alert, oriented     LAB RESULTS:                        8.8    7.77  )-----------( 216      ( 10 Sep 2020 05:30 )             24.9     09-10    136  |  101  |  47<H>  ----------------------------<  93  4.6   |  24  |  2.06<H>    Ca    9.9      10 Sep 2020 05:30          MICROBIOLOGY:  RECENT CULTURES:  09-09 @ 12:00 .Blood Blood-Peripheral     No growth to date.      09-09 @ 02:32 .Urine Clean Catch (Midstream)     >100,000 CFU/ml Staphylococcus epidermidis  "Susceptibilities not performed"          RADIOLOGY REVIEWED:        Antimicrobials Day #    piperacillin/tazobactam IVPB.. 3.375 Gram(s) IV Intermittent every 8 hours  vancomycin  IVPB 750 milliGRAM(s) IV Intermittent every 24 hours    Other Medications Reviewed

## 2020-09-11 NOTE — CONSULT NOTE ADULT - SUBJECTIVE AND OBJECTIVE BOX
Patient is a 80y old  Male who presents with a chief complaint of 1.2 s/p right sided cva with left sided weakness (10 Sep 2020 19:12)      HPI:  78 yo male PMHx of HTN, CAD s/p stents x 3 (2005), CKD, Afib on Xarelto, s/p Medtronic PPM, myositis, presented to ED 8/30 with left weakness and neck pain. 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 Neurology and neurosurgery consulted. Per neuro evaluation, subcortical CVA suspected, ?right medullary/ pontine but this is uncertain/possibly due to small vessel disease versus cardioembolism related to atrial fibrillation. Unable to confirm with MRI- Pt with PPM that is not compatible with MRI. CTH repeated and stable. TTE EF 65%, no evidence of PFO. Pt with CT spine showing Multilevel cervical spondylosis with severe stenosis at C4-C5. Per Neurosurgery followup with Dr Douglas for aneurysm and C spine stenosis. No surgical intervention at this time. Evaluated by PMR and acute rehab recommended. (03 Sep 2020 15:20)    Followed by urologist Dr. Lilly for BPH frequency, now with impaired bladder emptying      PAST MEDICAL & SURGICAL HISTORY:  Myositis  Hyperlipidemia  Hypertension  GI bleed  Diverticulitis  No significant past surgical history      REVIEW OF SYSTEMS:    CONSTITUTIONAL:  no fever or chills  HEENT: No visual changes  ENDO: No sweating  NECK: No pain or stiffness  MUSCULOSKELETAL: No back pain, no joint pain  RESPIRATORY: No shortness of breath  CARDIOVASCULAR: No chest pain  GASTROINTESTINAL: No abdominal or epigastric pain. No nausea, vomiting,  No diarrhea or constipation.   NEUROLOGICAL: No mental status changes  PSYCH: No depression, no mood changes  SKIN: No itching      MEDICATIONS  (STANDING):  allopurinol 100 milliGRAM(s) Oral daily  aspirin enteric coated 81 milliGRAM(s) Oral daily  atorvastatin 40 milliGRAM(s) Oral at bedtime  ferrous    sulfate 325 milliGRAM(s) Oral daily  folic acid 1 milliGRAM(s) Oral daily  gabapentin 300 milliGRAM(s) Oral three times a day  influenza   Vaccine 0.5 milliLiter(s) IntraMuscular once  metoprolol tartrate 12.5 milliGRAM(s) Oral two times a day  pantoprazole    Tablet 40 milliGRAM(s) Oral before breakfast  piperacillin/tazobactam IVPB.. 3.375 Gram(s) IV Intermittent every 8 hours  polyethylene glycol 3350 17 Gram(s) Oral two times a day  rivaroxaban 15 milliGRAM(s) Oral with dinner  senna 2 Tablet(s) Oral at bedtime  tamsulosin 0.4 milliGRAM(s) Oral at bedtime  thiamine 100 milliGRAM(s) Oral daily  vancomycin  IVPB 750 milliGRAM(s) IV Intermittent every 24 hours    MEDICATIONS  (PRN):  acetaminophen   Tablet .. 650 milliGRAM(s) Oral every 6 hours PRN Temp greater or equal to 38C (100.4F), Mild Pain (1 - 3)  bisacodyl Suppository 10 milliGRAM(s) Rectal daily PRN Constipation  guaiFENesin   Syrup  (Sugar-Free) 100 milliGRAM(s) Oral every 6 hours PRN Cough  oxyCODONE    IR 5 milliGRAM(s) Oral every 8 hours PRN Severe Pain (7 - 10)      Allergies    No Known Allergies    Intolerances        SOCIAL HISTORY: No illicit drug use    FAMILY HISTORY:  Family history of cerebrovascular accident (CVA) (Sibling): Multiple brothers  Family history of heart disease        T(C): 36.8 (09-10-20 @ 19:47), Max: 37 (09-09-20 @ 20:33)  T(F): 98.3 (09-10-20 @ 19:47), Max: 98.6 (09-09-20 @ 20:33)  HR: 67 (09-11-20 @ 06:31) (67 - 82)  BP: 137/80 (09-11-20 @ 06:31) (122/76 - 141/92)  RR: 15 (09-10-20 @ 19:47) (15 - 16)  SpO2: 99% (09-10-20 @ 19:47) (97% - 99%)      PHYSICAL EXAM:    Constitutional: alert, no acute distress  Back: No CVA tenderness  Respiratory: No accessory respiratory muscle use  Abd: Soft, NT/ND  no organomegaly  no hernia  : yes bladder distention, penis/testes benign  Extremities: no edema  Neurological: A/O x 3  Psychiatric: Normal mood, normal affect  Skin: No rashes      09-10-20 @ 07:01  -  09-11-20 @ 07:00  --------------------------------------------------------  IN:  Total IN: 0 mL    OUT:    Intermittent Catheterization - Urethral: 400 mL  Total OUT: 400 mL    Total NET: -400 mL        09-10-20 @ 07:01  -  09-11-20 @ 07:00  --------------------------------------------------------  IN: 1110 mL / OUT: 1120 mL / NET: -10 mL            LABS:                        8.8    7.77  )-----------( 216      ( 10 Sep 2020 05:30 )             24.9               Culture - Blood (collected 09-09-20 @ 12:00)  Source: .Blood Blood-Peripheral  Preliminary Report (09-10-20 @ 17:01):    No growth to date.    Culture - Blood (collected 09-09-20 @ 12:00)  Source: .Blood Blood-Peripheral  Preliminary Report (09-10-20 @ 17:01):    No growth to date.    Culture - Urine (collected 09-09-20 @ 02:32)  Source: .Urine Clean Catch (Midstream)  Final Report (09-10-20 @ 18:03):    >100,000 CFU/ml Staphylococcus epidermidis    "Susceptibilities not performed"        RADIOLOGY & ADDITIONAL STUDIES:

## 2020-09-12 PROCEDURE — 93306 TTE W/DOPPLER COMPLETE: CPT | Mod: 26

## 2020-09-12 PROCEDURE — 99232 SBSQ HOSP IP/OBS MODERATE 35: CPT

## 2020-09-12 RX ORDER — LACTULOSE 10 G/15ML
20 SOLUTION ORAL ONCE
Refills: 0 | Status: COMPLETED | OUTPATIENT
Start: 2020-09-12 | End: 2020-09-12

## 2020-09-12 RX ADMIN — Medication 12.5 MILLIGRAM(S): at 06:00

## 2020-09-12 RX ADMIN — PIPERACILLIN AND TAZOBACTAM 25 GRAM(S): 4; .5 INJECTION, POWDER, LYOPHILIZED, FOR SOLUTION INTRAVENOUS at 06:00

## 2020-09-12 RX ADMIN — TAMSULOSIN HYDROCHLORIDE 0.4 MILLIGRAM(S): 0.4 CAPSULE ORAL at 22:15

## 2020-09-12 RX ADMIN — Medication 650 MILLIGRAM(S): at 20:31

## 2020-09-12 RX ADMIN — Medication 81 MILLIGRAM(S): at 12:39

## 2020-09-12 RX ADMIN — PIPERACILLIN AND TAZOBACTAM 25 GRAM(S): 4; .5 INJECTION, POWDER, LYOPHILIZED, FOR SOLUTION INTRAVENOUS at 22:15

## 2020-09-12 RX ADMIN — RIVAROXABAN 15 MILLIGRAM(S): KIT at 18:06

## 2020-09-12 RX ADMIN — GABAPENTIN 300 MILLIGRAM(S): 400 CAPSULE ORAL at 06:00

## 2020-09-12 RX ADMIN — Medication 12.5 MILLIGRAM(S): at 18:05

## 2020-09-12 RX ADMIN — POLYETHYLENE GLYCOL 3350 17 GRAM(S): 17 POWDER, FOR SOLUTION ORAL at 06:00

## 2020-09-12 RX ADMIN — Medication 100 MILLIGRAM(S): at 12:41

## 2020-09-12 RX ADMIN — Medication 24 MILLIGRAM(S): at 13:42

## 2020-09-12 RX ADMIN — Medication 650 MILLIGRAM(S): at 21:31

## 2020-09-12 RX ADMIN — Medication 10 MILLIGRAM(S): at 12:39

## 2020-09-12 RX ADMIN — GABAPENTIN 300 MILLIGRAM(S): 400 CAPSULE ORAL at 22:15

## 2020-09-12 RX ADMIN — LACTULOSE 20 GRAM(S): 10 SOLUTION ORAL at 13:43

## 2020-09-12 RX ADMIN — Medication 250 MILLIGRAM(S): at 13:42

## 2020-09-12 RX ADMIN — Medication 1 MILLIGRAM(S): at 12:41

## 2020-09-12 RX ADMIN — SENNA PLUS 2 TABLET(S): 8.6 TABLET ORAL at 22:15

## 2020-09-12 RX ADMIN — PANTOPRAZOLE SODIUM 40 MILLIGRAM(S): 20 TABLET, DELAYED RELEASE ORAL at 07:00

## 2020-09-12 RX ADMIN — Medication 100 MILLIGRAM(S): at 12:39

## 2020-09-12 RX ADMIN — PIPERACILLIN AND TAZOBACTAM 25 GRAM(S): 4; .5 INJECTION, POWDER, LYOPHILIZED, FOR SOLUTION INTRAVENOUS at 14:58

## 2020-09-12 RX ADMIN — ATORVASTATIN CALCIUM 40 MILLIGRAM(S): 80 TABLET, FILM COATED ORAL at 22:15

## 2020-09-12 RX ADMIN — Medication 325 MILLIGRAM(S): at 12:40

## 2020-09-12 RX ADMIN — GABAPENTIN 300 MILLIGRAM(S): 400 CAPSULE ORAL at 13:42

## 2020-09-12 NOTE — PROGRESS NOTE ADULT - SUBJECTIVE AND OBJECTIVE BOX
Subjective: No fevers,  Neck and back pain-- controlled with PRN meds.  Has been tolerating therapy.       REVIEW OF SYSTEMS  Pertinent in last 24 h: Neurological deficits    VITALS  T(C): 36.8 (09-11-20 @ 19:22), Max: 36.8 (09-11-20 @ 19:22)  HR: 77 (09-11-20 @ 19:22) (77 - 81)  BP: 136/79 (09-11-20 @ 19:22) (136/79 - 137/75)  RR: 15 (09-11-20 @ 19:22) (15 - 15)  SpO2: 98% (09-11-20 @ 19:22) (98% - 98%)  Wt(kg): --    Physical Exam:  Constitutional - NAD, Comfortable  HEENT - NCAT, EOMI  Chest - CTA bilaterally,   Cardiovascular - RRR, S1S2,   Abdomen - BS+, Soft, NTND  Extremities - No edema,   Neurologic Exam -    Stable                Cognitive - Awake, Alert, AAO to self, place, date, year, situation     Cranial Nerves - CN 2-12 intact     Motor - no new deficit     Psychiatric - Mood stable, Affect WNL  -    RECENT LABS/IMAGING                  MEDICATIONS   acetaminophen   Tablet .. 650 milliGRAM(s) every 6 hours PRN  allopurinol 100 milliGRAM(s) daily  aspirin enteric coated 81 milliGRAM(s) daily  atorvastatin 40 milliGRAM(s) at bedtime  bisacodyl Suppository 10 milliGRAM(s) daily  ferrous    sulfate 325 milliGRAM(s) daily  folic acid 1 milliGRAM(s) daily  gabapentin 300 milliGRAM(s) three times a day  guaiFENesin   Syrup  (Sugar-Free) 100 milliGRAM(s) every 6 hours PRN  influenza   Vaccine 0.5 milliLiter(s) once  lactulose Syrup 20 Gram(s) once  methylPREDNISolone     methylPREDNISolone 24 milliGRAM(s) once  metoprolol tartrate 12.5 milliGRAM(s) two times a day  oxyCODONE    IR 5 milliGRAM(s) every 8 hours PRN  pantoprazole    Tablet 40 milliGRAM(s) before breakfast  piperacillin/tazobactam IVPB.. 3.375 Gram(s) every 8 hours  polyethylene glycol 3350 17 Gram(s) two times a day  rivaroxaban 15 milliGRAM(s) with dinner  senna 2 Tablet(s) at bedtime  tamsulosin 0.4 milliGRAM(s) at bedtime  thiamine 100 milliGRAM(s) daily  vancomycin  IVPB 750 milliGRAM(s) every 24 hours      --------------------------------------------------------------------       Subjective: No fevers,  Neck and back pain-- controlled with PRN meds.  Has been tolerating therapy.   ECHO done this AM      REVIEW OF SYSTEMS  Pertinent in last 24 h: Neurological deficits  neck pain      VITALS  T(C): 36.8 (09-11-20 @ 19:22), Max: 36.8 (09-11-20 @ 19:22)  HR: 77 (09-11-20 @ 19:22) (77 - 81)  BP: 136/79 (09-11-20 @ 19:22) (136/79 - 137/75)  RR: 15 (09-11-20 @ 19:22) (15 - 15)  SpO2: 98% (09-11-20 @ 19:22) (98% - 98%)  Wt(kg): --    Physical Exam:  Constitutional - NAD, Comfortable  HEENT - NCAT, EOMI  Chest - CTA bilaterally,   Cardiovascular - RRR, S1S2,   Abdomen - BS+, Soft, NTND  Extremities - No edema,   Neurologic Exam -    Stable                Cognitive - Awake, Alert, AAO to self, place, date, year, situation     Cranial Nerves - CN 2-12 intact     Motor - no new deficit     Psychiatric - Mood stable, Affect WNL  -    RECENT LABS/IMAGING                  MEDICATIONS   acetaminophen   Tablet .. 650 milliGRAM(s) every 6 hours PRN  allopurinol 100 milliGRAM(s) daily  aspirin enteric coated 81 milliGRAM(s) daily  atorvastatin 40 milliGRAM(s) at bedtime  bisacodyl Suppository 10 milliGRAM(s) daily  ferrous    sulfate 325 milliGRAM(s) daily  folic acid 1 milliGRAM(s) daily  gabapentin 300 milliGRAM(s) three times a day  guaiFENesin   Syrup  (Sugar-Free) 100 milliGRAM(s) every 6 hours PRN  influenza   Vaccine 0.5 milliLiter(s) once  lactulose Syrup 20 Gram(s) once  methylPREDNISolone     methylPREDNISolone 24 milliGRAM(s) once  metoprolol tartrate 12.5 milliGRAM(s) two times a day  oxyCODONE    IR 5 milliGRAM(s) every 8 hours PRN  pantoprazole    Tablet 40 milliGRAM(s) before breakfast  piperacillin/tazobactam IVPB.. 3.375 Gram(s) every 8 hours  polyethylene glycol 3350 17 Gram(s) two times a day  rivaroxaban 15 milliGRAM(s) with dinner  senna 2 Tablet(s) at bedtime  tamsulosin 0.4 milliGRAM(s) at bedtime  thiamine 100 milliGRAM(s) daily  vancomycin  IVPB 750 milliGRAM(s) every 24 hours      --------------------------------------------------------------------   [FreeTextEntry1] : Patient  with history of hypertension, hyperlipidemia and coronary artery disease.\par  status post stent placement 2006  LCX /2009 RCA .  LAD 9/2017 10/2017 , Intermediate artery  feb 2018 , who came for follow up says he is doing well , claimes he had some chest discomfort 2 months ago  while he was walking  in park , resolved when he continued to walk  no shortness of breath , did not recurs , \par \par denies any other  symptoms , patient recent blood work  glucose 163  non fasting , HbA1c 6.3 ,normal lipid profile \par \par Patient blood pressure is controlled , hyperlipidemia controlled \par \par \par  \par

## 2020-09-12 NOTE — PROGRESS NOTE ADULT - ASSESSMENT
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  He has had fevers almost since arrival at [facility].  He is being followed for anemia(CKD, ? alcohol,? heme +) and now has unexplained fever.  He has both neck pain and urgency, slow stream, and frequency although UA does not look purulent an C spine CT x 2 at [facility] negative for infection.  Differential is broad, SBE, occult C spine infection, and  infection all possible.  flow sheets reviewed and the patient is currently afebrile   white count is normal range   blood cultures are no growth   ucx reports staph epi which is likely colonized bacteria TTE  CT A/P done: moderated fecal retention and fluid distended stomach   To date no clear source of infection to explain prior fevers, his fever have resolved     Plan   day # 4  of 5 empiric antibiotics Vancomycin and Zosyn   at this point would limit antibiotics to total of 5 days  reviewed case and treatment plan with MD on floor

## 2020-09-12 NOTE — PROGRESS NOTE ADULT - SUBJECTIVE AND OBJECTIVE BOX
CC: f/u for fever     Patient is awake and alert, his fevers have resolved     REVIEW OF SYSTEMS:  All other review of systems negative (Comprehensive ROS)      T(F): 98.3 (09-10-20 @ 19:47), Max: 98.3 (09-10-20 @ 19:47)  HR: 67 (09-11-20 @ 06:31)  BP: 137/80 (09-11-20 @ 06:31)  RR: 15 (09-10-20 @ 19:47)  SpO2: 99% (09-10-20 @ 19:47)  Wt(kg): --    PHYSICAL EXAM:  General: alert, no acute distress  Eyes:  anicteric, no conjunctival injection, no discharge  Lungs: clear to auscultation  Heart: regular rate and rhythm; no murmur, rubs or gallops  Abdomen: soft, nondistended, nontender  Skin: no lesions  Extremities: no clubbing, cyanosis, or edema  Neurologic: alert, oriented     LAB RESULTS:             no new cbc for today              8.8    7.77  )-----------( 216      ( 10 Sep 2020 05:30 )             24.9         no new bmp for today       MICROBIOLOGY:  RECENT CULTURES:  09-09 @ 12:00 .Blood Blood-Peripheral     No growth to date.      09-09 @ 02:32 .Urine Clean Catch (Midstream)     >100,000 CFU/ml Staphylococcus epidermidis  "Susceptibilities not performed"          RADIOLOGY REVIEWED:        Antimicrobials Day #    piperacillin/tazobactam IVPB.. 3.375 Gram(s) IV Intermittent every 8 hours  vancomycin  IVPB 750 milliGRAM(s) IV Intermittent every 24 hours    Other Medications Reviewed

## 2020-09-12 NOTE — PROGRESS NOTE ADULT - ASSESSMENT
79 yo M  with PMH of Afib, CAD, CKD, HTN admitted to acute Rehabilitation with and Subcortical infarct and cervical spondylosis    Pt. Stable  Active Issues/Medication changes:    ID and Hospitalist    Cont. comprehensive inpatient PT, OT and Speech    No acute issues,   Cont. current plan of care and medications as per primary team   81 yo M  with PMH of Afib, CAD, CKD, HTN admitted to acute Rehabilitation with and Subcortical infarct and cervical spondylosis    Pt. Stable  Active Issues/Medication changes    ID and Hospitalist  notes reviewed.  fevers resolved-- on vanco and zosyn.  no clear source of fever.  Cont. IV antibiotics for 1 more day as per ID  ECHO neg for endocarditiis    Pain controlled with current regimen.     Cont. comprehensive inpatient PT, OT and Speech    No acute issues,   Cont. current plan of care and medications as per primary team   81 yo M  with PMH of Afib, CAD, CKD, HTN admitted to acute Rehabilitation with and Subcortical infarct and cervical spondylosis    Pt. Stable  Active Issues/Medication changes    ID and Hospitalist  notes reviewed.  fevers resolved-- on vanco and zosyn.  no clear source of fever.  Cont. IV antibiotics for 1 more day as per ID  ECHO neg for endocarditiis    Neck Pain --pt. had been on Medrol dose pack but was held for fever--   Hospitalist restarted as fevers resolved and no apparent infection    Cont. comprehensive inpatient PT, OT and Speech    No acute issues,   Cont. current plan of care and medications as per primary team

## 2020-09-12 NOTE — PROGRESS NOTE ADULT - SUBJECTIVE AND OBJECTIVE BOX
HPI:  80 yo male PMHx of HTN, CAD s/p stents x 3 (2005), CKD, Afib on Xarelto, s/p Medtronic PPM, myositis, presented to ED 8/30 with left weakness and neck pain. 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 Neurology and neurosurgery consulted. Per neuro evaluation, subcortical CVA suspected, ?right medullary/ pontine but this is uncertain/possibly due to small vessel disease versus cardioembolism related to atrial fibrillation. Unable to confirm with MRI- Pt with PPM that is not compatible with MRI. CTH repeated and stable. TTE EF 65%, no evidence of PFO. Pt with CT spine showing Multilevel cervical spondylosis with severe stenosis at C4-C5. Per Neurosurgery followup with Dr Douglas for aneurysm and C spine stenosis. No surgical intervention at this time. Evaluated by PMR and acute rehab recommended. (03 Sep 2020 15:20)      Subjective   pos neck pain.   Pos constipation 3~4 days        PAST MEDICAL & SURGICAL HISTORY:  Myositis    Hyperlipidemia    Hypertension    GI bleed    Diverticulitis    No significant past surgical history        MedsMEDICATIONS  (STANDING):  allopurinol 100 milliGRAM(s) Oral daily  aspirin enteric coated 81 milliGRAM(s) Oral daily  atorvastatin 40 milliGRAM(s) Oral at bedtime  bisacodyl Suppository 10 milliGRAM(s) Rectal daily  ferrous    sulfate 325 milliGRAM(s) Oral daily  folic acid 1 milliGRAM(s) Oral daily  gabapentin 300 milliGRAM(s) Oral three times a day  influenza   Vaccine 0.5 milliLiter(s) IntraMuscular once  metoprolol tartrate 12.5 milliGRAM(s) Oral two times a day  pantoprazole    Tablet 40 milliGRAM(s) Oral before breakfast  piperacillin/tazobactam IVPB.. 3.375 Gram(s) IV Intermittent every 8 hours  polyethylene glycol 3350 17 Gram(s) Oral two times a day  rivaroxaban 15 milliGRAM(s) Oral with dinner  senna 2 Tablet(s) Oral at bedtime  tamsulosin 0.4 milliGRAM(s) Oral at bedtime  thiamine 100 milliGRAM(s) Oral daily  vancomycin  IVPB 750 milliGRAM(s) IV Intermittent every 24 hours    MEDICATIONS  (PRN):  acetaminophen   Tablet .. 650 milliGRAM(s) Oral every 6 hours PRN Temp greater or equal to 38C (100.4F), Mild Pain (1 - 3)  guaiFENesin   Syrup  (Sugar-Free) 100 milliGRAM(s) Oral every 6 hours PRN Cough  oxyCODONE    IR 5 milliGRAM(s) Oral every 8 hours PRN Severe Pain (7 - 10)      Vital Signs Last 24 Hrs  T(C): 36.8 (11 Sep 2020 19:22), Max: 36.8 (11 Sep 2020 19:22)  T(F): 98.2 (11 Sep 2020 19:22), Max: 98.2 (11 Sep 2020 19:22)  HR: 77 (11 Sep 2020 19:22) (77 - 81)  BP: 136/79 (11 Sep 2020 19:22) (136/79 - 137/75)  BP(mean): --  RR: 15 (11 Sep 2020 19:22) (15 - 15)  SpO2: 98% (11 Sep 2020 19:22) (98% - 98%)  I&O's Summary    11 Sep 2020 07:01  -  12 Sep 2020 07:00  --------------------------------------------------------  IN: 0 mL / OUT: 688 mL / NET: -688 mL        PHYSICAL EXAM:  GENERAL: NAD  NECK: Supple  NERVOUS SYSTEM:  awake and alert  HEART: S1s2 NL , RRR  CHEST/LUNG: Clear to percussion bilaterally  ABDOMEN: Soft, Nontender, Nondistended; Bowel sounds present  EXTREMITIES:  No edema    Imaging Personally Reviewed:  [ ] YES  [ ] NO        Care Discussed with Consultants/Other Providers [ x] YES  [ ] NO    PT/OT per rehab  ASA/Statin/Xarelto    presyncope  Resolved    Anemia  Monitor h/h  Iron  Guaiac pos-GI seen pt    Thrombocytopenia  Resolved    PAF  Xarelto/BB    Neck pain with cervical stenosis not new  Pt declined lidoderm  APAP/gabapentin  ortho following-will try medrol dose pack    CKD3  Baseline Cr unknown  Monitor for now    fever 9/8 night  UA neg for UTI(no pyuria). urine c/s staph likely contaminant  CXR neg   Lactic acidosis resolved with IVF  Pt had neg fever w/u on 9/4 also  Elev PCT. D-dimer neg  cont Zosyn/Vanco for now-Day #4. DC abx after 5 days  ECHO and CT abd neg for infection    Constipation  Bowel regimen

## 2020-09-13 PROCEDURE — 99232 SBSQ HOSP IP/OBS MODERATE 35: CPT

## 2020-09-13 PROCEDURE — 99233 SBSQ HOSP IP/OBS HIGH 50: CPT

## 2020-09-13 RX ADMIN — Medication 4 MILLIGRAM(S): at 17:56

## 2020-09-13 RX ADMIN — GABAPENTIN 300 MILLIGRAM(S): 400 CAPSULE ORAL at 06:34

## 2020-09-13 RX ADMIN — Medication 12.5 MILLIGRAM(S): at 06:34

## 2020-09-13 RX ADMIN — GABAPENTIN 300 MILLIGRAM(S): 400 CAPSULE ORAL at 22:57

## 2020-09-13 RX ADMIN — Medication 250 MILLIGRAM(S): at 13:30

## 2020-09-13 RX ADMIN — PIPERACILLIN AND TAZOBACTAM 25 GRAM(S): 4; .5 INJECTION, POWDER, LYOPHILIZED, FOR SOLUTION INTRAVENOUS at 22:56

## 2020-09-13 RX ADMIN — ATORVASTATIN CALCIUM 40 MILLIGRAM(S): 80 TABLET, FILM COATED ORAL at 22:57

## 2020-09-13 RX ADMIN — Medication 100 MILLIGRAM(S): at 12:50

## 2020-09-13 RX ADMIN — RIVAROXABAN 15 MILLIGRAM(S): KIT at 17:56

## 2020-09-13 RX ADMIN — Medication 12.5 MILLIGRAM(S): at 17:56

## 2020-09-13 RX ADMIN — PANTOPRAZOLE SODIUM 40 MILLIGRAM(S): 20 TABLET, DELAYED RELEASE ORAL at 06:34

## 2020-09-13 RX ADMIN — Medication 4 MILLIGRAM(S): at 13:30

## 2020-09-13 RX ADMIN — Medication 81 MILLIGRAM(S): at 12:50

## 2020-09-13 RX ADMIN — PIPERACILLIN AND TAZOBACTAM 25 GRAM(S): 4; .5 INJECTION, POWDER, LYOPHILIZED, FOR SOLUTION INTRAVENOUS at 14:41

## 2020-09-13 RX ADMIN — Medication 8 MILLIGRAM(S): at 22:56

## 2020-09-13 RX ADMIN — Medication 4 MILLIGRAM(S): at 08:17

## 2020-09-13 RX ADMIN — Medication 1 MILLIGRAM(S): at 12:51

## 2020-09-13 RX ADMIN — OXYCODONE HYDROCHLORIDE 5 MILLIGRAM(S): 5 TABLET ORAL at 20:10

## 2020-09-13 RX ADMIN — OXYCODONE HYDROCHLORIDE 5 MILLIGRAM(S): 5 TABLET ORAL at 21:01

## 2020-09-13 RX ADMIN — Medication 325 MILLIGRAM(S): at 12:51

## 2020-09-13 RX ADMIN — Medication 100 MILLIGRAM(S): at 12:51

## 2020-09-13 RX ADMIN — TAMSULOSIN HYDROCHLORIDE 0.4 MILLIGRAM(S): 0.4 CAPSULE ORAL at 22:57

## 2020-09-13 RX ADMIN — GABAPENTIN 300 MILLIGRAM(S): 400 CAPSULE ORAL at 13:30

## 2020-09-13 RX ADMIN — PIPERACILLIN AND TAZOBACTAM 25 GRAM(S): 4; .5 INJECTION, POWDER, LYOPHILIZED, FOR SOLUTION INTRAVENOUS at 06:33

## 2020-09-13 NOTE — PROGRESS NOTE ADULT - SUBJECTIVE AND OBJECTIVE BOX
CC: f/u for fever     Patient is awake and alert, his fevers have resolved, patient reports that he had bowel movements    REVIEW OF SYSTEMS:  All other review of systems negative (Comprehensive ROS)      Vital Signs Last 24 Hrs  T(C): 36.4 (13 Sep 2020 08:19), Max: 36.9 (12 Sep 2020 20:35)  T(F): 97.6 (13 Sep 2020 08:19), Max: 98.4 (12 Sep 2020 20:35)  HR: 73 (13 Sep 2020 08:19) (73 - 84)  BP: 144/79 (13 Sep 2020 08:19) (133/82 - 152/87)  BP(mean): --  RR: 14 (13 Sep 2020 08:19) (14 - 14)  SpO2: 100% (13 Sep 2020 08:19) (99% - 100%)    PHYSICAL EXAM:  General: alert, no acute distress  Eyes:  anicteric, no conjunctival injection, no discharge  Lungs: clear to auscultation  Heart: regular rate and rhythm; no murmur, rubs or gallops  Abdomen: soft, nondistended, nontender  Skin: no lesions  Extremities: no clubbing, cyanosis, or edema  Neurologic: alert, oriented     LAB RESULTS:                 8.8    7.77  )-----------( 216      ( 10 Sep 2020 05:30 )             24.9         no new bmp for today       MICROBIOLOGY:  RECENT CULTURES:  09-09 @ 12:00 .Blood Blood-Peripheral     No growth to date.      09-09 @ 02:32 .Urine Clean Catch (Midstream)     >100,000 CFU/ml Staphylococcus epidermidis  "Susceptibilities not performed"          RADIOLOGY REVIEWED:        Antimicrobials Day #    piperacillin/tazobactam IVPB.. 3.375 Gram(s) IV Intermittent every 8 hours  vancomycin  IVPB 750 milliGRAM(s) IV Intermittent every 24 hours    Other Medications Reviewed

## 2020-09-13 NOTE — PROGRESS NOTE ADULT - ASSESSMENT
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  He has had fevers almost since arrival at [facility].  He is being followed for anemia(CKD, ? alcohol,? heme +) and now has unexplained fever.  He has both neck pain and urgency, slow stream, and frequency although UA does not look purulent an C spine CT x 2 at [facility] negative for infection.  Differential is broad, SBE, occult C spine infection, and  infection all possible.  flow sheets reviewed and the patient is currently afebrile   white count is normal range   blood cultures are no growth   ucx reports staph epi which is likely colonized bacteria TTE  CT A/P done: moderated fecal retention and fluid distended stomach, the patient reports that he had been having several bowel movements   To date no clear source of infection to explain prior fevers, his fever have resolved     Plan   day # 5  of 5 empiric antibiotics Vancomycin and Zosyn   last day of empiric antibiotics   continue supportive care per Hospitalist and Rehab MD

## 2020-09-13 NOTE — PROGRESS NOTE ADULT - SUBJECTIVE AND OBJECTIVE BOX
Subjective: Neck pain improved.  Reports back pain is more bothersome now.  Pain meds and repositioning help.        REVIEW OF SYSTEMS  Pertinent in last 24 h: Neurological deficits  neck pain    VITALS  T(C): 36.4 (09-13-20 @ 08:19), Max: 36.9 (09-12-20 @ 20:35)  HR: 73 (09-13-20 @ 08:19) (73 - 84)  BP: 144/79 (09-13-20 @ 08:19) (133/82 - 152/87)  RR: 14 (09-13-20 @ 08:19) (14 - 14)  SpO2: 100% (09-13-20 @ 08:19) (99% - 100%)  Wt(kg): --    Physical Exam:  Constitutional - NAD, Comfortable  HEENT - NCAT, EOMI  Neck--soft collar  Chest - CTA bilaterally,   Cardiovascular - RRR, S1S2,   Abdomen - BS+, Soft, NTND  Extremities - No edema,   Neurologic Exam -    Stable                Cognitive - Awake, Alert, AAO to self, place, date, year, situation     Cranial Nerves - CN 2-12 intact     Motor - no new deficit     Psychiatric - Mood stable, Affect WNL    -    RECENT LABS/IMAGING                  MEDICATIONS   acetaminophen   Tablet .. 650 milliGRAM(s) every 6 hours PRN  allopurinol 100 milliGRAM(s) daily  aspirin enteric coated 81 milliGRAM(s) daily  atorvastatin 40 milliGRAM(s) at bedtime  bisacodyl Suppository 10 milliGRAM(s) daily  ferrous    sulfate 325 milliGRAM(s) daily  folic acid 1 milliGRAM(s) daily  gabapentin 300 milliGRAM(s) three times a day  guaiFENesin   Syrup  (Sugar-Free) 100 milliGRAM(s) every 6 hours PRN  influenza   Vaccine 0.5 milliLiter(s) once  methylPREDNISolone     metoprolol tartrate 12.5 milliGRAM(s) two times a day  oxyCODONE    IR 5 milliGRAM(s) every 8 hours PRN  pantoprazole    Tablet 40 milliGRAM(s) before breakfast  piperacillin/tazobactam IVPB.. 3.375 Gram(s) every 8 hours  polyethylene glycol 3350 17 Gram(s) two times a day  rivaroxaban 15 milliGRAM(s) with dinner  senna 2 Tablet(s) at bedtime  tamsulosin 0.4 milliGRAM(s) at bedtime  thiamine 100 milliGRAM(s) daily  vancomycin  IVPB 750 milliGRAM(s) every 24 hours      --------------------------------------------------------------------

## 2020-09-13 NOTE — PROGRESS NOTE ADULT - SUBJECTIVE AND OBJECTIVE BOX
HPI:  78 yo male PMHx of HTN, CAD s/p stents x 3 (2005), CKD, Afib on Xarelto, s/p Med[employer] PPM, myositis, presented to ED 8/30 with left weakness and neck pain. 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 Neurology and neurosurgery consulted. Per neuro evaluation, subcortical CVA suspected, ?right medullary/ pontine but this is uncertain/possibly due to small vessel disease versus cardioembolism related to atrial fibrillation. Unable to confirm with MRI- Pt with PPM that is not compatible with MRI. CTH repeated and stable. TTE EF 65%, no evidence of PFO. Pt with CT spine showing Multilevel cervical spondylosis with severe stenosis at C4-C5. Per Neurosurgery followup with Dr Douglas for aneurysm and C spine stenosis. No surgical intervention at this time. Evaluated by PMR and acute rehab recommended. (03 Sep 2020 15:20)      Subjective    Neck pain little better. pos low back pain.   Had BM.         PAST MEDICAL & SURGICAL HISTORY:  Myositis    Hyperlipidemia    Hypertension    GI bleed    Diverticulitis    No significant past surgical history        MedsMEDICATIONS  (STANDING):  allopurinol 100 milliGRAM(s) Oral daily  aspirin enteric coated 81 milliGRAM(s) Oral daily  atorvastatin 40 milliGRAM(s) Oral at bedtime  bisacodyl Suppository 10 milliGRAM(s) Rectal daily  ferrous    sulfate 325 milliGRAM(s) Oral daily  folic acid 1 milliGRAM(s) Oral daily  gabapentin 300 milliGRAM(s) Oral three times a day  influenza   Vaccine 0.5 milliLiter(s) IntraMuscular once  methylPREDNISolone   Oral   metoprolol tartrate 12.5 milliGRAM(s) Oral two times a day  pantoprazole    Tablet 40 milliGRAM(s) Oral before breakfast  piperacillin/tazobactam IVPB.. 3.375 Gram(s) IV Intermittent every 8 hours  polyethylene glycol 3350 17 Gram(s) Oral two times a day  rivaroxaban 15 milliGRAM(s) Oral with dinner  senna 2 Tablet(s) Oral at bedtime  tamsulosin 0.4 milliGRAM(s) Oral at bedtime  thiamine 100 milliGRAM(s) Oral daily  vancomycin  IVPB 750 milliGRAM(s) IV Intermittent every 24 hours    MEDICATIONS  (PRN):  acetaminophen   Tablet .. 650 milliGRAM(s) Oral every 6 hours PRN Temp greater or equal to 38C (100.4F), Mild Pain (1 - 3)  guaiFENesin   Syrup  (Sugar-Free) 100 milliGRAM(s) Oral every 6 hours PRN Cough  oxyCODONE    IR 5 milliGRAM(s) Oral every 8 hours PRN Severe Pain (7 - 10)      Vital Signs Last 24 Hrs  T(C): 36.4 (13 Sep 2020 08:19), Max: 36.9 (12 Sep 2020 20:35)  T(F): 97.6 (13 Sep 2020 08:19), Max: 98.4 (12 Sep 2020 20:35)  HR: 73 (13 Sep 2020 08:19) (73 - 84)  BP: 144/79 (13 Sep 2020 08:19) (133/82 - 152/87)  BP(mean): --  RR: 14 (13 Sep 2020 08:19) (14 - 14)  SpO2: 100% (13 Sep 2020 08:19) (99% - 100%)  I&O's Summary      PHYSICAL EXAM:  GENERAL: NAD  NECK: Supple  NERVOUS SYSTEM:  awake and alert  HEART: S1s2 NL , RRR  CHEST/LUNG: Clear to percussion bilaterally  ABDOMEN: Soft, Nontender, Nondistended; Bowel sounds present  EXTREMITIES:  No edema      Care Discussed with Consultants/Other Providers [ x] YES  [ ] NO      PT/OT per rehab  ASA/Statin/Xarelto    presyncope  Resolved    Anemia  Monitor h/h  Iron  Guaiac pos-GI seen pt    Thrombocytopenia  Resolved    PAF  Xarelto/BB    Neck pain with cervical stenosis not new  Pt declined lidoderm  APAP/gabapentin  Medrol dose pack    CKD3  Baseline Cr unknown  Monitor for now    fever 9/8 night  UA neg for UTI(no pyuria). urine c/s staph likely contaminant  CXR neg   Lactic acidosis resolved with IVF  Pt had neg fever w/u on 9/4 also  Elev PCT. D-dimer neg  cont Zosyn/Vanco for now-Day #5. DC abx after today  ECHO and CT abd neg for infection    Constipation  Pos BM

## 2020-09-13 NOTE — PROGRESS NOTE ADULT - ASSESSMENT
81 yo M  with PMH of Afib, CAD, CKD, HTN admitted to acute Rehabilitation with and Subcortical infarct and cervical spondylosis    Pt. Stable  Active Issues/Medication changes    ID and Hospitalist  notes reviewed.  fevers resolved-- on vanco and zosyn.  no clear source of fever.  last day of IV antibiotics today    Neck Pain --started Medrol dose pack --neck pain improved    Low back pain-- cont. tylenol and oxycodone PRN, Pt. declined lidoderm patch    Cont. comprehensive inpatient PT, OT and Speech    No acute issues,   Cont. current plan of care and medications as per primary team

## 2020-09-14 LAB
ALBUMIN SERPL ELPH-MCNC: 3 G/DL — LOW (ref 3.3–5)
ALP SERPL-CCNC: 56 U/L — SIGNIFICANT CHANGE UP (ref 40–120)
ALT FLD-CCNC: 26 U/L — SIGNIFICANT CHANGE UP (ref 10–45)
ANION GAP SERPL CALC-SCNC: 8 MMOL/L — SIGNIFICANT CHANGE UP (ref 5–17)
AST SERPL-CCNC: 20 U/L — SIGNIFICANT CHANGE UP (ref 10–40)
BILIRUB SERPL-MCNC: 0.4 MG/DL — SIGNIFICANT CHANGE UP (ref 0.2–1.2)
BUN SERPL-MCNC: 39 MG/DL — HIGH (ref 7–23)
CALCIUM SERPL-MCNC: 10 MG/DL — SIGNIFICANT CHANGE UP (ref 8.4–10.5)
CHLORIDE SERPL-SCNC: 101 MMOL/L — SIGNIFICANT CHANGE UP (ref 96–108)
CO2 SERPL-SCNC: 27 MMOL/L — SIGNIFICANT CHANGE UP (ref 22–31)
CREAT SERPL-MCNC: 1.86 MG/DL — HIGH (ref 0.5–1.3)
CULTURE RESULTS: SIGNIFICANT CHANGE UP
CULTURE RESULTS: SIGNIFICANT CHANGE UP
GLUCOSE SERPL-MCNC: 104 MG/DL — HIGH (ref 70–99)
HCT VFR BLD CALC: 25.7 % — LOW (ref 39–50)
HGB BLD-MCNC: 9.2 G/DL — LOW (ref 13–17)
MCHC RBC-ENTMCNC: 34.1 PG — HIGH (ref 27–34)
MCHC RBC-ENTMCNC: 35.8 GM/DL — SIGNIFICANT CHANGE UP (ref 32–36)
MCV RBC AUTO: 95.2 FL — SIGNIFICANT CHANGE UP (ref 80–100)
NRBC # BLD: 0 /100 WBCS — SIGNIFICANT CHANGE UP (ref 0–0)
PLATELET # BLD AUTO: 242 K/UL — SIGNIFICANT CHANGE UP (ref 150–400)
POTASSIUM SERPL-MCNC: 4.3 MMOL/L — SIGNIFICANT CHANGE UP (ref 3.5–5.3)
POTASSIUM SERPL-SCNC: 4.3 MMOL/L — SIGNIFICANT CHANGE UP (ref 3.5–5.3)
PROT SERPL-MCNC: 6.9 G/DL — SIGNIFICANT CHANGE UP (ref 6–8.3)
RBC # BLD: 2.7 M/UL — LOW (ref 4.2–5.8)
RBC # FLD: 15.3 % — HIGH (ref 10.3–14.5)
SODIUM SERPL-SCNC: 136 MMOL/L — SIGNIFICANT CHANGE UP (ref 135–145)
SPECIMEN SOURCE: SIGNIFICANT CHANGE UP
SPECIMEN SOURCE: SIGNIFICANT CHANGE UP
WBC # BLD: 6.41 K/UL — SIGNIFICANT CHANGE UP (ref 3.8–10.5)
WBC # FLD AUTO: 6.41 K/UL — SIGNIFICANT CHANGE UP (ref 3.8–10.5)

## 2020-09-14 PROCEDURE — 99233 SBSQ HOSP IP/OBS HIGH 50: CPT

## 2020-09-14 PROCEDURE — 99232 SBSQ HOSP IP/OBS MODERATE 35: CPT

## 2020-09-14 RX ADMIN — GABAPENTIN 300 MILLIGRAM(S): 400 CAPSULE ORAL at 13:04

## 2020-09-14 RX ADMIN — OXYCODONE HYDROCHLORIDE 5 MILLIGRAM(S): 5 TABLET ORAL at 08:20

## 2020-09-14 RX ADMIN — Medication 1 MILLIGRAM(S): at 11:36

## 2020-09-14 RX ADMIN — PANTOPRAZOLE SODIUM 40 MILLIGRAM(S): 20 TABLET, DELAYED RELEASE ORAL at 05:49

## 2020-09-14 RX ADMIN — TAMSULOSIN HYDROCHLORIDE 0.4 MILLIGRAM(S): 0.4 CAPSULE ORAL at 21:52

## 2020-09-14 RX ADMIN — Medication 650 MILLIGRAM(S): at 21:55

## 2020-09-14 RX ADMIN — GABAPENTIN 300 MILLIGRAM(S): 400 CAPSULE ORAL at 21:52

## 2020-09-14 RX ADMIN — GABAPENTIN 300 MILLIGRAM(S): 400 CAPSULE ORAL at 05:49

## 2020-09-14 RX ADMIN — Medication 12.5 MILLIGRAM(S): at 05:49

## 2020-09-14 RX ADMIN — Medication 100 MILLIGRAM(S): at 11:36

## 2020-09-14 RX ADMIN — Medication 4 MILLIGRAM(S): at 16:58

## 2020-09-14 RX ADMIN — ATORVASTATIN CALCIUM 40 MILLIGRAM(S): 80 TABLET, FILM COATED ORAL at 21:52

## 2020-09-14 RX ADMIN — Medication 81 MILLIGRAM(S): at 11:36

## 2020-09-14 RX ADMIN — OXYCODONE HYDROCHLORIDE 5 MILLIGRAM(S): 5 TABLET ORAL at 18:44

## 2020-09-14 RX ADMIN — Medication 4 MILLIGRAM(S): at 11:36

## 2020-09-14 RX ADMIN — Medication 650 MILLIGRAM(S): at 22:52

## 2020-09-14 RX ADMIN — Medication 4 MILLIGRAM(S): at 21:52

## 2020-09-14 RX ADMIN — OXYCODONE HYDROCHLORIDE 5 MILLIGRAM(S): 5 TABLET ORAL at 09:00

## 2020-09-14 RX ADMIN — Medication 12.5 MILLIGRAM(S): at 16:58

## 2020-09-14 RX ADMIN — Medication 325 MILLIGRAM(S): at 11:36

## 2020-09-14 RX ADMIN — Medication 4 MILLIGRAM(S): at 05:49

## 2020-09-14 RX ADMIN — OXYCODONE HYDROCHLORIDE 5 MILLIGRAM(S): 5 TABLET ORAL at 19:20

## 2020-09-14 RX ADMIN — RIVAROXABAN 15 MILLIGRAM(S): KIT at 16:59

## 2020-09-14 NOTE — PROGRESS NOTE ADULT - SUBJECTIVE AND OBJECTIVE BOX
CHIEF COMPLAINT: Improved pain, improved gait, improved endurance. Ambulating in hallway. Night uneventful.      HISTORY OF PRESENT ILLNESS  80 yo male PMHx of HTN, CAD s/p stents x 3 (2005), CKD, Afib on Xarelto, s/p Medtr[employer] PPM, myositis, presented to ED 8/30 with left weakness and neck pain. 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 Neurology and neurosurgery consulted. Per neuro evaluation, subcortical CVA suspected, ?right medullary/ pontine but this is uncertain/possibly due to small vessel disease versus cardioembolism related to atrial fibrillation. Unable to confirm with MRI- Pt with PPM that is not compatible with MRI. CTH repeated and stable. TTE EF 65%, no evidence of PFO. Pt with CT spine showing Multilevel cervical spondylosis with severe stenosis at C4-C5. Per Neurosurgery followup with Dr Douglas for aneurysm and C spine stenosis. No surgical intervention at this time. Evaluated by PMR and acute rehab recommended. (03 Sep 2020 15:20)      PAST MEDICAL & SURGICAL HISTORY:  Myositis  Hyperlipidemia  Hypertension  GI bleed  Diverticulitis  No significant past surgical history      VITALS  Vital Signs Last 24 Hrs  T(C): 36.7 (14 Sep 2020 08:28), Max: 36.8 (13 Sep 2020 20:05)  T(F): 98 (14 Sep 2020 08:28), Max: 98.2 (13 Sep 2020 20:05)  HR: 68 (14 Sep 2020 08:28) (68 - 82)  BP: 122/777 (14 Sep 2020 08:28) (122/777 - 142/75)  BP(mean): --  RR: 15 (14 Sep 2020 08:28) (14 - 15)  SpO2: 100% (14 Sep 2020 08:28) (99% - 100%)    PHYSICAL EXAM  Constitutional - In bed, limiting movement 2/2 neck pain  HEENT - EOMI  Neck - Supple, limited ROM 2/2 pain  Chest - CTA bilaterally  Cardiovascular - RR  Abdomen - BS+, Soft, NTND  Extremities - No C/C/E, No calf tenderness   Skin-no rash      Neurologic Exam - Awake, Alert, without new deficits.     Balance - impaired     Psychiatric - Mood stable, Affect WNL       FUNCTIONAL PROGRESS  Gait - 75ft RW min/CG  ADLs - CG A  Transfers - min A/CG  Functional transfer - min A/CG      RECENT LABS                        9.2    6.41  )-----------( 242      ( 14 Sep 2020 05:15 )             25.7     09-14    136  |  101  |  39<H>  ----------------------------<  104<H>  4.3   |  27  |  1.86<H>    Ca    10.0      14 Sep 2020 05:15    TPro  6.9  /  Alb  3.0<L>  /  TBili  0.4  /  DBili  x   /  AST  20  /  ALT  26  /  AlkPhos  56  09-14      LIVER FUNCTIONS - ( 14 Sep 2020 05:15 )  Alb: 3.0 g/dL / Pro: 6.9 g/dL / ALK PHOS: 56 U/L / ALT: 26 U/L / AST: 20 U/L / GGT: x             Direct LDL: 85 mg/dL (08-31-20 @ 06:52)                RADIOLOGY/OTHER RESULTS      EXAM:  CT ABDOMEN AND PELVIS      PROCEDURE DATE:  09/11/2020        INTERPRETATION:  CLINICAL INFORMATION: Fever. Anemia and thrombocytopenia. Undergoing rehabilitation for acute CVA.    COMPARISON: None.    PROCEDURE:  CT of the Abdomen and Pelvis was performed without intravenous contrast.  Intravenous contrast: None.  Oral contrast: None.  Sagittal and coronal reformats were performed.    FINDINGS:    LOWER CHEST: Cardiac pacemaker lead.    The evaluation of the solid organs recommend limited without intravenous contrast.    LIVER: Cysts  Small indeterminate hypodense lesion centrally at the dome of the liver. Lateral segment left hepatic lobe.  BILE DUCTS: Normal caliber.  GALLBLADDER: Not visualized, correlate with surgical history.  SPLEEN: Within normal limits.  PANCREAS: Within normal limits.  ADRENALS: Within normal limits.  KIDNEYS/URETERS: No hydronephrosis or obstructing ureteral calculus noted.    BLADDER: Moderately distended.  Small diverticulum at the right dome.  Thin calcifications along the right posterior wall.  REPRODUCTIVE ORGANS: Enlarged prostate.    BOWEL: Distended fluid-filled stomach.  Moderate fecal retention throughout the colon, without obstruction.  Colonic diverticulosis, without CT evidence of diverticulitis.   Appendix is mildly prominent, measuring between 6 and 7 mm, extending into the right lower pelvis.  No periappendiceal inflammatory change.  PERITONEUM: No ascites.  No localized intra-abdominal fluid collection or pneumoperitoneum.    VESSELS: Atherosclerotic calcification of the abdominal aorta with mild infrarenal ectasia, measuring up to 2.7 cm.  RETROPERITONEUM/LYMPH NODES: No enlarged retroperitoneal lymphadenopathy.  ABDOMINAL WALL: Small amount of fluid scrotal sac.    BONES:Degenerative changes of the spine.    IMPRESSION:    Fluid distended stomach, correlate clinically for gastritis or peptic ulcer disease.    Moderate fecal retention without bowel obstruction.    Other findings as discussed.                SEBASTIEN REDMOND M.D., ATTENDING RADIOLOGIST  This document has been electronically signed. Sep 11 2020  4:58PM              CURRENT MEDICATIONS  MEDICATIONS  (STANDING):  allopurinol 100 milliGRAM(s) Oral daily  aspirin enteric coated 81 milliGRAM(s) Oral daily  atorvastatin 40 milliGRAM(s) Oral at bedtime  bisacodyl Suppository 10 milliGRAM(s) Rectal daily  ferrous    sulfate 325 milliGRAM(s) Oral daily  folic acid 1 milliGRAM(s) Oral daily  gabapentin 300 milliGRAM(s) Oral three times a day  influenza   Vaccine 0.5 milliLiter(s) IntraMuscular once  methylPREDNISolone   Oral   metoprolol tartrate 12.5 milliGRAM(s) Oral two times a day  pantoprazole    Tablet 40 milliGRAM(s) Oral before breakfast  polyethylene glycol 3350 17 Gram(s) Oral two times a day  rivaroxaban 15 milliGRAM(s) Oral with dinner  senna 2 Tablet(s) Oral at bedtime  tamsulosin 0.4 milliGRAM(s) Oral at bedtime  thiamine 100 milliGRAM(s) Oral daily    MEDICATIONS  (PRN):  acetaminophen   Tablet .. 650 milliGRAM(s) Oral every 6 hours PRN Temp greater or equal to 38C (100.4F), Mild Pain (1 - 3)  guaiFENesin   Syrup  (Sugar-Free) 100 milliGRAM(s) Oral every 6 hours PRN Cough  oxyCODONE    IR 5 milliGRAM(s) Oral every 8 hours PRN Severe Pain (7 - 10)      ASSESSMENT & PLAN    GI/Bowel Management - Miralax, Senna, Prunes, Dulcolax as per request. GI clearance to continue AC obtained.   Management - Toilet Q2,  eval for frequency completed-Flomax, PVRs 140cc-240cc.   Skin - Turn Q2  Pain - Tylenol PRN, Oxycodone with bowel regimen, Neurontin TID at 300cc. Ortho eval completed. On medrol with pain much improved-tolerating therapy.  DVT PPX - Xarelto continues  Diet - reg    Continue comprehensive acute rehab program consisting of 3hrs/day of OT/PT and SLP.

## 2020-09-14 NOTE — PROGRESS NOTE ADULT - SUBJECTIVE AND OBJECTIVE BOX
HPI:  80 yo male PMHx of HTN, CAD s/p stents x 3 (2005), CKD, Afib on Xarelto, s/p [employer] PPM, myositis, presented to ED 8/30 with left weakness and neck pain. 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 Neurology and neurosurgery consulted. Per neuro evaluation, subcortical CVA suspected, ?right medullary/ pontine but this is uncertain/possibly due to small vessel disease versus cardioembolism related to atrial fibrillation. Unable to confirm with MRI- Pt with PPM that is not compatible with MRI. CTH repeated and stable. TTE EF 65%, no evidence of PFO. Pt with CT spine showing Multilevel cervical spondylosis with severe stenosis at C4-C5. Per Neurosurgery followup with Dr Douglas for aneurysm and C spine stenosis. No surgical intervention at this time. Evaluated by PMR and acute rehab recommended. (03 Sep 2020 15:20)      Subjective    Neck pain is better but low back pain going to leg not better.             PAST MEDICAL & SURGICAL HISTORY:  Myositis    Hyperlipidemia    Hypertension    GI bleed    Diverticulitis    No significant past surgical history        MedsMEDICATIONS  (STANDING):  allopurinol 100 milliGRAM(s) Oral daily  aspirin enteric coated 81 milliGRAM(s) Oral daily  atorvastatin 40 milliGRAM(s) Oral at bedtime  bisacodyl Suppository 10 milliGRAM(s) Rectal daily  ferrous    sulfate 325 milliGRAM(s) Oral daily  folic acid 1 milliGRAM(s) Oral daily  gabapentin 300 milliGRAM(s) Oral three times a day  influenza   Vaccine 0.5 milliLiter(s) IntraMuscular once  methylPREDNISolone   Oral   metoprolol tartrate 12.5 milliGRAM(s) Oral two times a day  pantoprazole    Tablet 40 milliGRAM(s) Oral before breakfast  polyethylene glycol 3350 17 Gram(s) Oral two times a day  rivaroxaban 15 milliGRAM(s) Oral with dinner  senna 2 Tablet(s) Oral at bedtime  tamsulosin 0.4 milliGRAM(s) Oral at bedtime  thiamine 100 milliGRAM(s) Oral daily    MEDICATIONS  (PRN):  acetaminophen   Tablet .. 650 milliGRAM(s) Oral every 6 hours PRN Temp greater or equal to 38C (100.4F), Mild Pain (1 - 3)  guaiFENesin   Syrup  (Sugar-Free) 100 milliGRAM(s) Oral every 6 hours PRN Cough  oxyCODONE    IR 5 milliGRAM(s) Oral every 8 hours PRN Severe Pain (7 - 10)      Vital Signs Last 24 Hrs  T(C): 36.7 (14 Sep 2020 08:28), Max: 36.8 (13 Sep 2020 20:05)  T(F): 98 (14 Sep 2020 08:28), Max: 98.2 (13 Sep 2020 20:05)  HR: 68 (14 Sep 2020 08:28) (68 - 82)  BP: 122/777 (14 Sep 2020 08:28) (122/777 - 142/75)  BP(mean): --  RR: 15 (14 Sep 2020 08:28) (14 - 15)  SpO2: 100% (14 Sep 2020 08:28) (99% - 100%)  I&O's Summary      PHYSICAL EXAM:  GENERAL: NAD  NECK: Supple  NERVOUS SYSTEM:  awake and alert  HEART: S1s2 NL , RRR  CHEST/LUNG: Clear to percussion bilaterally  ABDOMEN: Soft, Nontender, Nondistended; Bowel sounds present  EXTREMITIES:  No edema      LABS:(09-14 @ 05:15)                      9.2  6.41 )-----------( 242                 25.7    Neutrophils = -- (--%)  Lymphocytes = -- (--%)  Eosinophils = -- (--%)  Basophils = -- (--%)  Monocytes = -- (--%)  Bands = --%    09-14    136  |  101  |  39<H>  ----------------------------<  104<H>  4.3   |  27  |  1.86<H>    Ca    10.0      14 Sep 2020 05:15    TPro  6.9  /  Alb  3.0<L>  /  TBili  0.4  /  DBili  x   /  AST  20  /  ALT  26  /  AlkPhos  56  09-14        Imaging Personally Reviewed:  [ ] YES  [ ] NO        Care Discussed with Consultants/Other Providers [ x] YES  [ ] NO      PT/OT per rehab  ASA/Statin/Xarelto    presyncope  Resolved    Anemia  Monitor h/h  Iron  Guaiac pos-GI seen pt    Thrombocytopenia  Resolved    PAF  Xarelto/BB    Neck pain with cervical stenosis not new  Pt declined lidoderm  APAP/gabapentin  Medrol dose pack  LBP-?image of lumbar spine as not better with above treatment    CKD3  Baseline Cr unknown  Ct stable  Monitor for now    fever 9/8 night  UA neg for UTI(no pyuria). urine c/s staph likely contaminant  CXR neg   Lactic acidosis resolved with IVF  Pt had neg fever w/u on 9/4 also  Elev PCT. D-dimer neg  ECHO and CT abd neg for infection  Completed 5 day course of Abx on 9/13

## 2020-09-14 NOTE — PROGRESS NOTE ADULT - ATTENDING COMMENTS
Neck pain and LE buckling/ myelopathic symptoms are improving with oral steroids, observed in therapy.  CVA ppx - Xarelto/ statin   input appreciated, Flomax added- BPH/ neurogenic bladder  Remains afebrile off ABX, ID input appreciated    Multidisciplinary team meeting today:  patient's functional goals and needs, functional and clinical  progress were discussed, barriers to discharge were identified. Anticipate discharge home with home care, 24/7 supervision for safety after family training     EDOD 9/24/20 Neck pain and LE buckling/ myelopathic symptoms are improving with oral steroids, observed in therapy.  CVA ppx - Xarelto/ statin   input appreciated, Flomax added- BPH/ neurogenic bladder  Remains afebrile off ABX, ID input appreciated  GI to comment on CT abdomen results     Multidisciplinary team meeting today:  patient's functional goals and needs, functional and clinical  progress were discussed, barriers to discharge were identified. Anticipate discharge home with home care, 24/7 supervision for safety after family training     EDOD 9/24/20

## 2020-09-14 NOTE — PROGRESS NOTE ADULT - SUBJECTIVE AND OBJECTIVE BOX
CC: f/u for  fever  Patient reports  his neck and left hip hurt , going on for several weeks.   REVIEW OF SYSTEMS:  All other review of systems negative (Comprehensive ROS)    Antimicrobials Day #  :    Other Medications Reviewed    T(F): 98 (09-14-20 @ 08:28), Max: 98.2 (09-13-20 @ 20:05)  HR: 68 (09-14-20 @ 08:28)  BP: 122/777 (09-14-20 @ 08:28)  RR: 15 (09-14-20 @ 08:28)  SpO2: 100% (09-14-20 @ 08:28)  Wt(kg): --    PHYSICAL EXAM:  General: alert, no acute distress  Eyes:  anicteric, no conjunctival injection, no discharge  Oropharynx: no lesions or injection 	  Neck: supple, without adenopathy  Lungs: clear to auscultation  Heart: regular rate and rhythm; no murmur, rubs or gallops  Abdomen: soft, nondistended, nontender, without mass or organomegaly  Skin: no lesions  Extremities: no clubbing, cyanosis, or edema. no pain with rom left hip, no swelling  Neurologic: alert, oriented, moves all extremities    LAB RESULTS:                        9.2    6.41  )-----------( 242      ( 14 Sep 2020 05:15 )             25.7     09-14    136  |  101  |  39<H>  ----------------------------<  104<H>  4.3   |  27  |  1.86<H>    Ca    10.0      14 Sep 2020 05:15    TPro  6.9  /  Alb  3.0<L>  /  TBili  0.4  /  DBili  x   /  AST  20  /  ALT  26  /  AlkPhos  56  09-14    LIVER FUNCTIONS - ( 14 Sep 2020 05:15 )  Alb: 3.0 g/dL / Pro: 6.9 g/dL / ALK PHOS: 56 U/L / ALT: 26 U/L / AST: 20 U/L / GGT: x             MICROBIOLOGY:  RECENT CULTURES:      RADIOLOGY REVIEWED:    < from: CT Cervical Spine No Cont (09.08.20 @ 15:46) >    EXAM:  CT CERVICAL SPINE      PROCEDURE DATE:  09/08/2020        INTERPRETATION:  CT cervical spine without contrast    Comparison 7 days ago    History neck pain and weakness    There is reversal of cervical lordosis. There is markedly severe multilevel degenerative disc and endplate change with severe facet arthropathy fairly isolated to C2-3 on the right. There is mild degenerative anterolisthesis at C2-3. Dorsal osteophyte contributes to moderate to severe spinal stenosis at C4-5. Residual AP canal dimension is roughly 3 mm. There is mild stenosis at C3-4 and C5-C6 and C6-7. There is severe bilateral foraminal stenosis at C4-5 with less pronounced change elsewhere. There is no osseous destruction or demineralization. A pacemaker is noted.    IMPRESSION:  Severe spondylosis with spinal stenosis, not significantly changed over one week      < end of copied text >    rd< from: CT Abdomen and Pelvis No Cont (09.11.20 @ 16:34) >  EXAM:  CT ABDOMEN AND PELVIS      PROCEDURE DATE:  09/11/2020        INTERPRETATION:  CLINICAL INFORMATION: Fever. Anemia and thrombocytopenia. Undergoing rehabilitation for acute CVA.    COMPARISON: None.    PROCEDURE:  CT of the Abdomen and Pelvis was performed without intravenous contrast.  Intravenous contrast: None.  Oral contrast: None.  Sagittal and coronal reformats were performed.    FINDINGS:    LOWER CHEST: Cardiac pacemaker lead.    The evaluation of the solid organs recommend limited without intravenous contrast.    LIVER: Cysts  Small indeterminate hypodense lesion centrally at the dome of the liver. Lateral segment left hepatic lobe.  BILE DUCTS: Normal caliber.  GALLBLADDER: Not visualized, correlate with surgical history.  SPLEEN: Within normal limits.  PANCREAS: Within normal limits.  ADRENALS: Within normal limits.  KIDNEYS/URETERS: No hydronephrosis or obstructing ureteral calculus noted.    BLADDER: Moderately distended.  Small diverticulum at the right dome.  Thin calcifications along the right posterior wall.  REPRODUCTIVE ORGANS: Enlarged prostate.    BOWEL: Distended fluid-filled stomach.  Moderate fecal retention throughout the colon, without obstruction.  Colonic diverticulosis, without CT evidence of diverticulitis.   Appendix is mildly prominent, measuring between 6 and 7 mm, extending into the right lower pelvis.  No periappendiceal inflammatory change.  PERITONEUM: No ascites.  No localized intra-abdominal fluid collection or pneumoperitoneum.    VESSELS: Atherosclerotic calcification of the abdominal aorta with mild infrarenal ectasia, measuring up to 2.7 cm.  RETROPERITONEUM/LYMPH NODES: No enlarged retroperitoneal lymphadenopathy.  ABDOMINAL WALL: Small amount of fluid scrotal sac.    BONES:Degenerative changes of the spine.    IMPRESSION:    Fluid distended stomach, correlate clinically for gastritis or peptic ulcer disease.    Moderate fecal retention without bowel obstruction.    Other findings as discussed.    < end of copied text >          Assessment:  Patient in rehab after stay at [facility] for cva, had fever now resolved s/p 5 days empiric antibiotics. Only culture that grew is the urine with staph epi but no pyuria. Exam and imaging not with obvious infection. He is now afebrile. He has neck and hip pain but exam looks ok     Plan:  monitor off antibiotics  xray left hip

## 2020-09-14 NOTE — PROGRESS NOTE ADULT - ASSESSMENT
EVERARDO FIGUEROA is a 78yo Male with possible right sided subcortical CVA with left sided weakness and with decreased functional mobility, gait instability and ADL impairments.    - COMORBIDITES/ACTIVE MEDICAL ISSUES     Gait Instability, ADL impairments and Functional impairments:  Comprehensive Rehab Program of PT/OT/SLP-tolerating therapy today.     #CVA  - PT/OT/SLP continues. Activities limited by neck pain. Neurontin at 300mg TID. Pain and mobility improved.  -Xarelto for Hx Afib, Guaiac+ on 9/8, no overt bleeding noted otherwise. Cleared by GI for Xarelto to continue. Outpt follow up with Dr Olivo.  -ASA for CAD-no chest pain reported. PPM interrogated.   - Lipitor for goal LDL < 70  - GI ppx with Protonix       #Hx Unruptured aneurysm  -outpt follow up Neurosurgery  -no headache, awake and alert    #HTN  -Lopressor BID to 12.5mg as per hospitalist plan. Cardiology evaluation for hx recurrent pre-syncope. PPM interrogated.    #Anemia/thrombocytopenia  -s/p PRBC x 1 unit, on Xarelto, no overt bleeding reported. Guaiac+ noted.   -labs following  -hematology following    #Neck pain/ Cervical spine stenosis   - Neurontin at 300mg TID.   - Tylenol PRN, oxycodone prn with bowel regimen. Warm compress to neck ok. Soft collar on/off prn for comfort.  - follow up CT Cspine-completed.   - Neurosurgical follow as outpatient   -Orthopedic Surgery eval completed-medrol pack and modalities. Currently pain and knee weakness improved. Participates in therapy.    #Fever  -Low grade temps with elevated Lactate on 9/9, resolved. Off abx as per ID.    -ID following.      #Urinary frequency  -improved   -Flomax added by  for BPH. PVRs 140-240cc.    #gastritis  -?gastritis on CT AP-will request medicine to comment    GI/Bowel Mgmt - Senna,  Miralax, Dulcolax  /Bladder Mgmt - Voiding independently, PVRx1 and low      Precautions / PROPHYLAXIS:   - Falls, Cardiac, Seizure   - Ortho: Weight bearing status: WBAT   - Lungs: Aspiration, Incentive Spirometer   - Pressure injury/Skin: Turn Q2hrs while in bed, OOB to Chair, PT/OT    - DVT: Xarelto   EVERARDO FIGUEROA is a 78yo Male with possible right sided subcortical CVA with left sided weakness and with decreased functional mobility, gait instability and ADL impairments.    - COMORBIDITES/ACTIVE MEDICAL ISSUES     Gait Instability, ADL impairments and Functional impairments:  Comprehensive Rehab Program of PT/OT/SLP-tolerating therapy today.     #CVA  - PT/OT/SLP continues. Activities limited by neck pain. Neurontin at 300mg TID. Pain and mobility improved.  -Xarelto for Hx Afib, Guaiac+ on 9/8, no overt bleeding noted otherwise. Cleared by GI for Xarelto to continue. Outpt follow up with Dr Olivo.  -ASA for CAD-no chest pain reported. PPM interrogated.   - Lipitor for goal LDL < 70  - GI ppx with Protonix       #Hx Unruptured aneurysm  -outpt follow up Neurosurgery  -no headache, awake and alert    #HTN  -Lopressor BID to 12.5mg as per hospitalist plan. Cardiology evaluation for hx recurrent pre-syncope. PPM interrogated.    #Anemia/thrombocytopenia  -s/p PRBC x 1 unit, on Xarelto, no overt bleeding reported. Guaiac+ noted.   -labs following  -hematology following    #Neck pain/ Cervical spine stenosis   - Neurontin at 300mg TID.   - Tylenol PRN, oxycodone prn with bowel regimen. Warm compress to neck ok. Soft collar on/off prn for comfort.  - follow up CT Cspine-completed.   - Neurosurgical follow as outpatient   -Orthopedic Surgery eval completed-medrol pack and modalities. Currently pain and knee weakness improved. Participates in therapy.    #Fever  -Low grade temps with elevated Lactate on 9/9, resolved.  observe Off abx as per ID.    -ID is following.      #Urinary frequency  -improved   -Flomax added by  for BPH. PVRs 140-240cc.    #gastritis  -?gastritis on CT AP-will request medicine to comment    GI/Bowel Mgmt - Senna,  Miralax, Dulcolax  /Bladder Mgmt - Voiding independently, PVRx1 and low      Precautions / PROPHYLAXIS:   - Falls, Cardiac, Seizure   - Ortho: Weight bearing status: WBAT   - Lungs: Aspiration, Incentive Spirometer   - Pressure injury/Skin: Turn Q2hrs while in bed, OOB to Chair, PT/OT    - DVT: Xarelto

## 2020-09-15 PROCEDURE — 99232 SBSQ HOSP IP/OBS MODERATE 35: CPT

## 2020-09-15 PROCEDURE — 73502 X-RAY EXAM HIP UNI 2-3 VIEWS: CPT | Mod: 26,LT

## 2020-09-15 RX ADMIN — Medication 100 MILLIGRAM(S): at 12:22

## 2020-09-15 RX ADMIN — OXYCODONE HYDROCHLORIDE 5 MILLIGRAM(S): 5 TABLET ORAL at 10:00

## 2020-09-15 RX ADMIN — OXYCODONE HYDROCHLORIDE 5 MILLIGRAM(S): 5 TABLET ORAL at 09:16

## 2020-09-15 RX ADMIN — Medication 12.5 MILLIGRAM(S): at 18:08

## 2020-09-15 RX ADMIN — Medication 1 MILLIGRAM(S): at 12:21

## 2020-09-15 RX ADMIN — GABAPENTIN 300 MILLIGRAM(S): 400 CAPSULE ORAL at 13:38

## 2020-09-15 RX ADMIN — Medication 4 MILLIGRAM(S): at 12:45

## 2020-09-15 RX ADMIN — Medication 12.5 MILLIGRAM(S): at 06:13

## 2020-09-15 RX ADMIN — PANTOPRAZOLE SODIUM 40 MILLIGRAM(S): 20 TABLET, DELAYED RELEASE ORAL at 06:13

## 2020-09-15 RX ADMIN — Medication 81 MILLIGRAM(S): at 12:21

## 2020-09-15 RX ADMIN — Medication 4 MILLIGRAM(S): at 06:12

## 2020-09-15 RX ADMIN — ATORVASTATIN CALCIUM 40 MILLIGRAM(S): 80 TABLET, FILM COATED ORAL at 21:36

## 2020-09-15 RX ADMIN — Medication 4 MILLIGRAM(S): at 21:36

## 2020-09-15 RX ADMIN — GABAPENTIN 300 MILLIGRAM(S): 400 CAPSULE ORAL at 21:36

## 2020-09-15 RX ADMIN — Medication 100 MILLIGRAM(S): at 12:21

## 2020-09-15 RX ADMIN — GABAPENTIN 300 MILLIGRAM(S): 400 CAPSULE ORAL at 06:13

## 2020-09-15 RX ADMIN — RIVAROXABAN 15 MILLIGRAM(S): KIT at 16:45

## 2020-09-15 RX ADMIN — Medication 325 MILLIGRAM(S): at 12:21

## 2020-09-15 RX ADMIN — OXYCODONE HYDROCHLORIDE 5 MILLIGRAM(S): 5 TABLET ORAL at 21:35

## 2020-09-15 RX ADMIN — TAMSULOSIN HYDROCHLORIDE 0.4 MILLIGRAM(S): 0.4 CAPSULE ORAL at 21:36

## 2020-09-15 RX ADMIN — OXYCODONE HYDROCHLORIDE 5 MILLIGRAM(S): 5 TABLET ORAL at 22:29

## 2020-09-15 NOTE — PROGRESS NOTE ADULT - SUBJECTIVE AND OBJECTIVE BOX
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 Neurology and neurosurgery consulted. Per neuro evaluation, subcortical CVA suspected, ?right medullary/ pontine but this is uncertain/possibly due to small vessel disease versus cardioembolism related to atrial fibrillation. Unable to confirm with MRI- Pt with PPM that is not compatible with MRI. CTH repeated and stable. TTE EF 65%, no evidence of PFO. Pt with CT spine showing Multilevel cervical spondylosis with severe stenosis at C4-C5. Per Neurosurgery followup with Dr Douglas for aneurysm and C spine stenosis. No surgical intervention at this time. Evaluated by PMR and acute rehab recommended. (03 Sep 2020 15:20)    voiding better with flomax       Interval Events:  Patient seen and examined at bedside.    MEDICATIONS:  MEDICATIONS  (STANDING):  allopurinol 100 milliGRAM(s) Oral daily  aspirin enteric coated 81 milliGRAM(s) Oral daily  atorvastatin 40 milliGRAM(s) Oral at bedtime  bisacodyl Suppository 10 milliGRAM(s) Rectal daily  ferrous    sulfate 325 milliGRAM(s) Oral daily  folic acid 1 milliGRAM(s) Oral daily  gabapentin 300 milliGRAM(s) Oral three times a day  influenza   Vaccine 0.5 milliLiter(s) IntraMuscular once  methylPREDNISolone   Oral   methylPREDNISolone 4 milliGRAM(s) Oral before breakfast  methylPREDNISolone 4 milliGRAM(s) Oral after lunch  methylPREDNISolone 4 milliGRAM(s) Oral at bedtime  metoprolol tartrate 12.5 milliGRAM(s) Oral two times a day  pantoprazole    Tablet 40 milliGRAM(s) Oral before breakfast  polyethylene glycol 3350 17 Gram(s) Oral two times a day  rivaroxaban 15 milliGRAM(s) Oral with dinner  senna 2 Tablet(s) Oral at bedtime  tamsulosin 0.4 milliGRAM(s) Oral at bedtime  thiamine 100 milliGRAM(s) Oral daily    MEDICATIONS  (PRN):  acetaminophen   Tablet .. 650 milliGRAM(s) Oral every 6 hours PRN Temp greater or equal to 38C (100.4F), Mild Pain (1 - 3)  guaiFENesin   Syrup  (Sugar-Free) 100 milliGRAM(s) Oral every 6 hours PRN Cough  oxyCODONE    IR 5 milliGRAM(s) Oral every 8 hours PRN Severe Pain (7 - 10)      Allergies    No Known Allergies    Intolerances        T(C): 36.8 (09-14-20 @ 20:18), Max: 36.8 (09-13-20 @ 20:05)  T(F): 98.2 (09-14-20 @ 20:18), Max: 98.2 (09-13-20 @ 20:05)  HR: 82 (09-15-20 @ 06:10) (68 - 82)  BP: 120/74 (09-15-20 @ 06:10) (120/74 - 149/76)  RR: 16 (09-14-20 @ 20:18) (14 - 16)  SpO2: 98% (09-14-20 @ 20:18) (98% - 100%)              LABS:      CBC Full  -  ( 14 Sep 2020 05:15 )  WBC Count : 6.41 K/uL  RBC Count : 2.70 M/uL  Hemoglobin : 9.2 g/dL  Hematocrit : 25.7 %  Platelet Count - Automated : 242 K/uL  Mean Cell Volume : 95.2 fl  Mean Cell Hemoglobin : 34.1 pg  Mean Cell Hemoglobin Concentration : 35.8 gm/dL  Auto Neutrophil # : x  Auto Lymphocyte # : x  Auto Monocyte # : x  Auto Eosinophil # : x  Auto Basophil # : x  Auto Neutrophil % : x  Auto Lymphocyte % : x  Auto Monocyte % : x  Auto Eosinophil % : x  Auto Basophil % : x    09-14    136  |  101  |  39<H>  ----------------------------<  104<H>  4.3   |  27  |  1.86<H>    Ca    10.0      14 Sep 2020 05:15    TPro  6.9  /  Alb  3.0<L>  /  TBili  0.4  /  DBili  x   /  AST  20  /  ALT  26  /  AlkPhos  56  09-14                  Physical Exam    Constitutional: alert, no acute distress    Abdomen: soft, nontender, nondistended, no HSM    Genitourinary: no bladder distention

## 2020-09-15 NOTE — PROGRESS NOTE ADULT - SUBJECTIVE AND OBJECTIVE BOX
CHIEF COMPLAINT: Improved pain and mobility.      HISTORY OF PRESENT ILLNESS  78 yo male PMHx of HTN, CAD s/p stents x 3 (2005), CKD, Afib on Xarelto, s/p Medtronic PPM, myositis, presented to ED 8/30 with left weakness and neck pain. 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 Neurology and neurosurgery consulted. Per neuro evaluation, subcortical CVA suspected, ?right medullary/ pontine but this is uncertain/possibly due to small vessel disease versus cardioembolism related to atrial fibrillation. Unable to confirm with MRI- Pt with PPM that is not compatible with MRI. CTH repeated and stable. TTE EF 65%, no evidence of PFO. Pt with CT spine showing Multilevel cervical spondylosis with severe stenosis at C4-C5. Per Neurosurgery followup with Dr Douglas for aneurysm and C spine stenosis. No surgical intervention at this time. Evaluated by PMR and acute rehab recommended. (03 Sep 2020 15:20)      PAST MEDICAL & SURGICAL HISTORY:  Myositis    Hyperlipidemia    Hypertension    GI bleed    Diverticulitis    No significant past surgical history    VITALS  Vital Signs Last 24 Hrs  T(C): 36.5 (15 Sep 2020 08:13), Max: 36.8 (14 Sep 2020 20:18)  T(F): 97.7 (15 Sep 2020 08:13), Max: 98.2 (14 Sep 2020 20:18)  HR: 75 (15 Sep 2020 08:13) (74 - 82)  BP: 131/76 (15 Sep 2020 08:13) (120/74 - 149/76)  BP(mean): --  RR: 15 (15 Sep 2020 08:13) (15 - 16)  SpO2: 100% (15 Sep 2020 08:13) (98% - 100%)    PHYSICAL EXAM  Constitutional - In bed, limiting movement 2/2 neck pain  HEENT - EOMI  Neck - Supple, limited ROM 2/2 pain  Chest - CTA bilaterally  Cardiovascular - RR  Abdomen - BS+, Soft, NTND  Extremities - No C/C/E, No calf tenderness   Skin-no rash      Neurologic Exam - Awake, Alert, without new deficits.     Balance - impaired     Psychiatric - Mood stable, Affect WNL       FUNCTIONAL PROGRESS  Gait - 75ft RW min/CG  ADLs - CG A  Transfers - min A/CG  Functional transfer - min A/CG    RECENT LABS                        9.2    6.41  )-----------( 242      ( 14 Sep 2020 05:15 )             25.7     09-14    136  |  101  |  39<H>  ----------------------------<  104<H>  4.3   |  27  |  1.86<H>    Ca    10.0      14 Sep 2020 05:15    TPro  6.9  /  Alb  3.0<L>  /  TBili  0.4  /  DBili  x   /  AST  20  /  ALT  26  /  AlkPhos  56  09-14      LIVER FUNCTIONS - ( 14 Sep 2020 05:15 )  Alb: 3.0 g/dL / Pro: 6.9 g/dL / ALK PHOS: 56 U/L / ALT: 26 U/L / AST: 20 U/L / GGT: x             Direct LDL: 85 mg/dL (08-31-20 @ 06:52)                RADIOLOGY/OTHER RESULTS      CURRENT MEDICATIONS  MEDICATIONS  (STANDING):  allopurinol 100 milliGRAM(s) Oral daily  aspirin enteric coated 81 milliGRAM(s) Oral daily  atorvastatin 40 milliGRAM(s) Oral at bedtime  bisacodyl Suppository 10 milliGRAM(s) Rectal daily  ferrous    sulfate 325 milliGRAM(s) Oral daily  folic acid 1 milliGRAM(s) Oral daily  gabapentin 300 milliGRAM(s) Oral three times a day  influenza   Vaccine 0.5 milliLiter(s) IntraMuscular once  methylPREDNISolone   Oral   methylPREDNISolone 4 milliGRAM(s) Oral before breakfast  methylPREDNISolone 4 milliGRAM(s) Oral at bedtime  metoprolol tartrate 12.5 milliGRAM(s) Oral two times a day  pantoprazole    Tablet 40 milliGRAM(s) Oral before breakfast  polyethylene glycol 3350 17 Gram(s) Oral two times a day  rivaroxaban 15 milliGRAM(s) Oral with dinner  senna 2 Tablet(s) Oral at bedtime  tamsulosin 0.4 milliGRAM(s) Oral at bedtime  thiamine 100 milliGRAM(s) Oral daily    MEDICATIONS  (PRN):  acetaminophen   Tablet .. 650 milliGRAM(s) Oral every 6 hours PRN Temp greater or equal to 38C (100.4F), Mild Pain (1 - 3)  guaiFENesin   Syrup  (Sugar-Free) 100 milliGRAM(s) Oral every 6 hours PRN Cough  oxyCODONE    IR 5 milliGRAM(s) Oral every 8 hours PRN Severe Pain (7 - 10)      ASSESSMENT & PLAN    GI/Bowel Management - Miralax, Senna, Prunes, Dulcolax as per request. GI clearance to continue AC obtained.   Management - Toilet Q2,  eval for frequency completed-Flomax, PVRs 140cc-240cc.   Skin - Turn Q2  Pain - Tylenol PRN, Oxycodone with bowel regimen, Neurontin TID at 300cc. Ortho eval completed. On medrol with pain much improved-tolerating therapy.  DVT PPX - Xarelto continues  Diet - reg    Continue comprehensive acute rehab program consisting of 3hrs/day of OT/PT and SLP.

## 2020-09-15 NOTE — PROGRESS NOTE ADULT - ATTENDING COMMENTS
Neurologically and functionally improving, better mood and energy  Full program  Discharge plan is in progress

## 2020-09-15 NOTE — PROGRESS NOTE ADULT - SUBJECTIVE AND OBJECTIVE BOX
CC: f/u for fever, fever has resolved     Patient is sitting up, he has is afebrile     REVIEW OF SYSTEMS:  All other review of systems negative (Comprehensive ROS)      Vital Signs Last 24 Hrs  T(C): 36.5 (15 Sep 2020 08:13), Max: 36.8 (14 Sep 2020 20:18)  T(F): 97.7 (15 Sep 2020 08:13), Max: 98.2 (14 Sep 2020 20:18)  HR: 75 (15 Sep 2020 08:13) (74 - 82)  BP: 131/76 (15 Sep 2020 08:13) (120/74 - 149/76)  BP(mean): --  RR: 15 (15 Sep 2020 08:13) (15 - 16)  SpO2: 100% (15 Sep 2020 08:13) (98% - 100%)    PHYSICAL EXAM:  General: alert, no acute distress  Eyes:  anicteric, no conjunctival injection, no discharge  Lungs: clear to auscultation  Heart: regular rate and rhythm; no murmur, rubs or gallops  Abdomen: soft, nondistended, nontender  Skin: no lesions  Extremities: no clubbing, cyanosis, or edema  Neurologic: alert, oriented     LAB RESULTS:                        9.2    6.41  )-----------( 242      ( 14 Sep 2020 05:15 )             25.7     09-14    136  |  101  |  39<H>  ----------------------------<  104<H>  4.3   |  27  |  1.86<H>    Ca    10.0      14 Sep 2020 05:15    TPro  6.9  /  Alb  3.0<L>  /  TBili  0.4  /  DBili  x   /  AST  20  /  ALT  26  /  AlkPhos  56  09-14        MICROBIOLOGY:  RECENT CULTURES:  09-09 @ 12:00 .Blood Blood-Peripheral     No growth to date.      09-09 @ 02:32 .Urine Clean Catch (Midstream)     >100,000 CFU/ml Staphylococcus epidermidis  "Susceptibilities not performed"          RADIOLOGY REVIEWED:        Antimicrobials Day #    piperacillin/tazobactam IVPB.. 3.375 Gram(s) IV Intermittent every 8 hours  vancomycin  IVPB 750 milliGRAM(s) IV Intermittent every 24 hours    Other Medications Reviewed

## 2020-09-15 NOTE — PROGRESS NOTE ADULT - ASSESSMENT
EVERARDO FIGUEROA is a 78yo Male with possible right sided subcortical CVA with left sided weakness and with decreased functional mobility, gait instability and ADL impairments.    - COMORBIDITES/ACTIVE MEDICAL ISSUES     Gait Instability, ADL impairments and Functional impairments:  Comprehensive Rehab Program of PT/OT/SLP-tolerating therapy. family training planned. Progress and d/c plan discussed with wife on 9/14-questions answered, grateful for care.    #CVA  - PT/OT/SLP continues. Activities limited by neck pain. Neurontin at 300mg TID. Pain and mobility improved with steroids.  -Xarelto for Hx Afib, Guaiac+ on 9/8, no overt bleeding noted otherwise. Cleared by GI for Xarelto to continue. Outpt follow up with Dr Olivo.  -ASA for CAD-no chest pain reported. PPM interrogated.   - Lipitor for goal LDL < 70  - GI ppx with Protonix       #Hx Unruptured aneurysm  -outpt follow up Neurosurgery  -no headache, awake and alert    #HTN  -Lopressor BID to 12.5mg as per hospitalist plan. Cardiology evaluation for hx recurrent pre-syncope. PPM interrogated.    #Anemia/thrombocytopenia  -s/p PRBC x 1 unit, on Xarelto, no overt bleeding reported. Guaiac+ noted.   -labs following  -hematology following    #Neck pain/ Cervical spine stenosis   - Neurontin at 300mg TID.   - Tylenol PRN, oxycodone prn with bowel regimen. Warm compress to neck ok. Soft discontinued.  - follow up CT Cspine-completed and shows severe stenosis.  - Neurosurgical follow as outpatient   -Orthopedic Surgery eval completed-medrol pack and modalities. Currently pain and knee weakness improved. Participates in therapy.    #Fever  -Low grade temps with elevated Lactate on 9/9, resolved.  observe Off abx as per ID.    -ID is following.      #Urinary frequency  -improved   -Flomax added by  for BPH. PVRs 140-240cc.    #gastritis  -?gastritis on CT AP-will request medicine to comment    GI/Bowel Mgmt - Senna,  Miralax, Dulcolax  /Bladder Mgmt - Voiding independently, PVRx1 and low      Precautions / PROPHYLAXIS:   - Falls, Cardiac, Seizure   - Ortho: Weight bearing status: WBAT   - Lungs: Aspiration, Incentive Spirometer   - Pressure injury/Skin: Turn Q2hrs while in bed, OOB to Chair, PT/OT    - DVT: Xarelto

## 2020-09-15 NOTE — PROGRESS NOTE ADULT - ASSESSMENT
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  He has had fevers almost since arrival at [facility].  He is being followed for anemia(CKD, ? alcohol,? heme +) and now has unexplained fever.  He has both neck pain and urgency, slow stream, and frequency although UA does not look purulent an C spine CT x 2 at [facility] negative for infection.  Differential is broad, SBE, occult C spine infection, and  infection all possible.  flow sheets reviewed and the patient is currently afebrile   white count is normal range   blood cultures are no growth   ucx reports staph epi which is likely colonized bacteria TTE  CT A/P done: moderated fecal retention and fluid distended stomach, the patient reports that he had been having several bowel movements   To date no clear source of infection to explain prior fevers  The patient completed a five day course of empiric antibiotics   Flow sheets reviewed he remains afebrile     Plan   ·	monitor off antibiotics   ·	continue supportive care per Hospitalist and Rehab MD

## 2020-09-16 LAB
ALBUMIN SERPL ELPH-MCNC: 2.7 G/DL — LOW (ref 3.3–5)
ALP SERPL-CCNC: 51 U/L — SIGNIFICANT CHANGE UP (ref 40–120)
ALT FLD-CCNC: 26 U/L — SIGNIFICANT CHANGE UP (ref 10–45)
ANION GAP SERPL CALC-SCNC: 5 MMOL/L — SIGNIFICANT CHANGE UP (ref 5–17)
AST SERPL-CCNC: 19 U/L — SIGNIFICANT CHANGE UP (ref 10–40)
BILIRUB SERPL-MCNC: 0.3 MG/DL — SIGNIFICANT CHANGE UP (ref 0.2–1.2)
BUN SERPL-MCNC: 48 MG/DL — HIGH (ref 7–23)
CALCIUM SERPL-MCNC: 9.9 MG/DL — SIGNIFICANT CHANGE UP (ref 8.4–10.5)
CHLORIDE SERPL-SCNC: 103 MMOL/L — SIGNIFICANT CHANGE UP (ref 96–108)
CO2 SERPL-SCNC: 29 MMOL/L — SIGNIFICANT CHANGE UP (ref 22–31)
CREAT SERPL-MCNC: 1.6 MG/DL — HIGH (ref 0.5–1.3)
GLUCOSE SERPL-MCNC: 99 MG/DL — SIGNIFICANT CHANGE UP (ref 70–99)
HCT VFR BLD CALC: 23.1 % — LOW (ref 39–50)
HGB BLD-MCNC: 8.2 G/DL — LOW (ref 13–17)
MCHC RBC-ENTMCNC: 34 PG — SIGNIFICANT CHANGE UP (ref 27–34)
MCHC RBC-ENTMCNC: 35.5 GM/DL — SIGNIFICANT CHANGE UP (ref 32–36)
MCV RBC AUTO: 95.9 FL — SIGNIFICANT CHANGE UP (ref 80–100)
NRBC # BLD: 0 /100 WBCS — SIGNIFICANT CHANGE UP (ref 0–0)
PLATELET # BLD AUTO: 222 K/UL — SIGNIFICANT CHANGE UP (ref 150–400)
POTASSIUM SERPL-MCNC: 4.5 MMOL/L — SIGNIFICANT CHANGE UP (ref 3.5–5.3)
POTASSIUM SERPL-SCNC: 4.5 MMOL/L — SIGNIFICANT CHANGE UP (ref 3.5–5.3)
PROT SERPL-MCNC: 6 G/DL — SIGNIFICANT CHANGE UP (ref 6–8.3)
RBC # BLD: 2.41 M/UL — LOW (ref 4.2–5.8)
RBC # FLD: 15.2 % — HIGH (ref 10.3–14.5)
SODIUM SERPL-SCNC: 137 MMOL/L — SIGNIFICANT CHANGE UP (ref 135–145)
WBC # BLD: 5.19 K/UL — SIGNIFICANT CHANGE UP (ref 3.8–10.5)
WBC # FLD AUTO: 5.19 K/UL — SIGNIFICANT CHANGE UP (ref 3.8–10.5)

## 2020-09-16 PROCEDURE — 99232 SBSQ HOSP IP/OBS MODERATE 35: CPT

## 2020-09-16 RX ORDER — OXYCODONE HYDROCHLORIDE 5 MG/1
5 TABLET ORAL EVERY 8 HOURS
Refills: 0 | Status: DISCONTINUED | OUTPATIENT
Start: 2020-09-16 | End: 2020-09-22

## 2020-09-16 RX ADMIN — Medication 100 MILLIGRAM(S): at 11:56

## 2020-09-16 RX ADMIN — Medication 1 MILLIGRAM(S): at 11:56

## 2020-09-16 RX ADMIN — Medication 650 MILLIGRAM(S): at 21:02

## 2020-09-16 RX ADMIN — OXYCODONE HYDROCHLORIDE 5 MILLIGRAM(S): 5 TABLET ORAL at 06:28

## 2020-09-16 RX ADMIN — Medication 100 MILLIGRAM(S): at 11:55

## 2020-09-16 RX ADMIN — PANTOPRAZOLE SODIUM 40 MILLIGRAM(S): 20 TABLET, DELAYED RELEASE ORAL at 06:30

## 2020-09-16 RX ADMIN — TAMSULOSIN HYDROCHLORIDE 0.4 MILLIGRAM(S): 0.4 CAPSULE ORAL at 21:23

## 2020-09-16 RX ADMIN — Medication 81 MILLIGRAM(S): at 11:55

## 2020-09-16 RX ADMIN — Medication 4 MILLIGRAM(S): at 21:23

## 2020-09-16 RX ADMIN — RIVAROXABAN 15 MILLIGRAM(S): KIT at 17:19

## 2020-09-16 RX ADMIN — Medication 4 MILLIGRAM(S): at 06:30

## 2020-09-16 RX ADMIN — Medication 650 MILLIGRAM(S): at 20:08

## 2020-09-16 RX ADMIN — OXYCODONE HYDROCHLORIDE 5 MILLIGRAM(S): 5 TABLET ORAL at 07:20

## 2020-09-16 RX ADMIN — Medication 325 MILLIGRAM(S): at 11:56

## 2020-09-16 RX ADMIN — Medication 12.5 MILLIGRAM(S): at 06:30

## 2020-09-16 RX ADMIN — GABAPENTIN 300 MILLIGRAM(S): 400 CAPSULE ORAL at 06:30

## 2020-09-16 RX ADMIN — ATORVASTATIN CALCIUM 40 MILLIGRAM(S): 80 TABLET, FILM COATED ORAL at 21:23

## 2020-09-16 RX ADMIN — SENNA PLUS 2 TABLET(S): 8.6 TABLET ORAL at 21:23

## 2020-09-16 RX ADMIN — GABAPENTIN 300 MILLIGRAM(S): 400 CAPSULE ORAL at 11:56

## 2020-09-16 RX ADMIN — OXYCODONE HYDROCHLORIDE 5 MILLIGRAM(S): 5 TABLET ORAL at 16:24

## 2020-09-16 RX ADMIN — Medication 12.5 MILLIGRAM(S): at 17:19

## 2020-09-16 RX ADMIN — GABAPENTIN 300 MILLIGRAM(S): 400 CAPSULE ORAL at 21:23

## 2020-09-16 NOTE — PROGRESS NOTE ADULT - ATTENDING COMMENTS
Patient medically and neurologically  stable. Making progress towards rehab goals.   Discharge plan discussed with team, ALIA

## 2020-09-16 NOTE — PROGRESS NOTE ADULT - ASSESSMENT
This is a 79 y/o M PMH HTN, CAD, CKD, atrial fibrillation on Xarelto, who presented to the emergency department 8/30/20 with left-sided weakness and neck pain. 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 Patient is now on the acute rehabilitation unit in [facility].     Comprehensive rehab with PT/OT/Speech per rehab team   -xray left hip reviewed - lower lumbar degeneration     #Anemia of chronic disease   -Guaiac positive - seen by GI. Heme appreciated   -Cont monitoring cbc - Hb 8.2 today - repeat in AM  -cont asa, statin, xarelto   -cont ferrous sulfate, folic acid, thiamine    #Thrombocytopenia   -WNL, cont to follow     #BPH with urinary obstruction   -Urology following   -Cont flomax    #PAF, CAD   - cont xarelto/BB    #Chronic neck pain with cervical stenosis  -Stable. cont tylenol, gabapentin   -Medrol dose pack    #CKD Stage 3  -Baseline Cr unknown  -Cr stable 1.6    #Bowel regimen  #DVT ppx  #GI ppx - ppi  #Standard precautions - aspiration, fall, safety, seizure, skin

## 2020-09-16 NOTE — PROGRESS NOTE ADULT - SUBJECTIVE AND OBJECTIVE BOX
CHIEF COMPLAINT: Improved neck pain. Improved gait and balance.       HISTORY OF PRESENT ILLNESS  78 yo male PMHx of HTN, CAD s/p stents x 3 (2005), CKD, Afib on Xarelto, s/p Medtronic PPM, myositis, presented to ED 8/30 with left weakness and neck pain. 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 Neurology and neurosurgery consulted. Per neuro evaluation, subcortical CVA suspected, ?right medullary/ pontine but this is uncertain/possibly due to small vessel disease versus cardioembolism related to atrial fibrillation. Unable to confirm with MRI- Pt with PPM that is not compatible with MRI. CTH repeated and stable. TTE EF 65%, no evidence of PFO. Pt with CT spine showing Multilevel cervical spondylosis with severe stenosis at C4-C5. Per Neurosurgery followup with Dr Douglas for aneurysm and C spine stenosis. No surgical intervention at this time. Evaluated by PMR and acute rehab recommended. (03 Sep 2020 15:20)      PAST MEDICAL & SURGICAL HISTORY:  Myositis    Hyperlipidemia    Hypertension    GI bleed    Diverticulitis    No significant past surgical history      VITALS  Vital Signs Last 24 Hrs  T(C): 36.4 (16 Sep 2020 07:47), Max: 36.8 (15 Sep 2020 21:29)  T(F): 97.5 (16 Sep 2020 07:47), Max: 98.2 (15 Sep 2020 21:29)  HR: 66 (16 Sep 2020 07:47) (66 - 91)  BP: 115/67 (16 Sep 2020 07:47) (115/67 - 140/74)  BP(mean): --  RR: 15 (16 Sep 2020 07:47) (15 - 16)  SpO2: 96% (16 Sep 2020 07:47) (96% - 98%)      PHYSICAL EXAM  Constitutional - In bed, limiting movement 2/2 neck pain  HEENT - EOMI  Neck - Supple, limited ROM 2/2 pain  Chest - CTA bilaterally  Cardiovascular - RR  Abdomen - BS+, Soft, NTND  Extremities - No C/C/E, No calf tenderness   Skin-no rash      Neurologic Exam - Awake, Alert, without new deficits.     Balance - impaired     Psychiatric - Mood stable, Affect WNL       FUNCTIONAL PROGRESS  Gait - 75ft RW min/CG  ADLs - CG A  Transfers - min A/CG  Functional transfer - min A/CG      RECENT LABS                        8.2    5.19  )-----------( 222      ( 16 Sep 2020 05:30 )             23.1     09-16    137  |  103  |  48<H>  ----------------------------<  99  4.5   |  29  |  1.60<H>    Ca    9.9      16 Sep 2020 05:30    TPro  6.0  /  Alb  2.7<L>  /  TBili  0.3  /  DBili  x   /  AST  19  /  ALT  26  /  AlkPhos  51  09-16      LIVER FUNCTIONS - ( 16 Sep 2020 05:30 )  Alb: 2.7 g/dL / Pro: 6.0 g/dL / ALK PHOS: 51 U/L / ALT: 26 U/L / AST: 19 U/L / GGT: x             Direct LDL: 85 mg/dL (08-31-20 @ 06:52)                RADIOLOGY/OTHER RESULTS      CURRENT MEDICATIONS  MEDICATIONS  (STANDING):  allopurinol 100 milliGRAM(s) Oral daily  aspirin enteric coated 81 milliGRAM(s) Oral daily  atorvastatin 40 milliGRAM(s) Oral at bedtime  bisacodyl Suppository 10 milliGRAM(s) Rectal daily  ferrous    sulfate 325 milliGRAM(s) Oral daily  folic acid 1 milliGRAM(s) Oral daily  gabapentin 300 milliGRAM(s) Oral three times a day  influenza   Vaccine 0.5 milliLiter(s) IntraMuscular once  methylPREDNISolone   Oral   methylPREDNISolone 4 milliGRAM(s) Oral before breakfast  methylPREDNISolone 4 milliGRAM(s) Oral at bedtime  metoprolol tartrate 12.5 milliGRAM(s) Oral two times a day  pantoprazole    Tablet 40 milliGRAM(s) Oral before breakfast  polyethylene glycol 3350 17 Gram(s) Oral two times a day  rivaroxaban 15 milliGRAM(s) Oral with dinner  senna 2 Tablet(s) Oral at bedtime  tamsulosin 0.4 milliGRAM(s) Oral at bedtime  thiamine 100 milliGRAM(s) Oral daily    MEDICATIONS  (PRN):  acetaminophen   Tablet .. 650 milliGRAM(s) Oral every 6 hours PRN Temp greater or equal to 38C (100.4F), Mild Pain (1 - 3)  guaiFENesin   Syrup  (Sugar-Free) 100 milliGRAM(s) Oral every 6 hours PRN Cough  oxyCODONE    IR 5 milliGRAM(s) Oral every 8 hours PRN Severe Pain (7 - 10)      ASSESSMENT & PLAN      GI/Bowel Management - Miralax, Senna, Prunes, Dulcolax as per request. GI clearance to continue AC obtained.   Management - Toilet Q2,  eval for frequency completed-Flomax, PVRs low  Skin - Turn Q2  Pain - Tylenol PRN, Oxycodone with bowel regimen, Neurontin TID at 300mg. Ortho eval completed. On medrol with pain much improved-tolerating therapy.  DVT PPX - Xarelto continues  Diet - reg    Continue comprehensive acute rehab program consisting of 3hrs/day of OT/PT and SLP.

## 2020-09-16 NOTE — PROGRESS NOTE ADULT - ASSESSMENT
79-year-old gentleman with history of hypertension, coronary artery disease, chronic kidney disease, atrial fibrillation on Xarelto, who presented to the emergency department 8/30/20 with left-sided weakness and neck pain. 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  Patient is now on the acute rehabilitation unit in [facility].  Hematology consulted for anemia/thrombocytopenia. Of note, patient has been under the hematologic care of Dr. Uribe for his anemia. He also reports history of a bone marrow evaluation years ago (no known diagnosis given). He stated he has a history of intermittently guaiac-positive stools (most recently last month), and is under the gastrointestinal care of Dr. Olivo. Plans are for eventual followup endoscopic evaluation.

## 2020-09-16 NOTE — PROGRESS NOTE ADULT - SUBJECTIVE AND OBJECTIVE BOX
Patient is a 81 y/o M PMH hypertension, coronary artery disease, chronic kidney disease, atrial fibrillation on Xarelto, who presented to the emergency department 8/30/20 with left-sided weakness and neck pain. Found to have a right CVA, now on the acute rehabilitation unit in [facility]. Hematology consulted for anemia/thrombocytopenia. Follows with Dr. Uribe for anemia. He also reports history of a bone marrow evaluation years ago (no known diagnosis given). He stated he has a history of intermittently guaiac-positive stools (most recently last month), and is under the gastrointestinal care of Dr. Olivo. Plans were for eventual followup endoscopic evaluation.    Patient seen and examined at bedside, NAD. Has occasional back, hip pain, improved with current pain meds.    ALLERGIES:  No Known Allergies    MEDICATIONS  (STANDING):  allopurinol 100 milliGRAM(s) Oral daily  aspirin enteric coated 81 milliGRAM(s) Oral daily  atorvastatin 40 milliGRAM(s) Oral at bedtime  bisacodyl Suppository 10 milliGRAM(s) Rectal daily  ferrous    sulfate 325 milliGRAM(s) Oral daily  folic acid 1 milliGRAM(s) Oral daily  gabapentin 300 milliGRAM(s) Oral three times a day  influenza   Vaccine 0.5 milliLiter(s) IntraMuscular once  methylPREDNISolone   Oral   methylPREDNISolone 4 milliGRAM(s) Oral before breakfast  methylPREDNISolone 4 milliGRAM(s) Oral at bedtime  metoprolol tartrate 12.5 milliGRAM(s) Oral two times a day  pantoprazole    Tablet 40 milliGRAM(s) Oral before breakfast  polyethylene glycol 3350 17 Gram(s) Oral two times a day  rivaroxaban 15 milliGRAM(s) Oral with dinner  senna 2 Tablet(s) Oral at bedtime  tamsulosin 0.4 milliGRAM(s) Oral at bedtime  thiamine 100 milliGRAM(s) Oral daily    MEDICATIONS  (PRN):  acetaminophen   Tablet .. 650 milliGRAM(s) Oral every 6 hours PRN Temp greater or equal to 38C (100.4F), Mild Pain (1 - 3)  guaiFENesin   Syrup  (Sugar-Free) 100 milliGRAM(s) Oral every 6 hours PRN Cough  oxyCODONE    IR 5 milliGRAM(s) Oral every 8 hours PRN Severe Pain (7 - 10)    Vital Signs Last 24 Hrs  T(F): 97.5 (16 Sep 2020 07:47), Max: 98.2 (15 Sep 2020 21:29)  HR: 66 (16 Sep 2020 07:47) (66 - 91)  BP: 115/67 (16 Sep 2020 07:47) (115/67 - 140/74)  RR: 15 (16 Sep 2020 07:47) (15 - 16)  SpO2: 96% (16 Sep 2020 07:47) (96% - 98%)  I&O's Summary    15 Sep 2020 07:01  -  16 Sep 2020 07:00  --------------------------------------------------------  IN: 0 mL / OUT: 750 mL / NET: -750 mL    16 Sep 2020 07:01  -  16 Sep 2020 14:57  --------------------------------------------------------  IN: 0 mL / OUT: 380 mL / NET: -380 mL    PHYSICAL EXAM:  General: NAD, pleasant  ENT: MMM, no scleral icterus  Neck: Supple, No JVD  Lungs: respirations non labored, clear to auscultation bilaterally, no wheezes, rales, rhonchi  Cardio: RRR, S1/S2, No murmurs  Abdomen: Soft, Nontender, Nondistended; Bowel sounds present  Extremities: No calf tenderness, No pitting edema    LABS:                        8.2    5.19  )-----------( 222      ( 16 Sep 2020 05:30 )             23.1       09-16    137  |  103  |  48  ----------------------------<  99  4.5   |  29  |  1.60    Ca    9.9      16 Sep 2020 05:30    TPro  6.0  /  Alb  2.7  /  TBili  0.3  /  DBili  x   /  AST  19  /  ALT  26  /  AlkPhos  51  09-16     eGFR if Non African American: 40 mL/min/1.73M2 (09-16-20 @ 05:30)  eGFR if African American: 47 mL/min/1.73M2 (09-16-20 @ 05:30)    08-31 Chol 152 mg/dL LDL 85 mg/dL HDL 48 mg/dL Trig 156 mg/dL    RADIOLOGY & ADDITIONAL TESTS:    Care Discussed with Consultants/Other Providers: yes

## 2020-09-17 LAB
BASOPHILS # BLD AUTO: 0.03 K/UL — SIGNIFICANT CHANGE UP (ref 0–0.2)
BASOPHILS NFR BLD AUTO: 0.5 % — SIGNIFICANT CHANGE UP (ref 0–2)
EOSINOPHIL # BLD AUTO: 0.02 K/UL — SIGNIFICANT CHANGE UP (ref 0–0.5)
EOSINOPHIL NFR BLD AUTO: 0.3 % — SIGNIFICANT CHANGE UP (ref 0–6)
HCT VFR BLD CALC: 21.9 % — LOW (ref 39–50)
HGB BLD-MCNC: 7.7 G/DL — LOW (ref 13–17)
IMM GRANULOCYTES NFR BLD AUTO: 0.3 % — SIGNIFICANT CHANGE UP (ref 0–1.5)
LYMPHOCYTES # BLD AUTO: 1 K/UL — SIGNIFICANT CHANGE UP (ref 1–3.3)
LYMPHOCYTES # BLD AUTO: 17.3 % — SIGNIFICANT CHANGE UP (ref 13–44)
MCHC RBC-ENTMCNC: 33.9 PG — SIGNIFICANT CHANGE UP (ref 27–34)
MCHC RBC-ENTMCNC: 35.2 GM/DL — SIGNIFICANT CHANGE UP (ref 32–36)
MCV RBC AUTO: 96.5 FL — SIGNIFICANT CHANGE UP (ref 80–100)
MONOCYTES # BLD AUTO: 0.56 K/UL — SIGNIFICANT CHANGE UP (ref 0–0.9)
MONOCYTES NFR BLD AUTO: 9.7 % — SIGNIFICANT CHANGE UP (ref 2–14)
NEUTROPHILS # BLD AUTO: 4.15 K/UL — SIGNIFICANT CHANGE UP (ref 1.8–7.4)
NEUTROPHILS NFR BLD AUTO: 71.9 % — SIGNIFICANT CHANGE UP (ref 43–77)
NRBC # BLD: 0 /100 WBCS — SIGNIFICANT CHANGE UP (ref 0–0)
PLATELET # BLD AUTO: 226 K/UL — SIGNIFICANT CHANGE UP (ref 150–400)
RBC # BLD: 2.27 M/UL — LOW (ref 4.2–5.8)
RBC # FLD: 15.4 % — HIGH (ref 10.3–14.5)
WBC # BLD: 5.78 K/UL — SIGNIFICANT CHANGE UP (ref 3.8–10.5)
WBC # FLD AUTO: 5.78 K/UL — SIGNIFICANT CHANGE UP (ref 3.8–10.5)

## 2020-09-17 PROCEDURE — 99232 SBSQ HOSP IP/OBS MODERATE 35: CPT

## 2020-09-17 RX ADMIN — Medication 650 MILLIGRAM(S): at 05:52

## 2020-09-17 RX ADMIN — TAMSULOSIN HYDROCHLORIDE 0.4 MILLIGRAM(S): 0.4 CAPSULE ORAL at 21:48

## 2020-09-17 RX ADMIN — Medication 12.5 MILLIGRAM(S): at 19:21

## 2020-09-17 RX ADMIN — Medication 650 MILLIGRAM(S): at 12:08

## 2020-09-17 RX ADMIN — RIVAROXABAN 15 MILLIGRAM(S): KIT at 19:21

## 2020-09-17 RX ADMIN — OXYCODONE HYDROCHLORIDE 5 MILLIGRAM(S): 5 TABLET ORAL at 20:59

## 2020-09-17 RX ADMIN — GABAPENTIN 300 MILLIGRAM(S): 400 CAPSULE ORAL at 21:48

## 2020-09-17 RX ADMIN — GABAPENTIN 300 MILLIGRAM(S): 400 CAPSULE ORAL at 06:42

## 2020-09-17 RX ADMIN — SENNA PLUS 2 TABLET(S): 8.6 TABLET ORAL at 21:48

## 2020-09-17 RX ADMIN — PANTOPRAZOLE SODIUM 40 MILLIGRAM(S): 20 TABLET, DELAYED RELEASE ORAL at 06:42

## 2020-09-17 RX ADMIN — OXYCODONE HYDROCHLORIDE 5 MILLIGRAM(S): 5 TABLET ORAL at 19:26

## 2020-09-17 RX ADMIN — Medication 650 MILLIGRAM(S): at 04:59

## 2020-09-17 RX ADMIN — GABAPENTIN 300 MILLIGRAM(S): 400 CAPSULE ORAL at 13:47

## 2020-09-17 RX ADMIN — ATORVASTATIN CALCIUM 40 MILLIGRAM(S): 80 TABLET, FILM COATED ORAL at 21:48

## 2020-09-17 RX ADMIN — Medication 4 MILLIGRAM(S): at 06:42

## 2020-09-17 RX ADMIN — POLYETHYLENE GLYCOL 3350 17 GRAM(S): 17 POWDER, FOR SOLUTION ORAL at 19:22

## 2020-09-17 RX ADMIN — POLYETHYLENE GLYCOL 3350 17 GRAM(S): 17 POWDER, FOR SOLUTION ORAL at 06:44

## 2020-09-17 NOTE — PROGRESS NOTE ADULT - SUBJECTIVE AND OBJECTIVE BOX
HPI:  80 yo male PMHx of HTN, CAD s/p stents x 3 (2005), CKD, Afib on Xarelto, s/p Medtronic PPM, myositis, presented to ED 8/30 with left weakness and neck pain. 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 Neurology and neurosurgery consulted. Per neuro evaluation, subcortical CVA suspected, ?right medullary/ pontine but this is uncertain/possibly due to small vessel disease versus cardioembolism related to atrial fibrillation. Unable to confirm with MRI- Pt with PPM that is not compatible with MRI. CTH repeated and stable. TTE EF 65%, no evidence of PFO. Pt with CT spine showing Multilevel cervical spondylosis with severe stenosis at C4-C5. Per Neurosurgery followup with Dr Douglas for aneurysm and C spine stenosis. No surgical intervention at this time. Evaluated by PMR and acute rehab recommended. (03 Sep 2020 15:20)      Subjective    Neck pain better.     PAST MEDICAL & SURGICAL HISTORY:  Myositis    Hyperlipidemia    Hypertension    GI bleed    Diverticulitis    No significant past surgical history        MedsMEDICATIONS  (STANDING):  allopurinol 100 milliGRAM(s) Oral daily  aspirin enteric coated 81 milliGRAM(s) Oral daily  atorvastatin 40 milliGRAM(s) Oral at bedtime  bisacodyl Suppository 10 milliGRAM(s) Rectal daily  ferrous    sulfate 325 milliGRAM(s) Oral daily  folic acid 1 milliGRAM(s) Oral daily  gabapentin 300 milliGRAM(s) Oral three times a day  influenza   Vaccine 0.5 milliLiter(s) IntraMuscular once  metoprolol tartrate 12.5 milliGRAM(s) Oral two times a day  pantoprazole    Tablet 40 milliGRAM(s) Oral before breakfast  polyethylene glycol 3350 17 Gram(s) Oral two times a day  rivaroxaban 15 milliGRAM(s) Oral with dinner  senna 2 Tablet(s) Oral at bedtime  tamsulosin 0.4 milliGRAM(s) Oral at bedtime  thiamine 100 milliGRAM(s) Oral daily    MEDICATIONS  (PRN):  acetaminophen   Tablet .. 650 milliGRAM(s) Oral every 6 hours PRN Temp greater or equal to 38C (100.4F), Mild Pain (1 - 3)  guaiFENesin   Syrup  (Sugar-Free) 100 milliGRAM(s) Oral every 6 hours PRN Cough  oxyCODONE    IR 5 milliGRAM(s) Oral every 8 hours PRN Severe Pain (7 - 10)      Vital Signs Last 24 Hrs  T(C): 36.8 (16 Sep 2020 20:10), Max: 36.8 (16 Sep 2020 20:10)  T(F): 98.2 (16 Sep 2020 20:10), Max: 98.2 (16 Sep 2020 20:10)  HR: 95 (17 Sep 2020 06:45) (78 - 95)  BP: 116/73 (17 Sep 2020 06:45) (115/70 - 116/73)  BP(mean): --  RR: 16 (16 Sep 2020 20:10) (16 - 16)  SpO2: 98% (16 Sep 2020 20:10) (98% - 98%)  I&O's Summary    16 Sep 2020 07:01  -  17 Sep 2020 07:00  --------------------------------------------------------  IN: 0 mL / OUT: 1380 mL / NET: -1380 mL        PHYSICAL EXAM:  GENERAL: NAD  NECK: Supple  NERVOUS SYSTEM:  awake and alert  HEART: S1s2 NL , RRR  CHEST/LUNG: Clear to percussion bilaterally  ABDOMEN: Soft, Nontender, Nondistended; Bowel sounds present  EXTREMITIES:  No edema      LABS:(09-16 @ 05:30)                      8.2  5.19 )-----------( 222                 23.1    Neutrophils = -- (--%)  Lymphocytes = -- (--%)  Eosinophils = -- (--%)  Basophils = -- (--%)  Monocytes = -- (--%)  Bands = --%    09-16    137  |  103  |  48<H>  ----------------------------<  99  4.5   |  29  |  1.60<H>    Ca    9.9      16 Sep 2020 05:30    TPro  6.0  /  Alb  2.7<L>  /  TBili  0.3  /  DBili  x   /  AST  19  /  ALT  26  /  AlkPhos  51  09-16        Care Discussed with Consultants/Other Providers [ x] YES  [ ] NO        PT/OT per rehab  ASA/Statin/Xarelto    presyncope  Resolved    Anemia  Monitor h/h  Iron  Guaiac pos-GI seen pt    Thrombocytopenia  Resolved    PAF  Xarelto/BB    Neck pain with cervical stenosis not new  Pt declined lidoderm  APAP/gabapentin  Medrol dose pack completed 9/16    CKD3  Baseline Cr unknown  Ct stable  Monitor for now    fever 9/8 night  UA neg for UTI(no pyuria). urine c/s staph likely contaminant  CXR neg   Lactic acidosis resolved with IVF  Pt had neg fever w/u on 9/4 also  Elev PCT. D-dimer neg  ECHO and CT abd neg for infection  Completed 5 day course of Abx on 9/13  Monitor for now

## 2020-09-17 NOTE — PROGRESS NOTE ADULT - SUBJECTIVE AND OBJECTIVE BOX
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 Neurology and neurosurgery consulted. Per neuro evaluation, subcortical CVA suspected, ?right medullary/ pontine but this is uncertain/possibly due to small vessel disease versus cardioembolism related to atrial fibrillation. Unable to confirm with MRI- Pt with PPM that is not compatible with MRI. CTH repeated and stable. TTE EF 65%, no evidence of PFO. Pt with CT spine showing Multilevel cervical spondylosis with severe stenosis at C4-C5. Per Neurosurgery followup with Dr Douglas for aneurysm and C spine stenosis. No surgical intervention at this time. Evaluated by PMR and acute rehab recommended. (03 Sep 2020 15:20)    voiding better since flomax started    Interval Events:  Patient seen and examined at bedside.    MEDICATIONS:  MEDICATIONS  (STANDING):  allopurinol 100 milliGRAM(s) Oral daily  aspirin enteric coated 81 milliGRAM(s) Oral daily  atorvastatin 40 milliGRAM(s) Oral at bedtime  bisacodyl Suppository 10 milliGRAM(s) Rectal daily  ferrous    sulfate 325 milliGRAM(s) Oral daily  folic acid 1 milliGRAM(s) Oral daily  gabapentin 300 milliGRAM(s) Oral three times a day  influenza   Vaccine 0.5 milliLiter(s) IntraMuscular once  metoprolol tartrate 12.5 milliGRAM(s) Oral two times a day  pantoprazole    Tablet 40 milliGRAM(s) Oral before breakfast  polyethylene glycol 3350 17 Gram(s) Oral two times a day  rivaroxaban 15 milliGRAM(s) Oral with dinner  senna 2 Tablet(s) Oral at bedtime  tamsulosin 0.4 milliGRAM(s) Oral at bedtime  thiamine 100 milliGRAM(s) Oral daily    MEDICATIONS  (PRN):  acetaminophen   Tablet .. 650 milliGRAM(s) Oral every 6 hours PRN Temp greater or equal to 38C (100.4F), Mild Pain (1 - 3)  guaiFENesin   Syrup  (Sugar-Free) 100 milliGRAM(s) Oral every 6 hours PRN Cough  oxyCODONE    IR 5 milliGRAM(s) Oral every 8 hours PRN Severe Pain (7 - 10)      Allergies    No Known Allergies    Intolerances        T(C): 36.8 (09-16-20 @ 20:10), Max: 36.8 (09-15-20 @ 21:29)  T(F): 98.2 (09-16-20 @ 20:10), Max: 98.2 (09-15-20 @ 21:29)  HR: 95 (09-17-20 @ 06:45) (66 - 95)  BP: 116/73 (09-17-20 @ 06:45) (115/67 - 140/74)  RR: 16 (09-16-20 @ 20:10) (15 - 16)  SpO2: 98% (09-16-20 @ 20:10) (96% - 98%)              LABS:      CBC Full  -  ( 16 Sep 2020 05:30 )  WBC Count : 5.19 K/uL  RBC Count : 2.41 M/uL  Hemoglobin : 8.2 g/dL  Hematocrit : 23.1 %  Platelet Count - Automated : 222 K/uL  Mean Cell Volume : 95.9 fl  Mean Cell Hemoglobin : 34.0 pg  Mean Cell Hemoglobin Concentration : 35.5 gm/dL  Auto Neutrophil # : x  Auto Lymphocyte # : x  Auto Monocyte # : x  Auto Eosinophil # : x  Auto Basophil # : x  Auto Neutrophil % : x  Auto Lymphocyte % : x  Auto Monocyte % : x  Auto Eosinophil % : x  Auto Basophil % : x    09-16    137  |  103  |  48<H>  ----------------------------<  99  4.5   |  29  |  1.60<H>    Ca    9.9      16 Sep 2020 05:30    TPro  6.0  /  Alb  2.7<L>  /  TBili  0.3  /  DBili  x   /  AST  19  /  ALT  26  /  AlkPhos  51  09-16                  Physical Exam    Constitutional: alert, no acute distress    Abdomen: soft, nontender, nondistended, no HSM    Genitourinary: no bladder distention

## 2020-09-17 NOTE — PROGRESS NOTE ADULT - SUBJECTIVE AND OBJECTIVE BOX
CC: f/u for fever    Patient reports: no recent fever and no complaints    REVIEW OF SYSTEMS:  All other review of systems negative (Comprehensive ROS)    Antimicrobials Day #  :Off    Other Medications Reviewed    T(F): 98.2 (09-16-20 @ 20:10), Max: 98.2 (09-16-20 @ 20:10)  HR: 95 (09-17-20 @ 06:45)  BP: 116/73 (09-17-20 @ 06:45)  RR: 16 (09-16-20 @ 20:10)  SpO2: 98% (09-16-20 @ 20:10)  Wt(kg): --    PHYSICAL EXAM:  General: alert, no acute distress  Eyes:  anicteric, no conjunctival injection, no discharge  Oropharynx: no lesions or injection 	  Neck: supple, without adenopathy  Lungs: clear to auscultation  Heart: irregular rate and rhythm; no murmur, rubs or gallops  Abdomen: soft, nondistended, nontender, without mass or organomegaly  Skin: no lesions  Extremities: no clubbing, cyanosis, or edema  Neurologic: alert, oriented, moves all extremities, left side is weak    LAB RESULTS:                        8.2    5.19  )-----------( 222      ( 16 Sep 2020 05:30 )             23.1     09-16    137  |  103  |  48<H>  ----------------------------<  99  4.5   |  29  |  1.60<H>    Ca    9.9      16 Sep 2020 05:30    TPro  6.0  /  Alb  2.7<L>  /  TBili  0.3  /  DBili  x   /  AST  19  /  ALT  26  /  AlkPhos  51  09-16    LIVER FUNCTIONS - ( 16 Sep 2020 05:30 )  Alb: 2.7 g/dL / Pro: 6.0 g/dL / ALK PHOS: 51 U/L / ALT: 26 U/L / AST: 19 U/L / GGT: x             MICROBIOLOGY:  RECENT CULTURES:      RADIOLOGY REVIEWED:    < from: CT Abdomen and Pelvis No Cont (09.11.20 @ 16:34) >    IMPRESSION:    Fluid distended stomach, correlate clinically for gastritis or peptic ulcer disease.    Moderate fecal retention without bowel obstruction.    Other findings as discussed.    < end of copied text >  < from: Xray Hip 2-3 Views, Left (09.15.20 @ 16:04) >  IMPRESSION: Lower lumbar degeneration.    < end of copied text >

## 2020-09-17 NOTE — PROGRESS NOTE ADULT - ASSESSMENT
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  He has had fevers almost since arrival at [facility].  He is being followed for anemia(CKD, ? alcohol,? heme +) and now has unexplained fever.  He has both neck pain and urgency, slow stream, and frequency although UA does not look purulent an C spine CT x 2 at [facility] negative for infection.  Differential is broad, SBE, occult C spine infection, and  infection all possible.  Fever resolved promptly after antibiotics started  white count is normal range   blood cultures are no growth   ucx reports staph epi which is likely colonized bacteria TTE  CT A/P done: moderated fecal retention and fluid distended stomach, the patient reports that he had been having several bowel movements   To date no clear source of infection to explain prior fevers  The patient completed a five day course of empiric antibiotics   He has been afebrile off antibiotics  No new complaints  Plan   ·	monitor off antibiotics   ·	continue supportive care per Hospitalist and Rehab MD   ·	No additional ID w/u is planned, we will therefor stop actively following, please call if ID issues arise

## 2020-09-17 NOTE — PROGRESS NOTE ADULT - SUBJECTIVE AND OBJECTIVE BOX
NP  Medicine GI Progress Note    78 yo male PMHx of HTN, CAD s/p stents x 3 (2005), CKD, Afib on Xarelto, s/p Medtronic PPM, myositis, presented to ED 8/30 with left weakness and neck pain. 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 Neurology and neurosurgery consulted. Per neuro evaluation, subcortical CVA suspected, ?right medullary/ pontine but this is uncertain/possibly due to small vessel disease versus cardio embolism related to atrial fibrillation. Unable to confirm with MRI- Pt with PPM that is not compatible with MRI. CTH repeated and stable. TTE EF 65%, no evidence of PFO. Pt with CT spine showing Multilevel cervical spondylosis with severe stenosis at C4-C5. Per Neurosurgery followup with Dr Douglas for aneurysm and C spine stenosis. No surgical intervention at this time. Evaluated by PMR and acute rehab recommended. GI following for Anemia       Allergies    No Known Allergies    Intolerances      MEDICATIONS:  MEDICATIONS  (STANDING):  allopurinol 100 milliGRAM(s) Oral daily  aspirin enteric coated 81 milliGRAM(s) Oral daily  atorvastatin 40 milliGRAM(s) Oral at bedtime  bisacodyl Suppository 10 milliGRAM(s) Rectal daily  ferrous    sulfate 325 milliGRAM(s) Oral daily  folic acid 1 milliGRAM(s) Oral daily  gabapentin 300 milliGRAM(s) Oral three times a day  influenza   Vaccine 0.5 milliLiter(s) IntraMuscular once  metoprolol tartrate 12.5 milliGRAM(s) Oral two times a day  pantoprazole    Tablet 40 milliGRAM(s) Oral before breakfast  polyethylene glycol 3350 17 Gram(s) Oral two times a day  rivaroxaban 15 milliGRAM(s) Oral with dinner  senna 2 Tablet(s) Oral at bedtime  tamsulosin 0.4 milliGRAM(s) Oral at bedtime  thiamine 100 milliGRAM(s) Oral daily    MEDICATIONS  (PRN):  acetaminophen   Tablet .. 650 milliGRAM(s) Oral every 6 hours PRN Temp greater or equal to 38C (100.4F), Mild Pain (1 - 3)  guaiFENesin   Syrup  (Sugar-Free) 100 milliGRAM(s) Oral every 6 hours PRN Cough  oxyCODONE    IR 5 milliGRAM(s) Oral every 8 hours PRN Severe Pain (7 - 10)    Vital Signs Last 24 Hrs  T(C): 36.8 (16 Sep 2020 20:10), Max: 36.8 (16 Sep 2020 20:10)  T(F): 98.2 (16 Sep 2020 20:10), Max: 98.2 (16 Sep 2020 20:10)  HR: 95 (17 Sep 2020 06:45) (78 - 95)  BP: 116/73 (17 Sep 2020 06:45) (115/70 - 116/73)  BP(mean): --  RR: 16 (16 Sep 2020 20:10) (16 - 16)  SpO2: 98% (16 Sep 2020 20:10) (98% - 98%)    09-16 @ 07:01  -  09-17 @ 07:00  --------------------------------------------------------  IN: 0 mL / OUT: 1380 mL / NET: -1380 mL        I&O's Summary    16 Sep 2020 07:01  -  17 Sep 2020 07:00  --------------------------------------------------------  IN: 0 mL / OUT: 1380 mL / NET: -1380 mL        PHYSICAL EXAM:  General: Well developed; thin, in no acute distress  HEENT: MMM, conjunctiva and sclera clear, neck brace in place  Gastrointestinal: Soft, non-tender non-distended; Normal bowel sounds; No rebound or guarding  Extremities: No clubbing, cyanosis or edema  Neurological: Alert and oriented x3  Skin: Warm and dry. No obvious rash    LABS:                        8.2    5.19  )-----------( 222      ( 16 Sep 2020 05:30 )             23.1     09-16    137  |  103  |  48<H>  ----------------------------<  99  4.5   |  29  |  1.60<H>    Ca    9.9      16 Sep 2020 05:30    TPro  6.0  /  Alb  2.7<L>  /  TBili  0.3  /  DBili  x   /  AST  19  /  ALT  26  /  AlkPhos  51  09-16    RADIOLOGY & ADDITIONAL STUDIES:    Assessment and Plan:  78 yo male PMHx of HTN, CAD s/p stents x 3 (2005), CKD, Afib on Xarelto, s/p Medtronic PPM, myositis, presented to ED 8/30 with left weakness and neck pain. 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 Neurology and neurosurgery consulted. Per neuro evaluation, subcortical CVA suspected, ?right medullary/ pontine but this is uncertain/possibly due to small vessel disease versus cardio embolism related to atrial fibrillation. Unable to confirm with MRI- Pt with PPM that is not compatible with MRI. CTH repeated and stable. TTE EF 65%, no evidence of PFO. Pt with CT spine showing Multilevel cervical spondylosis with severe stenosis at C4-C5. Per Neurosurgery followup with Dr Douglas for aneurysm and C spine stenosis. No surgical intervention at this time. Evaluated by PMR and acute rehab recommended. GI following for Anemia     #Anemia  1. Anemia known to be chronic   2. H/H 8.2/23.1 (9/16/20)  3. STAT cbc ordered    4. Monitor H/H  5. c/w anticoagulants   6. Pt to follow up with outpatient GI upon D/C    Aggie Cedillo NP  Department of Gastroenterology   GI cell: [phone]

## 2020-09-17 NOTE — PROGRESS NOTE ADULT - ATTENDING COMMENTS
Patient seen and examined.  Agree with assessment and plan as above.    No acute complaints today.  No abdominal pain.  He is tolerating PO without difficulty.    VSS.  Abdomen soft, nontender    Hgb/Hct stable at present, periodically monitor  Endoscopic workup with private GI after discharge

## 2020-09-17 NOTE — PROGRESS NOTE ADULT - ATTENDING COMMENTS
Patient medically and neurologically stable. Making progress towards rehab goals.     Medicine, , ID input appreciated , case discussed

## 2020-09-17 NOTE — PROGRESS NOTE ADULT - ASSESSMENT
EVERARDO FIGUEROA is a 78yo Male with possible right sided subcortical CVA with left sided weakness and with decreased functional mobility, gait instability and ADL impairments.    - COMORBIDITES/ACTIVE MEDICAL ISSUES     Gait Instability, ADL impairments and Functional impairments:  Comprehensive Rehab Program of PT/OT/SLP-tolerating therapy. family training planned. Progress and d/c plan discussed with wife on 9/14-questions answered, grateful for care.    #CVA  - PT/OT/SLP continues. Activities limited by neck pain. Neurontin at 300mg TID. Pain and mobility improved with steroids.  -Xarelto for Hx Afib, Guaiac+ on 9/8, no overt bleeding noted otherwise. Cleared by GI for Xarelto to continue. Outpt follow up with Dr Olivo.  -ASA for CAD-no chest pain reported. PPM interrogated.   - Lipitor for goal LDL < 70  - GI ppx with Protonix       #Hx Unruptured aneurysm  -outpt follow up Neurosurgery  -no headache, awake and alert    #HTN  -Lopressor BID to 12.5mg as per hospitalist plan. Cardiology evaluation for hx recurrent pre-syncope. PPM interrogated.    #Anemia/thrombocytopenia  -s/p PRBC x 1 unit, on Xarelto, no overt bleeding reported. Guaiac+ noted.   -labs following  -hematology following    #Neck pain/ Cervical spine stenosis   - Neurontin at 300mg TID.   - Tylenol PRN, oxycodone prn with bowel regimen. Warm compress to neck ok. Soft discontinued.  - follow up CT Cspine-completed and shows severe stenosis.  - Neurosurgical follow as outpatient   -Orthopedic Surgery eval completed-medrol pack and modalities. Currently pain and knee weakness improved. Participates in therapy.    #Fever  -Low grade temps with elevated Lactate on 9/9, resolved.  observe Off abx as per ID.    -ID is following.      #Urinary frequency  -improved   -Flomax added by  for BPH. PVRs 140-240cc.    #gastritis  -?gastritis on CT AP-will request medicine to comment  -asymptomatic    GI/Bowel Mgmt - Senna,  Miralax, Dulcolax  /Bladder Mgmt - Voiding independently, PVRx1 and low      Precautions / PROPHYLAXIS:   - Falls, Cardiac, Seizure   - Ortho: Weight bearing status: WBAT   - Lungs: Aspiration, Incentive Spirometer   - Pressure injury/Skin: Turn Q2hrs while in bed, OOB to Chair, PT/OT    - DVT: Xarelto

## 2020-09-17 NOTE — PROGRESS NOTE ADULT - SUBJECTIVE AND OBJECTIVE BOX
CHIEF COMPLAINT: Improved neck pain and functional deficits. Night uneventful.      HISTORY OF PRESENT ILLNESS  78 yo male PMHx of HTN, CAD s/p stents x 3 (2005), CKD, Afib on Xarelto, s/p Medtronic PPM, myositis, presented to ED 8/30 with left weakness and neck pain. 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 Neurology and neurosurgery consulted. Per neuro evaluation, subcortical CVA suspected, ?right medullary/ pontine but this is uncertain/possibly due to small vessel disease versus cardioembolism related to atrial fibrillation. Unable to confirm with MRI- Pt with PPM that is not compatible with MRI. CTH repeated and stable. TTE EF 65%, no evidence of PFO. Pt with CT spine showing Multilevel cervical spondylosis with severe stenosis at C4-C5. Per Neurosurgery followup with Dr Douglas for aneurysm and C spine stenosis. No surgical intervention at this time. Evaluated by PMR and acute rehab recommended. (03 Sep 2020 15:20)      PAST MEDICAL & SURGICAL HISTORY:  Myositis    Hyperlipidemia    Hypertension    GI bleed    Diverticulitis    No significant past surgical history      VITALS  Vital Signs Last 24 Hrs  T(C): 36.8 (16 Sep 2020 20:10), Max: 36.8 (16 Sep 2020 20:10)  T(F): 98.2 (16 Sep 2020 20:10), Max: 98.2 (16 Sep 2020 20:10)  HR: 95 (17 Sep 2020 06:45) (78 - 95)  BP: 116/73 (17 Sep 2020 06:45) (115/70 - 116/73)  BP(mean): --  RR: 16 (16 Sep 2020 20:10) (16 - 16)  SpO2: 98% (16 Sep 2020 20:10) (98% - 98%)    PHYSICAL EXAM  Constitutional - In bed, limiting movement 2/2 neck pain  HEENT - EOMI  Neck - Supple, limited ROM 2/2 pain  Chest - CTA bilaterally  Cardiovascular - RR  Abdomen - BS+, Soft, NTND  Extremities - No C/C/E, No calf tenderness   Skin-no rash      Neurologic Exam - Awake, Alert, without new deficits.     Balance - impaired     Psychiatric - Mood stable, Affect WNL       FUNCTIONAL PROGRESS  Gait - 75ft RW min/CG  ADLs - CG A  Transfers - min A/CG  Functional transfer - min A/CG    RECENT LABS                        8.2    5.19  )-----------( 222      ( 16 Sep 2020 05:30 )             23.1     09-16    137  |  103  |  48<H>  ----------------------------<  99  4.5   |  29  |  1.60<H>    Ca    9.9      16 Sep 2020 05:30    TPro  6.0  /  Alb  2.7<L>  /  TBili  0.3  /  DBili  x   /  AST  19  /  ALT  26  /  AlkPhos  51  09-16      LIVER FUNCTIONS - ( 16 Sep 2020 05:30 )  Alb: 2.7 g/dL / Pro: 6.0 g/dL / ALK PHOS: 51 U/L / ALT: 26 U/L / AST: 19 U/L / GGT: x             Direct LDL: 85 mg/dL (08-31-20 @ 06:52)                RADIOLOGY/OTHER RESULTS      CURRENT MEDICATIONS  MEDICATIONS  (STANDING):  allopurinol 100 milliGRAM(s) Oral daily  aspirin enteric coated 81 milliGRAM(s) Oral daily  atorvastatin 40 milliGRAM(s) Oral at bedtime  bisacodyl Suppository 10 milliGRAM(s) Rectal daily  ferrous    sulfate 325 milliGRAM(s) Oral daily  folic acid 1 milliGRAM(s) Oral daily  gabapentin 300 milliGRAM(s) Oral three times a day  influenza   Vaccine 0.5 milliLiter(s) IntraMuscular once  metoprolol tartrate 12.5 milliGRAM(s) Oral two times a day  pantoprazole    Tablet 40 milliGRAM(s) Oral before breakfast  polyethylene glycol 3350 17 Gram(s) Oral two times a day  rivaroxaban 15 milliGRAM(s) Oral with dinner  senna 2 Tablet(s) Oral at bedtime  tamsulosin 0.4 milliGRAM(s) Oral at bedtime  thiamine 100 milliGRAM(s) Oral daily    MEDICATIONS  (PRN):  acetaminophen   Tablet .. 650 milliGRAM(s) Oral every 6 hours PRN Temp greater or equal to 38C (100.4F), Mild Pain (1 - 3)  guaiFENesin   Syrup  (Sugar-Free) 100 milliGRAM(s) Oral every 6 hours PRN Cough  oxyCODONE    IR 5 milliGRAM(s) Oral every 8 hours PRN Severe Pain (7 - 10)      ASSESSMENT & PLAN    GI/Bowel Management - Miralax, Senna, Prunes, Dulcolax as per request. GI clearance to continue AC obtained.   Management - Toilet Q2,  eval for frequency completed-Flomax, PVRs low  Skin - Turn Q2  Pain - Tylenol PRN, Oxycodone with bowel regimen, Neurontin TID at 300mg. Ortho eval completed. On medrol with pain much improved-tolerating therapy.  DVT PPX - Xarelto continues  Diet - reg    Continue comprehensive acute rehab program consisting of 3hrs/day of OT/PT and SLP.

## 2020-09-18 LAB
ALBUMIN SERPL ELPH-MCNC: 2.6 G/DL — LOW (ref 3.3–5)
ALP SERPL-CCNC: 47 U/L — SIGNIFICANT CHANGE UP (ref 40–120)
ALT FLD-CCNC: 22 U/L — SIGNIFICANT CHANGE UP (ref 10–45)
ANION GAP SERPL CALC-SCNC: 7 MMOL/L — SIGNIFICANT CHANGE UP (ref 5–17)
AST SERPL-CCNC: 12 U/L — SIGNIFICANT CHANGE UP (ref 10–40)
BILIRUB SERPL-MCNC: 0.2 MG/DL — SIGNIFICANT CHANGE UP (ref 0.2–1.2)
BLD GP AB SCN SERPL QL: SIGNIFICANT CHANGE UP
BUN SERPL-MCNC: 60 MG/DL — HIGH (ref 7–23)
CALCIUM SERPL-MCNC: 9.7 MG/DL — SIGNIFICANT CHANGE UP (ref 8.4–10.5)
CHLORIDE SERPL-SCNC: 106 MMOL/L — SIGNIFICANT CHANGE UP (ref 96–108)
CO2 SERPL-SCNC: 27 MMOL/L — SIGNIFICANT CHANGE UP (ref 22–31)
CREAT SERPL-MCNC: 1.7 MG/DL — HIGH (ref 0.5–1.3)
GLUCOSE SERPL-MCNC: 98 MG/DL — SIGNIFICANT CHANGE UP (ref 70–99)
HCT VFR BLD CALC: 20.7 % — CRITICAL LOW (ref 39–50)
HGB BLD-MCNC: 7.3 G/DL — LOW (ref 13–17)
MCHC RBC-ENTMCNC: 34 PG — SIGNIFICANT CHANGE UP (ref 27–34)
MCHC RBC-ENTMCNC: 35.3 GM/DL — SIGNIFICANT CHANGE UP (ref 32–36)
MCV RBC AUTO: 96.3 FL — SIGNIFICANT CHANGE UP (ref 80–100)
NRBC # BLD: 0 /100 WBCS — SIGNIFICANT CHANGE UP (ref 0–0)
OB PNL STL: POSITIVE
PLATELET # BLD AUTO: 208 K/UL — SIGNIFICANT CHANGE UP (ref 150–400)
POTASSIUM SERPL-MCNC: 4.4 MMOL/L — SIGNIFICANT CHANGE UP (ref 3.5–5.3)
POTASSIUM SERPL-SCNC: 4.4 MMOL/L — SIGNIFICANT CHANGE UP (ref 3.5–5.3)
PROT SERPL-MCNC: 5.7 G/DL — LOW (ref 6–8.3)
RBC # BLD: 2.15 M/UL — LOW (ref 4.2–5.8)
RBC # FLD: 15.4 % — HIGH (ref 10.3–14.5)
SODIUM SERPL-SCNC: 140 MMOL/L — SIGNIFICANT CHANGE UP (ref 135–145)
WBC # BLD: 4.95 K/UL — SIGNIFICANT CHANGE UP (ref 3.8–10.5)
WBC # FLD AUTO: 4.95 K/UL — SIGNIFICANT CHANGE UP (ref 3.8–10.5)

## 2020-09-18 PROCEDURE — 99233 SBSQ HOSP IP/OBS HIGH 50: CPT

## 2020-09-18 PROCEDURE — 99232 SBSQ HOSP IP/OBS MODERATE 35: CPT

## 2020-09-18 RX ORDER — RIVAROXABAN 15 MG-20MG
15 KIT ORAL
Refills: 0 | Status: COMPLETED | OUTPATIENT
Start: 2020-09-18 | End: 2020-09-18

## 2020-09-18 RX ADMIN — PANTOPRAZOLE SODIUM 40 MILLIGRAM(S): 20 TABLET, DELAYED RELEASE ORAL at 05:44

## 2020-09-18 RX ADMIN — Medication 1 MILLIGRAM(S): at 11:51

## 2020-09-18 RX ADMIN — Medication 650 MILLIGRAM(S): at 06:40

## 2020-09-18 RX ADMIN — Medication 12.5 MILLIGRAM(S): at 17:00

## 2020-09-18 RX ADMIN — Medication 325 MILLIGRAM(S): at 11:51

## 2020-09-18 RX ADMIN — OXYCODONE HYDROCHLORIDE 5 MILLIGRAM(S): 5 TABLET ORAL at 21:50

## 2020-09-18 RX ADMIN — Medication 81 MILLIGRAM(S): at 11:51

## 2020-09-18 RX ADMIN — Medication 650 MILLIGRAM(S): at 15:50

## 2020-09-18 RX ADMIN — GABAPENTIN 300 MILLIGRAM(S): 400 CAPSULE ORAL at 05:44

## 2020-09-18 RX ADMIN — GABAPENTIN 300 MILLIGRAM(S): 400 CAPSULE ORAL at 21:20

## 2020-09-18 RX ADMIN — OXYCODONE HYDROCHLORIDE 5 MILLIGRAM(S): 5 TABLET ORAL at 21:20

## 2020-09-18 RX ADMIN — TAMSULOSIN HYDROCHLORIDE 0.4 MILLIGRAM(S): 0.4 CAPSULE ORAL at 21:20

## 2020-09-18 RX ADMIN — Medication 650 MILLIGRAM(S): at 05:44

## 2020-09-18 RX ADMIN — Medication 650 MILLIGRAM(S): at 14:53

## 2020-09-18 RX ADMIN — SENNA PLUS 2 TABLET(S): 8.6 TABLET ORAL at 21:20

## 2020-09-18 RX ADMIN — POLYETHYLENE GLYCOL 3350 17 GRAM(S): 17 POWDER, FOR SOLUTION ORAL at 05:44

## 2020-09-18 RX ADMIN — ATORVASTATIN CALCIUM 40 MILLIGRAM(S): 80 TABLET, FILM COATED ORAL at 21:20

## 2020-09-18 RX ADMIN — Medication 100 MILLIGRAM(S): at 11:51

## 2020-09-18 RX ADMIN — Medication 10 MILLIGRAM(S): at 16:54

## 2020-09-18 RX ADMIN — GABAPENTIN 300 MILLIGRAM(S): 400 CAPSULE ORAL at 14:55

## 2020-09-18 RX ADMIN — RIVAROXABAN 15 MILLIGRAM(S): KIT at 17:01

## 2020-09-18 RX ADMIN — POLYETHYLENE GLYCOL 3350 17 GRAM(S): 17 POWDER, FOR SOLUTION ORAL at 17:01

## 2020-09-18 NOTE — CHART NOTE - NSCHARTNOTESELECT_GEN_ALL_CORE
Malnutrition Notification
Event Note
Nutrition Services
Nutrition Services
Rapid Response

## 2020-09-18 NOTE — PROGRESS NOTE ADULT - SUBJECTIVE AND OBJECTIVE BOX
HPI:  80 yo male PMHx of HTN, CAD s/p stents x 3 (2005), CKD, Afib on Xarelto, s/p Medtronic PPM, myositis, presented to ED 8/30 with left weakness and neck pain. 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 Neurology and neurosurgery consulted. Per neuro evaluation, subcortical CVA suspected, ?right medullary/ pontine but this is uncertain/possibly due to small vessel disease versus cardioembolism related to atrial fibrillation. Unable to confirm with MRI- Pt with PPM that is not compatible with MRI. CTH repeated and stable. TTE EF 65%, no evidence of PFO. Pt with CT spine showing Multilevel cervical spondylosis with severe stenosis at C4-C5. Per Neurosurgery followup with Dr Douglas for aneurysm and C spine stenosis. No surgical intervention at this time. Evaluated by PMR and acute rehab recommended. (03 Sep 2020 15:20)      Subjective    Denies dizziness, cp, palp, SOB.       PAST MEDICAL & SURGICAL HISTORY:  Myositis    Hyperlipidemia    Hypertension    GI bleed    Diverticulitis    No significant past surgical history        MedsMEDICATIONS  (STANDING):  allopurinol 100 milliGRAM(s) Oral daily  aspirin enteric coated 81 milliGRAM(s) Oral daily  atorvastatin 40 milliGRAM(s) Oral at bedtime  bisacodyl Suppository 10 milliGRAM(s) Rectal daily  ferrous    sulfate 325 milliGRAM(s) Oral daily  folic acid 1 milliGRAM(s) Oral daily  gabapentin 300 milliGRAM(s) Oral three times a day  influenza   Vaccine 0.5 milliLiter(s) IntraMuscular once  metoprolol tartrate 12.5 milliGRAM(s) Oral two times a day  pantoprazole    Tablet 40 milliGRAM(s) Oral before breakfast  polyethylene glycol 3350 17 Gram(s) Oral two times a day  rivaroxaban 15 milliGRAM(s) Oral with dinner  senna 2 Tablet(s) Oral at bedtime  tamsulosin 0.4 milliGRAM(s) Oral at bedtime  thiamine 100 milliGRAM(s) Oral daily    MEDICATIONS  (PRN):  acetaminophen   Tablet .. 650 milliGRAM(s) Oral every 6 hours PRN Temp greater or equal to 38C (100.4F), Mild Pain (1 - 3)  guaiFENesin   Syrup  (Sugar-Free) 100 milliGRAM(s) Oral every 6 hours PRN Cough  oxyCODONE    IR 5 milliGRAM(s) Oral every 8 hours PRN Severe Pain (7 - 10)      Vital Signs Last 24 Hrs  T(C): 36.7 (18 Sep 2020 13:04), Max: 36.7 (17 Sep 2020 20:45)  T(F): 98 (18 Sep 2020 13:04), Max: 98.1 (17 Sep 2020 20:45)  HR: 83 (18 Sep 2020 13:04) (70 - 88)  BP: 117/70 (18 Sep 2020 13:04) (107/67 - 121/73)  BP(mean): --  RR: 14 (18 Sep 2020 13:04) (14 - 16)  SpO2: 100% (18 Sep 2020 13:04) (97% - 100%)  I&O's Summary    17 Sep 2020 07:01  -  18 Sep 2020 07:00  --------------------------------------------------------  IN: 0 mL / OUT: 725 mL / NET: -725 mL    18 Sep 2020 07:01  -  18 Sep 2020 14:04  --------------------------------------------------------  IN: 0 mL / OUT: 100 mL / NET: -100 mL        PHYSICAL EXAM:  GENERAL: NAD  NECK: Supple  NERVOUS SYSTEM:  awake and alert  HEART: S1s2 NL , RRR  CHEST/LUNG: Clear to percussion bilaterally  ABDOMEN: Soft, Nontender, Nondistended; Bowel sounds present  EXTREMITIES:  No edema      LABS:(09-18 @ 05:45)                      7.3  4.95 )-----------( 208                 20.7    Neutrophils = -- (--%)  Lymphocytes = -- (--%)  Eosinophils = -- (--%)  Basophils = -- (--%)  Monocytes = -- (--%)  Bands = --%    09-18    140  |  106  |  60<H>  ----------------------------<  98  4.4   |  27  |  1.70<H>    Ca    9.7      18 Sep 2020 05:45    TPro  5.7<L>  /  Alb  2.6<L>  /  TBili  0.2  /  DBili  x   /  AST  12  /  ALT  22  /  AlkPhos  47  09-18      Care Discussed with Consultants/Other Providers [ x] YES  [ ] NO        PT/OT per rehab  ASA/Statin/Xarelto    presyncope  Resolved    Anemia  HD stable  Hgb 7.3  Transfuse 1 unit today. Last transfusion 2 weeks ago.   Iron  Guaiac pos-GI following    Thrombocytopenia  Resolved    PAF  Xarelto/BB    Neck pain with cervical stenosis not new  Pt declined lidoderm  APAP/gabapentin  Medrol dose pack completed 9/16    CKD3  Baseline Cr unknown  Ct stable  Monitor for now    fever 9/8 night  UA neg for UTI(no pyuria). urine c/s staph likely contaminant  CXR neg   Lactic acidosis resolved with IVF  Pt had neg fever w/u on 9/4 also  Elev PCT. D-dimer neg  ECHO and CT abd neg for infection  Completed 5 day course of Abx on 9/13  Monitor for now

## 2020-09-18 NOTE — PROGRESS NOTE ADULT - PROBLEM SELECTOR PLAN 2
Thrombocytopenia-platelet count WNL at present. Follow clinically.
Thrombocytopenia-platelet count WNL at present.  Medication (s) such as pantoprazole/allopurinol  may rarely lead to thrombocytopenia. Ok to continue current medications from hematologic viewpoint at this time.
Thrombocytopenia-platelet count WNL at present. Follow clinically.
Thrombocytopenia-platelet count WNL at present. Follow clinically.
Thrombocytopenia-platelet count fluctuating, acceptable at present.  Medication (s) such as pantoprazole/allopurinol  may rarely lead to thrombocytopenia. Ok to continue current medications from hematologic viewpoint currently.

## 2020-09-18 NOTE — PROGRESS NOTE ADULT - ASSESSMENT
79-year-old gentleman with history of hypertension, coronary artery disease, chronic kidney disease, atrial fibrillation on Xarelto, who presented to the emergency department 8/30/20 with left-sided weakness and neck pain. 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  Patient is now on the acute rehabilitation unit in [facility].  Hematology consulted for anemia/thrombocytopenia. Of note, patient has been under the hematologic care of Dr. Uribe for his anemia. He also reports history of a bone marrow evaluation years ago (no known diagnosis given). He stated he has a history of intermittently guaiac-positive stools (most recently last month), and is under the gastrointestinal care of Dr. Olivo. Plans are for eventual followup endoscopic evaluation.

## 2020-09-18 NOTE — PROGRESS NOTE ADULT - ATTENDING COMMENTS
Transfusion of 1 PRBCs today given dropped H/H, Hematology input appreciated, monitor labs  Case discussed with Medicine  Neurological exam unchanged  Spoke with GI: given anemia and positive stool Guaiacs endoscopy ot be strongly considered after communicating with private GI, AC to be stopped for 48 hours prior to procedure.

## 2020-09-18 NOTE — PROGRESS NOTE ADULT - ASSESSMENT
80 yo male PMHx of HTN, CAD s/p stents x 3 (2005), CKD, Afib on Xarelto, s/p Medtronic PPM, myositis, presented to ED 8/30 with left weakness and neck pain. 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 Neurology and neurosurgery consulted. Per neuro evaluation, subcortical CVA suspected, ?right medullary/ pontine but this is uncertain/possibly due to small vessel disease versus cardio embolism related to atrial fibrillation. Unable to confirm with MRI- Pt with PPM that is not compatible with MRI. CTH repeated and stable. TTE EF 65%, no evidence of PFO. Pt with CT spine showing Multilevel cervical spondylosis with severe stenosis at C4-C5. Per Neurosurgery followup with Dr Douglas for aneurysm and C spine stenosis. No surgical intervention at this time. Evaluated by PMR and acute rehab recommended. Patient with drop in H/H today     #Anemia  1. Anemia known to be chronic   2. H/H 8.2/23.1 (9/16/20)  3. STAT cbc ordered    4. Monitor H/H  5. c/w anticoagulants   6. Pt to follow up with outpatient GI upon D/C    - Patient seen on rounds with Dr Aly Spivey, NP  Department of Gastroenterology   GI cell: [phone]   78 yo male PMHx of HTN, CAD s/p stents x 3 (2005), CKD, Afib on Xarelto, s/p Medtronic PPM, myositis, presented to ED 8/30 with left weakness and neck pain. 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 Neurology and neurosurgery consulted. Per neuro evaluation, subcortical CVA suspected, ?right medullary/ pontine but this is uncertain/possibly due to small vessel disease versus cardio embolism related to atrial fibrillation. Unable to confirm with MRI- Pt with PPM that is not compatible with MRI. CTH repeated and stable. TTE EF 65%, no evidence of PFO. Pt with CT spine showing Multilevel cervical spondylosis with severe stenosis at C4-C5. Per Neurosurgery followup with Dr Douglas for aneurysm and C spine stenosis. No surgical intervention at this time. Evaluated by PMR and acute rehab recommended. Patient with drop in H/H today 7.3/20.7. Patient has no complaints, but does endorse dark stools, patient is currently on PO iron.    #Anemia  - Anemia known to be chronic   - H/H 8.2/23.1 (9/16/20) =>9/18 7.3/20.7  - Monitor H/H, transfuse for Hgb <7  - Hold AC tomorrow in preparation for EGD/colonoscopy Monday 9/21   - Will need COVID swab prior to procedure  - Will need chest xray prior to procedure    - Patient seen on rounds with Dr Aly Spivey, NP  Department of Gastroenterology   GI cell: [phone]

## 2020-09-18 NOTE — PROGRESS NOTE ADULT - ASSESSMENT
EVERARDO FIGUEROA is a 80yo Male with possible right sided subcortical CVA with left sided weakness and with decreased functional mobility, gait instability and ADL impairments.    - COMORBIDITES/ACTIVE MEDICAL ISSUES     Gait Instability, ADL impairments and Functional impairments:  Comprehensive Rehab Program of PT/OT/SLP-tolerating therapy. family training planned. Progress and d/c plan discussed with wife on 9/14-questions answered, grateful for care.    #CVA  - PT/OT/SLP continues. Activities limited by neck pain. Neurontin at 300mg TID. Pain and mobility improved with steroids.  -Xarelto for Hx Afib, Guaiac+ on 9/8, no overt bleeding noted otherwise. Outpt follow up with Dr Olivo.  -ASA for CAD-no chest pain reported. PPM interrogated.   - Lipitor for goal LDL < 70  - GI ppx with Protonix       #Hx Unruptured aneurysm  -outpt follow up Neurosurgery  -no headache, awake and alert    #HTN  -Lopressor BID to 12.5mg as per hospitalist plan. Cardiology evaluation completed for hx recurrent pre-syncope. PPM interrogated.    #Anemia/thrombocytopenia  -Hg 7.3 this am, plan PRBC today as per heme/hospitalist, on Xarelto-GI follow up today. Guaiac pending.   -labs following  -hematology following    #Neck pain/ Cervical spine stenosis   - Neurontin at 300mg TID.   - Tylenol PRN, oxycodone prn with bowel regimen. Warm compress to neck ok. Soft discontinued.  - follow up CT Cspine-completed and shows severe stenosis.  - Neurosurgical follow as outpatient   -Orthopedic Surgery eval completed-medrol pack and modalities. Currently pain and knee weakness improved. Participates in therapy.    #Fever  -Low grade temps with elevated Lactate on 9/9, resolved.  observe Off abx as per ID.      #Urinary frequency  -improved   -Flomax added by  for BPH. PVRs 140-240cc.    GI/Bowel Mgmt - Senna,  Miralax, Dulcolax  /Bladder Mgmt - Voiding independently, PVRx1 and low      Precautions / PROPHYLAXIS:   - Falls, Cardiac, Seizure   - Ortho: Weight bearing status: WBAT   - Lungs: Aspiration, Incentive Spirometer   - Pressure injury/Skin: Turn Q2hrs while in bed, OOB to Chair, PT/OT    - DVT: Xarelto

## 2020-09-18 NOTE — PROGRESS NOTE ADULT - ATTENDING COMMENTS
Patient seen and examined.  Agree with assessment and plan as above.    He did have episodes of dark stool.  No abdominal pain.  There has been a drop in Hgb/Hct and currently being transfused PRBC.    VSS.  Abdomen soft, nontender    Monitor Hgb/Hct and bowel movements  EGD & Colonoscopy Monday  Hold anticoagulation from tomorrow    Discussed with Dr. Llanes.

## 2020-09-18 NOTE — CHART NOTE - NSCHARTNOTEFT_GEN_A_CORE
Called by RN as patient with rectal temp of 100.9 F    patient seen and examined in the bedside. States that he has had mild dry cough for the past week but that it is primarily in his attempts to clear his throat. Denies CP/SOB/subjective fever /dysuria /swelling /nausea /vominting / stomach pain. He has stable HA. Denies dizziness or light headedness at this time    Of not patient had near syncopal even in the AM. CBC revealed anemia with h/h of 7.5. Seen by heme/onc with ? hx of anemia    Vital Signs  T(C): 38.3   T(F): 100.9   HR: 66   BP: 145/57   RR: 14   SpO2: 97%     PHYSICAL EXAMINATION   VItals: T(C): 38.3 (09-04-20 @ 22:20), Max: 38.3 (09-04-20 @ 22:20)  HR: 66 (09-04-20 @ 22:20) (66 - 72)  BP: 145/57 (09-04-20 @ 22:20) (106/72 - 145/57)  RR: 14 (09-04-20 @ 22:20) (14 - 16)  SpO2: 97% (09-04-20 @ 22:20) (97% - 99%)    General: NAD, Resting Comfortable,                                  HEENT: NC/AT, EOM I, PERRLA, Normal Conjunctivae  Cardio: RRR, Normal S1-S2, No M/G/R                              Pulm: No Respiratory Distress,  Lungs CTAB                        Abdomen: ND/NT, Soft, BS+                                                                                  Ext: No C/C/E, Pulses 2+ throughout, No calf tenderness      Cognitive: AAO x 3                                                                                                          Communication:  Fluent,  No dysarthria     78 yo male patient with pmh HTN, CAD s/p stents x 3 (2005), CKD, Afib on Xarelto, s/p Medtronic PPM, myositis admitted for possible right sided subcortical CVA with left sided weakness now with fever  -does not meet sirs criteria - fever alone  - UA from earlier today and CBC negative  - CXR ordered - prelim appears negative  - will monitor for now  - as per heme/onc MDS vs etoh, with anemia and thrombocytopenia, recc transfuse - plan to transfuse , f/u am cbc  monitor while on xarelto  - discussed with hospitalist
NUTRITION FOLLOW UP    SOURCE: Patient [X)   Family [ ]    Medical Record (X)    DIET: DASH/TLC    Met with patient, states he doesn't have much of an appetite, and eating ~50% of meals. No BM documented since 9/3??? Reiterated the importance of adequate PO, fluids, and Ensure BID r/t poor PO and malnutrition. Agreeable to trying a peanut butter and jelly sandwich tonight and verbalized understanding of the importance of consuming his Ensure Enlive BID.     PO INTAKE:  ~50%             CURRENT WEIGHT:   123# (9/10)  123# (9/8)  PERTINENT MEDS:   Pertinent Medications: MEDICATIONS  (STANDING):  allopurinol 100 milliGRAM(s) Oral daily  aspirin enteric coated 81 milliGRAM(s) Oral daily  atorvastatin 40 milliGRAM(s) Oral at bedtime  ferrous    sulfate 325 milliGRAM(s) Oral daily  folic acid 1 milliGRAM(s) Oral daily  gabapentin 300 milliGRAM(s) Oral three times a day  influenza   Vaccine 0.5 milliLiter(s) IntraMuscular once  metoprolol tartrate 12.5 milliGRAM(s) Oral two times a day  pantoprazole    Tablet 40 milliGRAM(s) Oral before breakfast  piperacillin/tazobactam IVPB.. 3.375 Gram(s) IV Intermittent every 8 hours  polyethylene glycol 3350 17 Gram(s) Oral two times a day  rivaroxaban 15 milliGRAM(s) Oral with dinner  senna 2 Tablet(s) Oral at bedtime  tamsulosin 0.4 milliGRAM(s) Oral at bedtime  thiamine 100 milliGRAM(s) Oral daily  vancomycin  IVPB 750 milliGRAM(s) IV Intermittent every 24 hours    MEDICATIONS  (PRN):  acetaminophen   Tablet .. 650 milliGRAM(s) Oral every 6 hours PRN Temp greater or equal to 38C (100.4F), Mild Pain (1 - 3)  bisacodyl Suppository 10 milliGRAM(s) Rectal daily PRN Constipation  guaiFENesin   Syrup  (Sugar-Free) 100 milliGRAM(s) Oral every 6 hours PRN Cough  oxyCODONE    IR 5 milliGRAM(s) Oral every 8 hours PRN Severe Pain (7 - 10)      PERTINENT LABS:  09-10 Na136 mmol/L Glu 93 mg/dL K+ 4.6 mmol/L Cr  2.06 mg/dL<H> BUN 47 mg/dL<H> 09-09 Alb 3.5 g/dL 08-31 Chol 152 mg/dL LDL 85 mg/dL HDL 48 mg/dL Trig 156 mg/dL<H>      SKIN:  intact  EDEMA: none per RN documentation  LAST BM: 9/3 (??) Bowel regimen per medical team.     ESTIMATED NEEDS:   [X] no change since previous assessment  [ ] recalculated:     PREVIOUS NUTRITION DIAGNOSIS:  malnutrition    NUTRITION DIAGNOSIS is :  (x)  Ongoing      (    ) Resolved,  RD will follow as per nutrition department protocol.    NUTRITION RECOMMENDATIONS:   1. Continue diet as ordered.   2. Continue Ensure Enlive BID.   3. Encourage fluids to encourage GI motility. Bowel regimen per medical team.    MONITORING AND EVALUATION:   1. Tolerance to diet prescription   2. PO intake  3. Weights  4. Labs  5. Follow Up per protocol     RD to remain available     Edda Freed RDN #[id number]
Nutrition Follow Up Note  Hospital Course   (Per Electronic Medical Record)    Source:  Patient [X]  Medical Record [X]      Diet:   DASH-TLC Diet w/ Thin Liquids  Tolerates Diet Well  No Chewing/Swallowing Difficulties  No Recent Nausea, Vomiting, Diarrhea & Some Recent Constipation  Consumes % of Meals (as Per Documentation)  States Good PO Intake/Appetite   on Ensure Enlive 8oz PO BID (Provides 700kcal & 40grams of Protein)  Patient Takes Nutrition Supplement   Education Provided on Need for Supplementation     Enteral/Parenteral Nutrition: Not Applicable    Current Weight: 124.6lb on 9/17  Obtain Weights Daily   Weights Currently Stable @This Time  Weight Most Likely an Error on 9/12    Pertinent Medications: MEDICATIONS  (STANDING):  allopurinol 100 milliGRAM(s) Oral daily  aspirin enteric coated 81 milliGRAM(s) Oral daily  atorvastatin 40 milliGRAM(s) Oral at bedtime  bisacodyl Suppository 10 milliGRAM(s) Rectal daily  ferrous    sulfate 325 milliGRAM(s) Oral daily  folic acid 1 milliGRAM(s) Oral daily  gabapentin 300 milliGRAM(s) Oral three times a day  influenza   Vaccine 0.5 milliLiter(s) IntraMuscular once  metoprolol tartrate 12.5 milliGRAM(s) Oral two times a day  pantoprazole    Tablet 40 milliGRAM(s) Oral before breakfast  polyethylene glycol 3350 17 Gram(s) Oral two times a day  rivaroxaban 15 milliGRAM(s) Oral with dinner  senna 2 Tablet(s) Oral at bedtime  tamsulosin 0.4 milliGRAM(s) Oral at bedtime  thiamine 100 milliGRAM(s) Oral daily    MEDICATIONS  (PRN):  acetaminophen   Tablet .. 650 milliGRAM(s) Oral every 6 hours PRN Temp greater or equal to 38C (100.4F), Mild Pain (1 - 3)  guaiFENesin   Syrup  (Sugar-Free) 100 milliGRAM(s) Oral every 6 hours PRN Cough  oxyCODONE    IR 5 milliGRAM(s) Oral every 8 hours PRN Severe Pain (7 - 10)      Pertinent Labs:  09-18 Na140 mmol/L Glu 98 mg/dL K+ 4.4 mmol/L Cr  1.70 mg/dL<H> BUN 60 mg/dL<H> 09-18 Alb 2.6 g/dL<L> 08-31 Chol 152 mg/dL LDL 85 mg/dL HDL 48 mg/dL Trig 156 mg/dL<H>    Skin: No Pressure Ulcers     Edema: None Noted     Last Bowel Movement: on 9/16    Estimated Needs:   [X] No Change Since Previous Assessment    Previous Nutrition Diagnosis:   Severe Malnutrition       Nutrition Diagnosis is [X] Ongoing - Patient Continues on Nutrition Supplement, Patient Takes Nutrition Supplement & Nutrition Education Provided on Need for Supplementation     New Nutrition Diagnosis: [X] Not Applicable    Interventions:   1. Education Provided on Need for Supplementation    2. Recommend Continue Nutrition Plan of Care     Monitoring & Evaluation:   [X] Weights   [X] PO Intake   [X] Skin Integrity   [X] Follow Up (Per Protocol)  [X] Tolerance to Diet Prescription   [X] Other: Labs     Registered Dietitian/Nutritionist Remains Available.  Ildefonso Mendoza RDN    Pager # [phone]  Phone# [phone]
REHABILITATION DIAGNOSIS/IMPAIRMENT GROUP CODE:  1.2 s/p right sided cva with left sided weakness    COMORBIDITIES/COMPLICATING CONDITIONS IMPACTING REHABILITATION:  HEALTH ISSUES - PROBLEM Dx: HTN, CAD s/p stents x 3 (2005), CKD, Afib on Xarelto, s/p Medtronic PPM, myositis        PAST MEDICAL & SURGICAL HISTORY:  Myositis  Hyperlipidemia  Hypertension  GI bleed  Diverticulitis  No significant past surgical history      Based upon consideration of the patient's impairments, functional status, complicating conditions and any other contributing factors and after information garnered from the assessments of all therapy disciplines involved in treating the patient and other pertinent clinicians:    INTERDISCIPLINARY REHABILITATION INTERVENTIONS:    [ x  ] Transfer Training  [  x ] Bed Mobility  [x   ] Therapeutic Exercise  [ x  ] Balance/Coordination Exercises  [ x  ] Locomotion retraining  [ x  ] Stairs  [ x  ] Functional Transfer Training  [ x  ] Bowel/Bladder program  [ x  ] Pain Management  [ x  ] Skin/Wound Care  [ x  ] Visual/Perceptual Training  [ x  ] Therapeutic Recreation Activities  [  x ] Neuromuscular Re-education  [ x  ] Activities of Daily Living  [x   ] Speech Exercise  [ x ] Swallowing Exercises  [  x ] Vital Stim  [  x ] Dietary Supplements  [ x  ] Calorie Count  [ x  ] Cognitive Exercises  [ x  ] Congnitive/Linguistic Treatment  [ x  ] Behavior Program  [ x  ] Neuropsych Therapy  [ x ] Patient/Family Counseling  [ x  ] Family Training  [ x  ] Community Re-entry  [ x  ] Orthotic Evaluation  [   ] Prosthetic Eval/Training    MEDICAL PROGNOSIS:  good    REHAB POTENTIAL:  excellent    EXPECTED DAILY THERAPY:         PT: 1h       OT: 1h       ST: 1h       S&O: eval    EXPECTED INTENSITY OF PROGRAM:  3h daily, 5d week    EXPECTED FREQUENCY OF PROGRAM:  daily    ESTIMATED LOS:  14d    ESTIMATED DISPOSITION:  home    INTERDISCIPLINARY FUNCTIONAL OUTCOMES?GOALS:         Gait/Mobility:       Transfers:       ADLs:       Functional Transfers:       Medication Management:       Communication:       Cognitive:       Dysphagia:       Bladder       Bowel:
Rapid response was called at approximately 13:50. Patient was in PT session when he had presyncopal episode. Therapist was present, so patient did not fall and was gently brought to the bed. He felt a bit better after lying down. His Systolic BP was found to be 101, HR 81. Rapid response team, hospitalist and myself was present at bedside. Patient had same sequelae on day of admission. 500cc NS bolus ordered, with another 500cc to follow. CBC, Chemistry and trops ordered. EKG ordered. Repeat BP was 126/68 and patient reports feeling better. Dr. Llanes notified at 13:58.     Will continue to monitor
rapid response in [employer] gym    pt was having therapy, ambulating with therapist, then suddenly felt weak, sat down in a chair and his eyes started to roll back. Therapist lay pt down immediately. Pt denies LOC, just felt very weak. BP was 85/50s, HR 75 at that time. There were no fall/ head injury. Pt denies fever, chills, sob, cp, palpitations, pain anywhere, new focal weakness. no headache, dizziness, blurry vision.  '    Gen: appears weak  Mucosal membrane: Lips dry  Lung: CTA b/l  Card: s1 s2 RRR no murmur  Abd: soft, NT, ND, + BS  Ext: no edema  neuro: mild left hemiparesis       A/P"  Near syncope likely orthostatic hypotension  - EKG NSR, no acute ST/T changes  - AM lab reviewed  - Pt appears very dry, 1L NS bolus ordered.   - BP improved to 110s with pt laying in bed and IVF
Upon Nutritional Assessment by the Registered Dietitian your patient was determined to meet criteria / has evidence of the following diagnosis/diagnoses:          [ ]  Mild Protein Calorie Malnutrition        [ ]  Moderate Protein Calorie Malnutrition        [x] Severe Protein Calorie Malnutrition        [ ] Unspecified Protein Calorie Malnutrition        [x] Underweight / BMI <19        [ ] Morbid Obesity / BMI > 40      Findings as based on:  [x] Comprehensive nutrition assessment   [ ] Nutrition Focused Physical Exam  [x] Other: observed severe depletion of muscle mass to temporal and orbital regions, severe depletion of body fat to orbital region and BMI of 16.  Reports UBW~ 150# and current weight 123#. Suspect eating less than 75% of estimated needs > 1 month and hx of ETOH/substance abuse.      Nutrition Plan/Recommendations:      1. Continue diet as ordered.   2. Enlive BID with meals recommended.   3. Educated on high calorie options at meals.   4. Recommend daily MVI with minerals. Continue thiamine and folic acid per medical team r/t ETOH and substance history.     PROVIDER Section:     By signing this assessment you are acknowledging and agree with the diagnosis/diagnoses assigned by the Registered Dietitian    Comments:

## 2020-09-18 NOTE — PROGRESS NOTE ADULT - SUBJECTIVE AND OBJECTIVE BOX
INTERVAL HPI/OVERNIGHT EVENTS: No overnight events. Patient seen and examined at bedside. Patient with drop in Hgb today, will receive 2 units of PRBC today as per primary team. Patient denies N/V/D, hematemesis Does endorse dark stool, but is currently on PO iron.    HPI:  80 yo male PMHx of HTN, CAD s/p stents x 3 (), CKD, Afib on Xarelto, s/p Medtronic PPM, myositis, presented to ED  with left weakness and neck pain. 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 Neurology and neurosurgery consulted. Per neuro evaluation, subcortical CVA suspected, ?right medullary/ pontine but this is uncertain/possibly due to small vessel disease versus cardioembolism related to atrial fibrillation. Unable to confirm with MRI- Pt with PPM that is not compatible with MRI. CTH repeated and stable. TTE EF 65%, no evidence of PFO. Pt with CT spine showing Multilevel cervical spondylosis with severe stenosis at C4-C5. Per Neurosurgery followup with Dr Douglas for aneurysm and C spine stenosis. No surgical intervention at this time. Evaluated by PMR and acute rehab recommended. (03 Sep 2020 15:20)    MEDICATIONS  (STANDING):  allopurinol 100 milliGRAM(s) Oral daily  aspirin enteric coated 81 milliGRAM(s) Oral daily  atorvastatin 40 milliGRAM(s) Oral at bedtime  bisacodyl Suppository 10 milliGRAM(s) Rectal daily  ferrous    sulfate 325 milliGRAM(s) Oral daily  folic acid 1 milliGRAM(s) Oral daily  gabapentin 300 milliGRAM(s) Oral three times a day  influenza   Vaccine 0.5 milliLiter(s) IntraMuscular once  metoprolol tartrate 12.5 milliGRAM(s) Oral two times a day  pantoprazole    Tablet 40 milliGRAM(s) Oral before breakfast  polyethylene glycol 3350 17 Gram(s) Oral two times a day  rivaroxaban 15 milliGRAM(s) Oral with dinner  senna 2 Tablet(s) Oral at bedtime  tamsulosin 0.4 milliGRAM(s) Oral at bedtime  thiamine 100 milliGRAM(s) Oral daily    MEDICATIONS  (PRN):  acetaminophen   Tablet .. 650 milliGRAM(s) Oral every 6 hours PRN Temp greater or equal to 38C (100.4F), Mild Pain (1 - 3)  guaiFENesin   Syrup  (Sugar-Free) 100 milliGRAM(s) Oral every 6 hours PRN Cough  oxyCODONE    IR 5 milliGRAM(s) Oral every 8 hours PRN Severe Pain (7 - 10)      Allergies    No Known Allergies    Intolerances      PHYSICAL EXAM:   Vital Signs:  Vital Signs Last 24 Hrs  T(C): 36.7 (18 Sep 2020 13:04), Max: 36.7 (17 Sep 2020 20:45)  T(F): 98 (18 Sep 2020 13:04), Max: 98.1 (17 Sep 2020 20:45)  HR: 83 (18 Sep 2020 13:04) (70 - 88)  BP: 117/70 (18 Sep 2020 13:04) (107/67 - 121/73)  BP(mean): --  RR: 14 (18 Sep 2020 13:04) (14 - 16)  SpO2: 100% (18 Sep 2020 13:04) (97% - 100%)  Daily     Daily Weight in k.8 (17 Sep 2020 23:26)I&O's Summary    17 Sep 2020 07:01  -  18 Sep 2020 07:00  --------------------------------------------------------  IN: 0 mL / OUT: 725 mL / NET: -725 mL    18 Sep 2020 07:01  -  18 Sep 2020 15:35  --------------------------------------------------------  IN: 0 mL / OUT: 100 mL / NET: -100 mL        GENERAL:  NAD  HEENT:  NC/AT,  conjunctivae clear and pink, no thyromegaly, nodules, adenopathy, no JVD, sclera -anicteric  CHEST:  Full & symmetric excursion, no increased effort, breath sounds clear  HEART:  Regular rhythm, S1, S2, no murmur/rub/S3/S4, no abdominal bruit, no edema  ABDOMEN:  Soft, non-tender, non-distended, normoactive bowel sounds,  no masses ,no hepato-splenomegaly, no signs of chronic liver disease  EXTREMITIES:  no cyanosis, clubbing or edema  SKIN:  No rash/erythema/ecchymoses/petechiae/wounds/abscess/warm/dry  NEURO:  Alert, oriented, no asterixis, no tremor, no encephalopathy      LABS:                        7.3    4.95  )-----------( 208      ( 18 Sep 2020 05:45 )             20.7     09-18    140  |  106  |  60<H>  ----------------------------<  98  4.4   |  27  |  1.70<H>    Ca    9.7      18 Sep 2020 05:45    TPro  5.7<L>  /  Alb  2.6<L>  /  TBili  0.2  /  DBili  x   /  AST  12  /  ALT  22  /  AlkPhos  47  -        amylase   lipase  RADIOLOGY & ADDITIONAL TESTS:    abd< from: CT Abdomen and Pelvis No Cont (20 @ 16:34) >    EXAM:  CT ABDOMEN AND PELVIS      PROCEDURE DATE:  2020        INTERPRETATION:  CLINICAL INFORMATION: Fever. Anemia and thrombocytopenia. Undergoing rehabilitation for acute CVA.    COMPARISON: None.    PROCEDURE:  CT of the Abdomen and Pelvis was performed without intravenous contrast.  Intravenous contrast: None.  Oral contrast: None.  Sagittal and coronal reformats were performed.    FINDINGS:    LOWER CHEST: Cardiac pacemaker lead.    The evaluation of the solid organs recommend limited without intravenous contrast.    LIVER: Cysts  Small indeterminate hypodense lesion centrally at the dome of the liver. Lateral segment left hepatic lobe.  BILE DUCTS: Normal caliber.  GALLBLADDER: Not visualized, correlate with surgical history.  SPLEEN: Within normal limits.  PANCREAS: Within normal limits.  ADRENALS: Within normal limits.  KIDNEYS/URETERS: No hydronephrosis or obstructing ureteral calculus noted.    BLADDER: Moderately distended.  Small diverticulum at the right dome.  Thin calcifications along the right posterior wall.  REPRODUCTIVE ORGANS: Enlarged prostate.    BOWEL: Distended fluid-filled stomach.  Moderate fecal retention throughout the colon, without obstruction.  Colonic diverticulosis, without CT evidence of diverticulitis.   Appendix is mildly prominent, measuring between 6 and 7 mm, extending into the right lower pelvis.  No periappendiceal inflammatory change.  PERITONEUM: No ascites.  No localized intra-abdominal fluid collection or pneumoperitoneum.    VESSELS: Atherosclerotic calcification of the abdominal aorta with mild infrarenal ectasia, measuring up to 2.7 cm.  RETROPERITONEUM/LYMPH NODES: No enlarged retroperitoneal lymphadenopathy.  ABDOMINAL WALL: Small amount of fluid scrotal sac.    BONES:Degenerative changes of the spine.    IMPRESSION:    Fluid distended stomach, correlate clinically for gastritis or peptic ulcer disease.    Moderate fecal retention without bowel obstruction.    Other findings as discussed.      SEBASTIEN REDMOND M.D., ATTENDING RADIOLOGIST  This document has been electronically signed. Sep 11 2020  4:58PM

## 2020-09-18 NOTE — PROGRESS NOTE ADULT - SUBJECTIVE AND OBJECTIVE BOX
EVERARDO FIGUEROA   80y   Male    Admitting: MANUEL Llanes  HPI:  79-year-old gentleman with history of hypertension, coronary artery disease, chronic kidney disease, atrial fibrillation on Xarelto, who presented to the emergency department 8/30/20 with left-sided weakness and neck pain. 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  Patient is now on the acute rehabilitation unit in [facility].  Hematology consulted for anemia/thrombocytopenia. Of note, patient has been under the hematologic care of Dr. Uribe for his anemia. He also reports history of a bone marrow evaluation years ago (no known diagnosis given). He stated he has a history of intermittently guaiac-positive stools (most recently last month), and is under the gastrointestinal care of Dr. Olivo. Plans were for eventual followup endoscopic evaluation.    PAST MEDICAL & SURGICAL HISTORY:  Myositis    Hyperlipidemia    Hypertension    GI bleed    Diverticulitis    HEALTH ISSUES - PROBLEM Dx:  BPH with urinary obstruction    Thrombocytopenia    Anemia, unspecified type    MEDICATIONS  (STANDING):  allopurinol 100 milliGRAM(s) Oral daily  aspirin enteric coated 81 milliGRAM(s) Oral daily  atorvastatin 40 milliGRAM(s) Oral at bedtime  bisacodyl Suppository 10 milliGRAM(s) Rectal daily  ferrous    sulfate 325 milliGRAM(s) Oral daily  folic acid 1 milliGRAM(s) Oral daily  gabapentin 300 milliGRAM(s) Oral three times a day  influenza   Vaccine 0.5 milliLiter(s) IntraMuscular once  metoprolol tartrate 12.5 milliGRAM(s) Oral two times a day  pantoprazole    Tablet 40 milliGRAM(s) Oral before breakfast  polyethylene glycol 3350 17 Gram(s) Oral two times a day  rivaroxaban 15 milliGRAM(s) Oral with dinner  senna 2 Tablet(s) Oral at bedtime  tamsulosin 0.4 milliGRAM(s) Oral at bedtime  thiamine 100 milliGRAM(s) Oral daily    MEDICATIONS  (PRN):  acetaminophen   Tablet .. 650 milliGRAM(s) Oral every 6 hours PRN Temp greater or equal to 38C (100.4F), Mild Pain (1 - 3)  guaiFENesin   Syrup  (Sugar-Free) 100 milliGRAM(s) Oral every 6 hours PRN Cough  oxyCODONE    IR 5 milliGRAM(s) Oral every 8 hours PRN Severe Pain (7 - 10)    Allergies    No Known Allergies    INTERVAL HPI/OVERNIGHT EVENTS:  Patient S&E at bedside. Stated feels well. No c/o CP, SOB, palpitations or light headedness.    VITAL SIGNS:  T(F): 97.4 (09-18-20 @ 08:18)  HR: 88 (09-18-20 @ 08:18)  BP: 118/70 (09-18-20 @ 08:18)  RR: 15 (09-18-20 @ 08:18)  SpO2: 99% (09-18-20 @ 08:18)    PHYSICAL EXAM:  Constitutional: NAD  Eyes: sclera non-icteric  Neck: no JVD  Respiratory: CTA b/l, good air entry b/l ant.  Cardiovascular: RRR, no M/R/G  Gastrointestinal: soft, NTND, no masses palpable, no hepatosplenomegaly  Extremities: no calf tenderness  Neurological: Awake, alert.    Labs:             7.3    4.95  )-----------( 208      ( 09-18 @ 05:45 )             20.7                7.7    5.78  )-----------( 226      ( 09-17 @ 15:26 )             21.9                8.2    5.19  )-----------( 222      ( 09-16 @ 05:30 )             23.1       09-18    140  |  106  |  60<H>  ----------------------------<  98  4.4   |  27  |  1.70<H>    Ca    9.7      18 Sep 2020 05:45    TPro  5.7<L>  /  Alb  2.6<L>  /  TBili  0.2  /  DBili  x   /  AST  12  /  ALT  22  /  AlkPhos  47  09-18

## 2020-09-19 DIAGNOSIS — K92.2 GASTROINTESTINAL HEMORRHAGE, UNSPECIFIED: ICD-10-CM

## 2020-09-19 LAB
BASOPHILS # BLD AUTO: 0.04 K/UL — SIGNIFICANT CHANGE UP (ref 0–0.2)
BASOPHILS NFR BLD AUTO: 0.6 % — SIGNIFICANT CHANGE UP (ref 0–2)
EOSINOPHIL # BLD AUTO: 0.12 K/UL — SIGNIFICANT CHANGE UP (ref 0–0.5)
EOSINOPHIL NFR BLD AUTO: 1.9 % — SIGNIFICANT CHANGE UP (ref 0–6)
HCT VFR BLD CALC: 27.6 % — LOW (ref 39–50)
HGB BLD-MCNC: 9.8 G/DL — LOW (ref 13–17)
IMM GRANULOCYTES NFR BLD AUTO: 0.2 % — SIGNIFICANT CHANGE UP (ref 0–1.5)
LYMPHOCYTES # BLD AUTO: 1.01 K/UL — SIGNIFICANT CHANGE UP (ref 1–3.3)
LYMPHOCYTES # BLD AUTO: 16.3 % — SIGNIFICANT CHANGE UP (ref 13–44)
MCHC RBC-ENTMCNC: 33.8 PG — SIGNIFICANT CHANGE UP (ref 27–34)
MCHC RBC-ENTMCNC: 35.5 GM/DL — SIGNIFICANT CHANGE UP (ref 32–36)
MCV RBC AUTO: 95.2 FL — SIGNIFICANT CHANGE UP (ref 80–100)
MONOCYTES # BLD AUTO: 0.39 K/UL — SIGNIFICANT CHANGE UP (ref 0–0.9)
MONOCYTES NFR BLD AUTO: 6.3 % — SIGNIFICANT CHANGE UP (ref 2–14)
NEUTROPHILS # BLD AUTO: 4.63 K/UL — SIGNIFICANT CHANGE UP (ref 1.8–7.4)
NEUTROPHILS NFR BLD AUTO: 74.7 % — SIGNIFICANT CHANGE UP (ref 43–77)
NRBC # BLD: 0 /100 WBCS — SIGNIFICANT CHANGE UP (ref 0–0)
PLATELET # BLD AUTO: 201 K/UL — SIGNIFICANT CHANGE UP (ref 150–400)
RBC # BLD: 2.9 M/UL — LOW (ref 4.2–5.8)
RBC # FLD: 15.5 % — HIGH (ref 10.3–14.5)
SARS-COV-2 RNA SPEC QL NAA+PROBE: SIGNIFICANT CHANGE UP
WBC # BLD: 6.2 K/UL — SIGNIFICANT CHANGE UP (ref 3.8–10.5)
WBC # FLD AUTO: 6.2 K/UL — SIGNIFICANT CHANGE UP (ref 3.8–10.5)

## 2020-09-19 PROCEDURE — 71045 X-RAY EXAM CHEST 1 VIEW: CPT | Mod: 26

## 2020-09-19 PROCEDURE — 99232 SBSQ HOSP IP/OBS MODERATE 35: CPT

## 2020-09-19 RX ORDER — MAGNESIUM HYDROXIDE 400 MG/1
30 TABLET, CHEWABLE ORAL ONCE
Refills: 0 | Status: DISCONTINUED | OUTPATIENT
Start: 2020-09-19 | End: 2020-09-24

## 2020-09-19 RX ADMIN — SENNA PLUS 2 TABLET(S): 8.6 TABLET ORAL at 22:17

## 2020-09-19 RX ADMIN — Medication 650 MILLIGRAM(S): at 22:17

## 2020-09-19 RX ADMIN — Medication 81 MILLIGRAM(S): at 11:52

## 2020-09-19 RX ADMIN — POLYETHYLENE GLYCOL 3350 17 GRAM(S): 17 POWDER, FOR SOLUTION ORAL at 17:22

## 2020-09-19 RX ADMIN — OXYCODONE HYDROCHLORIDE 5 MILLIGRAM(S): 5 TABLET ORAL at 19:58

## 2020-09-19 RX ADMIN — Medication 100 MILLIGRAM(S): at 11:53

## 2020-09-19 RX ADMIN — Medication 12.5 MILLIGRAM(S): at 06:06

## 2020-09-19 RX ADMIN — OXYCODONE HYDROCHLORIDE 5 MILLIGRAM(S): 5 TABLET ORAL at 06:06

## 2020-09-19 RX ADMIN — ATORVASTATIN CALCIUM 40 MILLIGRAM(S): 80 TABLET, FILM COATED ORAL at 22:17

## 2020-09-19 RX ADMIN — OXYCODONE HYDROCHLORIDE 5 MILLIGRAM(S): 5 TABLET ORAL at 06:35

## 2020-09-19 RX ADMIN — TAMSULOSIN HYDROCHLORIDE 0.4 MILLIGRAM(S): 0.4 CAPSULE ORAL at 22:17

## 2020-09-19 RX ADMIN — OXYCODONE HYDROCHLORIDE 5 MILLIGRAM(S): 5 TABLET ORAL at 20:55

## 2020-09-19 RX ADMIN — Medication 325 MILLIGRAM(S): at 11:52

## 2020-09-19 RX ADMIN — PANTOPRAZOLE SODIUM 40 MILLIGRAM(S): 20 TABLET, DELAYED RELEASE ORAL at 06:06

## 2020-09-19 RX ADMIN — Medication 1 MILLIGRAM(S): at 11:52

## 2020-09-19 RX ADMIN — GABAPENTIN 300 MILLIGRAM(S): 400 CAPSULE ORAL at 13:41

## 2020-09-19 RX ADMIN — POLYETHYLENE GLYCOL 3350 17 GRAM(S): 17 POWDER, FOR SOLUTION ORAL at 07:35

## 2020-09-19 RX ADMIN — GABAPENTIN 300 MILLIGRAM(S): 400 CAPSULE ORAL at 22:17

## 2020-09-19 RX ADMIN — Medication 650 MILLIGRAM(S): at 23:12

## 2020-09-19 RX ADMIN — Medication 100 MILLIGRAM(S): at 11:52

## 2020-09-19 RX ADMIN — GABAPENTIN 300 MILLIGRAM(S): 400 CAPSULE ORAL at 06:06

## 2020-09-19 RX ADMIN — Medication 12.5 MILLIGRAM(S): at 17:22

## 2020-09-19 NOTE — PROGRESS NOTE ADULT - ASSESSMENT
Elderly male in acute rehab with guaiac positive anemia and recent drop.  Hgb/Hct appears to be stable at present time.

## 2020-09-19 NOTE — PROGRESS NOTE ADULT - SUBJECTIVE AND OBJECTIVE BOX
HPI:  78 yo male PMHx of HTN, CAD s/p stents x 3 (2005), CKD, Afib on Xarelto, s/p Medtronic PPM, myositis, presented to ED 8/30 with left weakness and neck pain. 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 Neurology and neurosurgery consulted. Per neuro evaluation, subcortical CVA suspected, ?right medullary/ pontine but this is uncertain/possibly due to small vessel disease versus cardioembolism related to atrial fibrillation. Unable to confirm with MRI- Pt with PPM that is not compatible with MRI. CTH repeated and stable. TTE EF 65%, no evidence of PFO. Pt with CT spine showing Multilevel cervical spondylosis with severe stenosis at C4-C5. Per Neurosurgery followup with Dr Douglas for aneurysm and C spine stenosis. No surgical intervention at this time. Evaluated by PMR and acute rehab recommended. (03 Sep 2020 15:20)      Subjective    neck pain/lbp pain better.     PAST MEDICAL & SURGICAL HISTORY:  Myositis    Hyperlipidemia    Hypertension    GI bleed    Diverticulitis    No significant past surgical history        MedsMEDICATIONS  (STANDING):  allopurinol 100 milliGRAM(s) Oral daily  aspirin enteric coated 81 milliGRAM(s) Oral daily  atorvastatin 40 milliGRAM(s) Oral at bedtime  bisacodyl Suppository 10 milliGRAM(s) Rectal daily  ferrous    sulfate 325 milliGRAM(s) Oral daily  folic acid 1 milliGRAM(s) Oral daily  gabapentin 300 milliGRAM(s) Oral three times a day  influenza   Vaccine 0.5 milliLiter(s) IntraMuscular once  metoprolol tartrate 12.5 milliGRAM(s) Oral two times a day  pantoprazole    Tablet 40 milliGRAM(s) Oral before breakfast  polyethylene glycol 3350 17 Gram(s) Oral two times a day  senna 2 Tablet(s) Oral at bedtime  tamsulosin 0.4 milliGRAM(s) Oral at bedtime  thiamine 100 milliGRAM(s) Oral daily    MEDICATIONS  (PRN):  acetaminophen   Tablet .. 650 milliGRAM(s) Oral every 6 hours PRN Temp greater or equal to 38C (100.4F), Mild Pain (1 - 3)  guaiFENesin   Syrup  (Sugar-Free) 100 milliGRAM(s) Oral every 6 hours PRN Cough  magnesium hydroxide Suspension 30 milliLiter(s) Oral once PRN Constipation  oxyCODONE    IR 5 milliGRAM(s) Oral every 8 hours PRN Severe Pain (7 - 10)      Vital Signs Last 24 Hrs  T(C): 36.4 (19 Sep 2020 08:02), Max: 36.9 (19 Sep 2020 06:04)  T(F): 97.6 (19 Sep 2020 08:02), Max: 98.4 (19 Sep 2020 06:04)  HR: 67 (19 Sep 2020 08:02) (67 - 73)  BP: 107/73 (19 Sep 2020 08:02) (107/73 - 132/68)  BP(mean): --  RR: 15 (19 Sep 2020 08:02) (14 - 16)  SpO2: 98% (19 Sep 2020 08:02) (98% - 99%)  I&O's Summary    18 Sep 2020 07:01  -  19 Sep 2020 07:00  --------------------------------------------------------  IN: 0 mL / OUT: 300 mL / NET: -300 mL        PHYSICAL EXAM:  GENERAL: NAD  NECK: Supple  NERVOUS SYSTEM:  awake and alert  HEART: S1s2 NL , RRR  CHEST/LUNG: Clear to percussion bilaterally  ABDOMEN: Soft, Nontender, Nondistended; Bowel sounds present  EXTREMITIES:  No edema      LABS:(09-19 @ 09:40)                      9.8  6.20 )-----------( 201                 27.6    Neutrophils = 4.63 (74.7%)  Lymphocytes = 1.01 (16.3%)  Eosinophils = 0.12 (1.9%)  Basophils = 0.04 (0.6%)  Monocytes = 0.39 (6.3%)  Bands = --%    09-18    140  |  106  |  60<H>  ----------------------------<  98  4.4   |  27  |  1.70<H>    Ca    9.7      18 Sep 2020 05:45    TPro  5.7<L>  /  Alb  2.6<L>  /  TBili  0.2  /  DBili  x   /  AST  12  /  ALT  22  /  AlkPhos  47  09-18        Care Discussed with Consultants/Other Providers [ x] YES  [ ] NO        PT/OT per rehab  ASA/Statin    presyncope  Resolved    Anemia  HD stable  Hgb 7.3 9/18 s/p transfusion 9/4 and 9/18   Iron  Guaiac pos-GI to do scope on Monday  Hold Xarelto  Per pt, pt had extensive anemia w/u as outpt including endo(egd/colon/capsule and bm bx)    Thrombocytopenia  Resolved    PAF  BB    Neck pain with cervical stenosis not new  Pt declined lidoderm  APAP/gabapentin  Medrol dose pack completed 9/16    CKD3  Baseline Cr unknown  Ct stable  Monitor for now    fever 9/8 night  UA neg for UTI(no pyuria). urine c/s staph likely contaminant  CXR neg   Lactic acidosis resolved with IVF  Pt had neg fever w/u on 9/4 also  Elev PCT. D-dimer neg  ECHO and CT abd neg for infection  Completed 5 day course of Abx on 9/13  Monitor for now

## 2020-09-19 NOTE — PROGRESS NOTE ADULT - SUBJECTIVE AND OBJECTIVE BOX
Patient is a 80y old  Male who presents with a chief complaint of 1.2 s/p right sided cva with left sided weakness (18 Sep 2020 15:35)      HPI:  80 yo male PMHx of HTN, CAD s/p stents x 3 (), CKD, Afib on Xarelto, s/p Medtronic PPM, myositis, presented to ED  with left weakness and neck pain. 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 Neurology and neurosurgery consulted. Per neuro evaluation, subcortical CVA suspected, ?right medullary/ pontine but this is uncertain/possibly due to small vessel disease versus cardioembolism related to atrial fibrillation. Unable to confirm with MRI- Pt with PPM that is not compatible with MRI. CTH repeated and stable. TTE EF 65%, no evidence of PFO. Pt with CT spine showing Multilevel cervical spondylosis with severe stenosis at C4-C5. Per Neurosurgery followup with Dr Douglas for aneurysm and C spine stenosis. No surgical intervention at this time. Evaluated by PMR and acute rehab recommended. (03 Sep 2020 15:20)      PAST MEDICAL & SURGICAL HISTORY:  Myositis    Hyperlipidemia    Hypertension    GI bleed    Diverticulitis    No significant past surgical history        MEDICATIONS  (STANDING):  allopurinol 100 milliGRAM(s) Oral daily  aspirin enteric coated 81 milliGRAM(s) Oral daily  atorvastatin 40 milliGRAM(s) Oral at bedtime  bisacodyl Suppository 10 milliGRAM(s) Rectal daily  ferrous    sulfate 325 milliGRAM(s) Oral daily  folic acid 1 milliGRAM(s) Oral daily  gabapentin 300 milliGRAM(s) Oral three times a day  influenza   Vaccine 0.5 milliLiter(s) IntraMuscular once  metoprolol tartrate 12.5 milliGRAM(s) Oral two times a day  pantoprazole    Tablet 40 milliGRAM(s) Oral before breakfast  polyethylene glycol 3350 17 Gram(s) Oral two times a day  senna 2 Tablet(s) Oral at bedtime  tamsulosin 0.4 milliGRAM(s) Oral at bedtime  thiamine 100 milliGRAM(s) Oral daily    MEDICATIONS  (PRN):  acetaminophen   Tablet .. 650 milliGRAM(s) Oral every 6 hours PRN Temp greater or equal to 38C (100.4F), Mild Pain (1 - 3)  guaiFENesin   Syrup  (Sugar-Free) 100 milliGRAM(s) Oral every 6 hours PRN Cough  oxyCODONE    IR 5 milliGRAM(s) Oral every 8 hours PRN Severe Pain (7 - 10)      Allergies    No Known Allergies    Intolerances          VITALS  80y  Vital Signs Last 24 Hrs  T(C): 36.4 (19 Sep 2020 08:02), Max: 36.9 (19 Sep 2020 06:04)  T(F): 97.6 (19 Sep 2020 08:02), Max: 98.4 (19 Sep 2020 06:04)  HR: 67 (19 Sep 2020 08:02) (67 - 83)  BP: 107/73 (19 Sep 2020 08:02) (107/73 - 132/68)  BP(mean): --  RR: 15 (19 Sep 2020 08:02) (14 - 16)  SpO2: 98% (19 Sep 2020 08:02) (98% - 100%)  Daily     Daily Weight in k.9 (18 Sep 2020 22:47)        RECENT LABS:                          9.8    6.20  )-----------( 201      ( 19 Sep 2020 09:40 )             27.6     09-18    140  |  106  |  60<H>  ----------------------------<  98  4.4   |  27  |  1.70<H>    Ca    9.7      18 Sep 2020 05:45    TPro  5.7<L>  /  Alb  2.6<L>  /  TBili  0.2  /  DBili  x   /  AST  12  /  ALT  22  /  AlkPhos  47  09-18    LIVER FUNCTIONS - ( 18 Sep 2020 05:45 )  Alb: 2.6 g/dL / Pro: 5.7 g/dL / ALK PHOS: 47 U/L / ALT: 22 U/L / AST: 12 U/L / GGT: x                   CAPILLARY BLOOD GLUCOSE

## 2020-09-19 NOTE — PROGRESS NOTE ADULT - SUBJECTIVE AND OBJECTIVE BOX
Patient appears comfortable in bed.  No acute complaints.  He did have a bowel movement earlier today, but unsure if there was bleeding.  No abdominal pain.    MEDICATIONS  (STANDING):  allopurinol 100 milliGRAM(s) Oral daily  aspirin enteric coated 81 milliGRAM(s) Oral daily  atorvastatin 40 milliGRAM(s) Oral at bedtime  bisacodyl Suppository 10 milliGRAM(s) Rectal daily  ferrous    sulfate 325 milliGRAM(s) Oral daily  folic acid 1 milliGRAM(s) Oral daily  gabapentin 300 milliGRAM(s) Oral three times a day  influenza   Vaccine 0.5 milliLiter(s) IntraMuscular once  metoprolol tartrate 12.5 milliGRAM(s) Oral two times a day  pantoprazole    Tablet 40 milliGRAM(s) Oral before breakfast  polyethylene glycol 3350 17 Gram(s) Oral two times a day  senna 2 Tablet(s) Oral at bedtime  tamsulosin 0.4 milliGRAM(s) Oral at bedtime  thiamine 100 milliGRAM(s) Oral daily    MEDICATIONS  (PRN):  acetaminophen   Tablet .. 650 milliGRAM(s) Oral every 6 hours PRN Temp greater or equal to 38C (100.4F), Mild Pain (1 - 3)  guaiFENesin   Syrup  (Sugar-Free) 100 milliGRAM(s) Oral every 6 hours PRN Cough  magnesium hydroxide Suspension 30 milliLiter(s) Oral once PRN Constipation  oxyCODONE    IR 5 milliGRAM(s) Oral every 8 hours PRN Severe Pain (7 - 10)      No Known Allergies      Vital Signs Last 24 Hrs  T(C): 36.4 (19 Sep 2020 08:02), Max: 36.9 (19 Sep 2020 06:04)  T(F): 97.6 (19 Sep 2020 08:02), Max: 98.4 (19 Sep 2020 06:04)  HR: 91 (19 Sep 2020 17:23) (67 - 91)  BP: 137/79 (19 Sep 2020 17:23) (107/73 - 137/79)  BP(mean): --  RR: 15 (19 Sep 2020 08:02) (15 - 16)  SpO2: 98% (19 Sep 2020 08:02) (98% - 99%)    PHYSICAL EXAM:    Constitutional: NAD, well-developed  Neck: No LAD, supple  Respiratory: clear to auscultation b/l no rales, rhonchi, wheezing  Cardiovascular: S1 and S2, RRR, no murmur  Gastrointestinal: soft, nontender to palpation  Extremities: No peripheral edema, neg clubbing, cyanosis      LABS:                        9.8    6.20  )-----------( 201      ( 19 Sep 2020 09:40 )             27.6     09-18    140  |  106  |  60<H>  ----------------------------<  98  4.4   |  27  |  1.70<H>    Ca    9.7      18 Sep 2020 05:45    TPro  5.7<L>  /  Alb  2.6<L>  /  TBili  0.2  /  DBili  x   /  AST  12  /  ALT  22  /  AlkPhos  47  09-18      LIVER FUNCTIONS - ( 18 Sep 2020 05:45 )  Alb: 2.6 g/dL / Pro: 5.7 g/dL / ALK PHOS: 47 U/L / ALT: 22 U/L / AST: 12 U/L / GGT: x            (3/20) WBC 37.30 H, H/H 11.4/34.0 L,  L, BUN 6 L, Creat 0.45 L, Glu 103 H;     (3/19) Albumin 3.5,  H

## 2020-09-19 NOTE — PROGRESS NOTE ADULT - ASSESSMENT
EVERARDO FIGUEROA is a 78yo Male with possible right sided subcortical CVA with left sided weakness and with decreased functional mobility, gait instability and ADL impairments.    #CVA  -Xarelto for Hx Afib, Guaiac+ on 9/8, no overt bleeding noted otherwise. Outpt follow up with Dr Olivo.  -ASA for CAD-no chest pain reported. PPM interrogated.   -continue PT, OT, SLP comprehensive rehab program  - Lipitor for goal LDL < 70  - GI ppx with Protonix     #Activities limited by neck pain  - Neurontin at 300mg TID. Pain and mobility improved with steroids.  -add MHP PRN, patient agreeable 9/19      #Hx Unruptured aneurysm  -outpt follow up Neurosurgery  -no headache, awake and alert    #HTN  -Lopressor BID to 12.5mg  -BP controlled 9/19  -Cardiology evaluation completed for hx recurrent pre-syncope. PPM interrogated.    #Anemia/thrombocytopenia  -Hg 7.3 this am, plan PRBC today as per heme/hospitalist, on Xarelto-GI follow up today.   -stool guaiac positive. Will f/u hospitalist as on xarelto and ASA. Hgb stable 9.8  CBc in AM 9/19    #Neck pain/ Cervical spine stenosis   - Neurontin at 300mg TID.   - Tylenol PRN, oxycodone prn with bowel regimen. Warm compress to neck ok. Soft discontinued.  - follow up CT Cspine-completed and shows severe stenosis.  - Neurosurgical follow as outpatient   -Orthopedic Surgery eval completed-medrol pack and modalities. Currently pain and knee weakness improved. Participates in therapy.    #Fever  -Low grade temps with elevated Lactate on 9/9, resolved.    -observe Off abx as per ID.      #Urinary frequency  -Flomax   -improved    GI/Bowel Mgmt - Senna,  Miralax, Dulcolax  /Bladder Mgmt - Voiding independently, PVRx1 and low    - DVT PPx: Xarelto    LABS:  CBC in AM 9/20  hospitalist f/u guaaic+ stool   EVERARDO FIGUEROA is a 80yo Male with possible right sided subcortical CVA with left sided weakness and with decreased functional mobility, gait instability and ADL impairments.    #CVA  xarelto held due to GI bleed  -ASA for CAD-no chest pain reported. PPM interrogated.   -continue PT, OT, SLP comprehensive rehab program  - Lipitor for goal LDL < 70  - GI ppx with Protonix     #Activities limited by neck pain  - Neurontin at 300mg TID. Pain and mobility improved with steroids.  -add MHP PRN, patient agreeable 9/19      #Hx Unruptured aneurysm  -outpt follow up Neurosurgery  -no headache, awake and alert    #HTN  -Lopressor BID to 12.5mg  -BP controlled 9/19  -Cardiology evaluation completed for hx recurrent pre-syncope. PPM interrogated.    #Anemia/thrombocytopenia  -Hg 7.3 this am, plan PRBC today as per heme/hospitalist, on Xarelto-GI follow up today.   -stool guaiac positive. Monitor, on ASA, xarelto held. Hgb stable 9.8 post tx  CBc in AM 9/19    #Neck pain/ Cervical spine stenosis   - Neurontin at 300mg TID.   - Tylenol PRN, oxycodone prn with bowel regimen. Warm compress to neck ok. Soft discontinued.  - follow up CT Cspine-completed and shows severe stenosis.  - Neurosurgical follow as outpatient   -Orthopedic Surgery eval completed-medrol pack and modalities. Currently pain and knee weakness improved. Participates in therapy.    #Fever  -Low grade temps with elevated Lactate on 9/9, resolved.    -observe Off abx as per ID.      #Urinary frequency  -Flomax   -improved    GI/Bowel Mgmt - Senna,  Miralax, Dulcolax. Give MOM x 1 today 9/19  /Bladder Mgmt - Voiding independently, PVRx1 and low      LABS:  CBC in AM 9/20  hospitalist f/u guaaic+ stool

## 2020-09-19 NOTE — PROGRESS NOTE ADULT - COMMENTS
Patient complains of left sided cervical pain, not new. Responds to medications, feels muscular. Also complains of constipation. No dysuria, voiding well

## 2020-09-20 ENCOUNTER — TRANSCRIPTION ENCOUNTER (OUTPATIENT)
Age: 80
End: 2020-09-20

## 2020-09-20 LAB
HCT VFR BLD CALC: 25.5 % — LOW (ref 39–50)
HGB BLD-MCNC: 8.6 G/DL — LOW (ref 13–17)
MCHC RBC-ENTMCNC: 33 PG — SIGNIFICANT CHANGE UP (ref 27–34)
MCHC RBC-ENTMCNC: 33.7 GM/DL — SIGNIFICANT CHANGE UP (ref 32–36)
MCV RBC AUTO: 97.7 FL — SIGNIFICANT CHANGE UP (ref 80–100)
NRBC # BLD: 0 /100 WBCS — SIGNIFICANT CHANGE UP (ref 0–0)
PLATELET # BLD AUTO: 193 K/UL — SIGNIFICANT CHANGE UP (ref 150–400)
RBC # BLD: 2.61 M/UL — LOW (ref 4.2–5.8)
RBC # FLD: 15.4 % — HIGH (ref 10.3–14.5)
WBC # BLD: 4.7 K/UL — SIGNIFICANT CHANGE UP (ref 3.8–10.5)
WBC # FLD AUTO: 4.7 K/UL — SIGNIFICANT CHANGE UP (ref 3.8–10.5)

## 2020-09-20 PROCEDURE — 99232 SBSQ HOSP IP/OBS MODERATE 35: CPT

## 2020-09-20 RX ORDER — SOD SULF/SODIUM/NAHCO3/KCL/PEG
1000 SOLUTION, RECONSTITUTED, ORAL ORAL ONCE
Refills: 0 | Status: COMPLETED | OUTPATIENT
Start: 2020-09-20 | End: 2020-09-20

## 2020-09-20 RX ORDER — SODIUM CHLORIDE 9 MG/ML
1000 INJECTION, SOLUTION INTRAVENOUS
Refills: 0 | Status: DISCONTINUED | OUTPATIENT
Start: 2020-09-20 | End: 2020-09-21

## 2020-09-20 RX ADMIN — POLYETHYLENE GLYCOL 3350 17 GRAM(S): 17 POWDER, FOR SOLUTION ORAL at 06:20

## 2020-09-20 RX ADMIN — GABAPENTIN 300 MILLIGRAM(S): 400 CAPSULE ORAL at 06:20

## 2020-09-20 RX ADMIN — PANTOPRAZOLE SODIUM 40 MILLIGRAM(S): 20 TABLET, DELAYED RELEASE ORAL at 06:20

## 2020-09-20 RX ADMIN — Medication 81 MILLIGRAM(S): at 12:01

## 2020-09-20 RX ADMIN — Medication 1000 MILLILITER(S): at 21:02

## 2020-09-20 RX ADMIN — SENNA PLUS 2 TABLET(S): 8.6 TABLET ORAL at 21:04

## 2020-09-20 RX ADMIN — Medication 100 MILLIGRAM(S): at 12:01

## 2020-09-20 RX ADMIN — Medication 325 MILLIGRAM(S): at 12:01

## 2020-09-20 RX ADMIN — Medication 1000 MILLILITER(S): at 15:42

## 2020-09-20 RX ADMIN — SODIUM CHLORIDE 75 MILLILITER(S): 9 INJECTION, SOLUTION INTRAVENOUS at 23:25

## 2020-09-20 RX ADMIN — Medication 650 MILLIGRAM(S): at 09:10

## 2020-09-20 RX ADMIN — Medication 1 MILLIGRAM(S): at 12:01

## 2020-09-20 RX ADMIN — Medication 12.5 MILLIGRAM(S): at 17:39

## 2020-09-20 RX ADMIN — OXYCODONE HYDROCHLORIDE 5 MILLIGRAM(S): 5 TABLET ORAL at 15:58

## 2020-09-20 RX ADMIN — GABAPENTIN 300 MILLIGRAM(S): 400 CAPSULE ORAL at 21:02

## 2020-09-20 RX ADMIN — ATORVASTATIN CALCIUM 40 MILLIGRAM(S): 80 TABLET, FILM COATED ORAL at 21:03

## 2020-09-20 RX ADMIN — TAMSULOSIN HYDROCHLORIDE 0.4 MILLIGRAM(S): 0.4 CAPSULE ORAL at 21:03

## 2020-09-20 RX ADMIN — GABAPENTIN 300 MILLIGRAM(S): 400 CAPSULE ORAL at 13:33

## 2020-09-20 RX ADMIN — Medication 650 MILLIGRAM(S): at 10:43

## 2020-09-20 RX ADMIN — OXYCODONE HYDROCHLORIDE 5 MILLIGRAM(S): 5 TABLET ORAL at 16:50

## 2020-09-20 NOTE — PROGRESS NOTE ADULT - SUBJECTIVE AND OBJECTIVE BOX
Patient is a 80y old  Male who presents with a chief complaint of 1.2 s/p right sided cva with left sided weakness (19 Sep 2020 18:58)      HPI:  78 yo male PMHx of HTN, CAD s/p stents x 3 (), CKD, Afib on Xarelto, s/p Medtronic PPM, myositis, presented to ED  with left weakness and neck pain. 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 Neurology and neurosurgery consulted. Per neuro evaluation, subcortical CVA suspected, ?right medullary/ pontine but this is uncertain/possibly due to small vessel disease versus cardioembolism related to atrial fibrillation. Unable to confirm with MRI- Pt with PPM that is not compatible with MRI. CTH repeated and stable. TTE EF 65%, no evidence of PFO. Pt with CT spine showing Multilevel cervical spondylosis with severe stenosis at C4-C5. Per Neurosurgery followup with Dr Douglas for aneurysm and C spine stenosis. No surgical intervention at this time. Evaluated by PMR and acute rehab recommended. (03 Sep 2020 15:20)      PAST MEDICAL & SURGICAL HISTORY:  Myositis    Hyperlipidemia    Hypertension    GI bleed    Diverticulitis    No significant past surgical history        MEDICATIONS  (STANDING):  allopurinol 100 milliGRAM(s) Oral daily  aspirin enteric coated 81 milliGRAM(s) Oral daily  atorvastatin 40 milliGRAM(s) Oral at bedtime  bisacodyl Suppository 10 milliGRAM(s) Rectal daily  ferrous    sulfate 325 milliGRAM(s) Oral daily  folic acid 1 milliGRAM(s) Oral daily  gabapentin 300 milliGRAM(s) Oral three times a day  influenza   Vaccine 0.5 milliLiter(s) IntraMuscular once  metoprolol tartrate 12.5 milliGRAM(s) Oral two times a day  pantoprazole    Tablet 40 milliGRAM(s) Oral before breakfast  polyethylene glycol 3350 17 Gram(s) Oral two times a day  polyethylene glycol/electrolyte Solution 1000 milliLiter(s) Oral once  polyethylene glycol/electrolyte Solution 1000 milliLiter(s) Oral once  senna 2 Tablet(s) Oral at bedtime  tamsulosin 0.4 milliGRAM(s) Oral at bedtime  thiamine 100 milliGRAM(s) Oral daily    MEDICATIONS  (PRN):  acetaminophen   Tablet .. 650 milliGRAM(s) Oral every 6 hours PRN Temp greater or equal to 38C (100.4F), Mild Pain (1 - 3)  guaiFENesin   Syrup  (Sugar-Free) 100 milliGRAM(s) Oral every 6 hours PRN Cough  magnesium hydroxide Suspension 30 milliLiter(s) Oral once PRN Constipation  oxyCODONE    IR 5 milliGRAM(s) Oral every 8 hours PRN Severe Pain (7 - 10)      Allergies    No Known Allergies    Intolerances          VITALS  80y  Vital Signs Last 24 Hrs  T(C): 36.6 (20 Sep 2020 07:41), Max: 37.1 (19 Sep 2020 20:00)  T(F): 97.9 (20 Sep 2020 07:41), Max: 98.7 (19 Sep 2020 20:00)  HR: 70 (20 Sep 2020 07:41) (70 - 91)  BP: 109/61 (20 Sep 2020 07:41) (105/64 - 137/79)  BP(mean): --  RR: 16 (20 Sep 2020 07:41) (16 - 16)  SpO2: 99% (20 Sep 2020 07:41) (99% - 99%)  Daily     Daily Weight in k.6 (19 Sep 2020 23:24)        RECENT LABS:                          8.6    4.70  )-----------( 193      ( 20 Sep 2020 06:55 )             25.5                     CAPILLARY BLOOD GLUCOSE      Review of Systems:   · Additional ROS	Patient reports that he slept well and cervical pain not significnt ly present this morning. No B/B complaints    No N/V or significant H/A aside from occasional posterior cervical-occipital pain    Physical Exam:   · Constitutional	detailed exam  · Constitutional Comments	alert, NAD Ox  3 grossly  · Respiratory	detailed exam  · Respiratory Details	respirations non-labored; clear to auscultation bilaterally no R/r/W  · Cardiovascular	detailed exam  · Cardiovascular Details	regular rate and rhythm  · Gastrointestinal	detailed exam  · GI Normal	soft; nontender; bowel sounds normal  · Extremities	detailed exam  · Extremities Comments	calves soft, NT bilaterally  no pedal edema  no tremors

## 2020-09-20 NOTE — PROGRESS NOTE ADULT - SUBJECTIVE AND OBJECTIVE BOX
HPI:  80 yo male PMHx of HTN, CAD s/p stents x 3 (2005), CKD, Afib on Xarelto, s/p Medtronic PPM, myositis, presented to ED 8/30 with left weakness and neck pain. 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 Neurology and neurosurgery consulted. Per neuro evaluation, subcortical CVA suspected, ?right medullary/ pontine but this is uncertain/possibly due to small vessel disease versus cardioembolism related to atrial fibrillation. Unable to confirm with MRI- Pt with PPM that is not compatible with MRI. CTH repeated and stable. TTE EF 65%, no evidence of PFO. Pt with CT spine showing Multilevel cervical spondylosis with severe stenosis at C4-C5. Per Neurosurgery followup with Dr Douglas for aneurysm and C spine stenosis. No surgical intervention at this time. Evaluated by PMR and acute rehab recommended. (03 Sep 2020 15:20)      Subjective  Denies dizziness, palp, sob.         PAST MEDICAL & SURGICAL HISTORY:  Myositis    Hyperlipidemia    Hypertension    GI bleed    Diverticulitis    No significant past surgical history        MedsMEDICATIONS  (STANDING):  allopurinol 100 milliGRAM(s) Oral daily  aspirin enteric coated 81 milliGRAM(s) Oral daily  atorvastatin 40 milliGRAM(s) Oral at bedtime  bisacodyl Suppository 10 milliGRAM(s) Rectal daily  dextrose 5% + sodium chloride 0.45%. 1000 milliLiter(s) (75 mL/Hr) IV Continuous <Continuous>  ferrous    sulfate 325 milliGRAM(s) Oral daily  folic acid 1 milliGRAM(s) Oral daily  gabapentin 300 milliGRAM(s) Oral three times a day  influenza   Vaccine 0.5 milliLiter(s) IntraMuscular once  metoprolol tartrate 12.5 milliGRAM(s) Oral two times a day  pantoprazole    Tablet 40 milliGRAM(s) Oral before breakfast  polyethylene glycol 3350 17 Gram(s) Oral two times a day  polyethylene glycol/electrolyte Solution 1000 milliLiter(s) Oral once  polyethylene glycol/electrolyte Solution 1000 milliLiter(s) Oral once  senna 2 Tablet(s) Oral at bedtime  tamsulosin 0.4 milliGRAM(s) Oral at bedtime  thiamine 100 milliGRAM(s) Oral daily    MEDICATIONS  (PRN):  acetaminophen   Tablet .. 650 milliGRAM(s) Oral every 6 hours PRN Temp greater or equal to 38C (100.4F), Mild Pain (1 - 3)  guaiFENesin   Syrup  (Sugar-Free) 100 milliGRAM(s) Oral every 6 hours PRN Cough  magnesium hydroxide Suspension 30 milliLiter(s) Oral once PRN Constipation  oxyCODONE    IR 5 milliGRAM(s) Oral every 8 hours PRN Severe Pain (7 - 10)      Vital Signs Last 24 Hrs  T(C): 36.6 (20 Sep 2020 07:41), Max: 37.1 (19 Sep 2020 20:00)  T(F): 97.9 (20 Sep 2020 07:41), Max: 98.7 (19 Sep 2020 20:00)  HR: 70 (20 Sep 2020 07:41) (70 - 91)  BP: 109/61 (20 Sep 2020 07:41) (105/64 - 137/79)  BP(mean): --  RR: 16 (20 Sep 2020 07:41) (16 - 16)  SpO2: 99% (20 Sep 2020 07:41) (99% - 99%)  I&O's Summary    19 Sep 2020 07:01  -  20 Sep 2020 07:00  --------------------------------------------------------  IN: 0 mL / OUT: 570 mL / NET: -570 mL    20 Sep 2020 07:01  -  20 Sep 2020 12:28  --------------------------------------------------------  IN: 0 mL / OUT: 100 mL / NET: -100 mL        PHYSICAL EXAM:  GENERAL: NAD  NECK: Supple  NERVOUS SYSTEM:  awake and alert  HEART: S1s2 NL , RRR  CHEST/LUNG: Clear to percussion bilaterally  ABDOMEN: Soft, Nontender, Nondistended; Bowel sounds present  EXTREMITIES:  No edema      LABS:(09-20 @ 06:55)                      8.6  4.70 )-----------( 193                 25.5    Neutrophils = -- (--%)  Lymphocytes = -- (--%)  Eosinophils = -- (--%)  Basophils = -- (--%)  Monocytes = -- (--%)  Bands = --%            Imaging Personally Reviewed:  [ ] YES  [ ] NO        Care Discussed with Consultants/Other Providers [ x] YES  [ ] NO        CVA  PT/OT per rehab  ASA/Statin    presyncope  Resolved    Anemia(acute on chronic(  HD stable  s/p transfusion 9/4 and 9/18   Iron  Guaiac pos-GI to do scope on Monday  Hold Xarelto  Per pt, pt had extensive anemia w/u as outpt including endo(egd/colon/capsule and bm bx)  no need for transfusion today  Will favor continuting asa 81 given h/o of stents  npo p mn exc meds  IVF to start tonight since will be npo    Thrombocytopenia  Resolved    PAF  BB with hold parameters    Neck pain with cervical stenosis not new  Pt declined lidoderm  APAP/gabapentin  Medrol dose pack completed 9/16    CKD3  Baseline Cr unknown  Ct stable  Monitor for now    fever 9/8 night  UA neg for UTI(no pyuria). urine c/s staph likely contaminant  CXR neg   Lactic acidosis resolved with IVF  Pt had neg fever w/u on 9/4 also  Elev PCT. D-dimer neg  ECHO and CT abd neg for infection  Completed 5 day course of Abx on 9/13  Monitor for now

## 2020-09-20 NOTE — PROGRESS NOTE ADULT - ASSESSMENT
EVERARDO FIGUEROA is a 78yo Male with possible right sided subcortical CVA with left sided weakness and with decreased functional mobility, gait instability and ADL impairments.    #CVA  -xarelto held due to GI bleed  -Continue ASA . PPM interrogated.   -continue PT, OT, SLP comprehensive rehab program  - Lipitor for goal LDL < 70  - GI ppx with Protonix     #Activities limited by neck pain  - Neurontin at 300mg TID. Pain and mobility improved with steroids.  -add MHP PRN, patient agreeable 9/19  -improved 9/20    #Hx Unruptured aneurysm  -outpt follow up Neurosurgery  -no headache, awake and alert  -reviewed with patient    #HTN  -Lopressor BID to 12.5mg  -BP controlled 9/20  -Cardiology appreciated, PPM interrogated.    #Anemia/thrombocytopenia  -Hg 7.3 this am, plan PRBC today as per heme/hospitalist, on Xarelto-GI follow up today.   -stool guaiac positive. Monitor, on ASA, xarelto held. Hgb 9.8 post tx-->8.6 9/20  -vitals stable. will discuss with hospitalist whether to hold ASA. monitor closely  CBc in AM 9/21    #Neck pain/ Cervical spine stenosis   - Neurontin at 300mg TID.   - Tylenol PRN, oxycodone prn with bowel regimen  -Warm compress to neck ok.  - follow up CT Cspine-completed and shows severe stenosis.  - Neurosurgical follow as outpatient . Discussed with pateint      #Urinary frequency  -Flomax   -improved    GI/Bowel Mgmt   - Senna,  Miralax, Dulcolax. Give MOM 9/19      LABS:  CBC in AM 9/21  hospitalist f/u Hgb drop

## 2020-09-21 LAB
ALBUMIN SERPL ELPH-MCNC: 2.7 G/DL — LOW (ref 3.3–5)
ALP SERPL-CCNC: 49 U/L — SIGNIFICANT CHANGE UP (ref 40–120)
ALT FLD-CCNC: 23 U/L — SIGNIFICANT CHANGE UP (ref 10–45)
ANION GAP SERPL CALC-SCNC: 7 MMOL/L — SIGNIFICANT CHANGE UP (ref 5–17)
AST SERPL-CCNC: 21 U/L — SIGNIFICANT CHANGE UP (ref 10–40)
BILIRUB SERPL-MCNC: 0.4 MG/DL — SIGNIFICANT CHANGE UP (ref 0.2–1.2)
BUN SERPL-MCNC: 34 MG/DL — HIGH (ref 7–23)
CALCIUM SERPL-MCNC: 9.2 MG/DL — SIGNIFICANT CHANGE UP (ref 8.4–10.5)
CHLORIDE SERPL-SCNC: 109 MMOL/L — HIGH (ref 96–108)
CO2 SERPL-SCNC: 27 MMOL/L — SIGNIFICANT CHANGE UP (ref 22–31)
CREAT SERPL-MCNC: 1.54 MG/DL — HIGH (ref 0.5–1.3)
GLUCOSE SERPL-MCNC: 98 MG/DL — SIGNIFICANT CHANGE UP (ref 70–99)
HCT VFR BLD CALC: 24.9 % — LOW (ref 39–50)
HGB BLD-MCNC: 8.4 G/DL — LOW (ref 13–17)
MCHC RBC-ENTMCNC: 33.2 PG — SIGNIFICANT CHANGE UP (ref 27–34)
MCHC RBC-ENTMCNC: 33.7 GM/DL — SIGNIFICANT CHANGE UP (ref 32–36)
MCV RBC AUTO: 98.4 FL — SIGNIFICANT CHANGE UP (ref 80–100)
NRBC # BLD: 0 /100 WBCS — SIGNIFICANT CHANGE UP (ref 0–0)
PLATELET # BLD AUTO: 186 K/UL — SIGNIFICANT CHANGE UP (ref 150–400)
POTASSIUM SERPL-MCNC: 4.1 MMOL/L — SIGNIFICANT CHANGE UP (ref 3.5–5.3)
POTASSIUM SERPL-SCNC: 4.1 MMOL/L — SIGNIFICANT CHANGE UP (ref 3.5–5.3)
PROT SERPL-MCNC: 5.7 G/DL — LOW (ref 6–8.3)
RBC # BLD: 2.53 M/UL — LOW (ref 4.2–5.8)
RBC # FLD: 15.4 % — HIGH (ref 10.3–14.5)
SODIUM SERPL-SCNC: 143 MMOL/L — SIGNIFICANT CHANGE UP (ref 135–145)
WBC # BLD: 4.77 K/UL — SIGNIFICANT CHANGE UP (ref 3.8–10.5)
WBC # FLD AUTO: 4.77 K/UL — SIGNIFICANT CHANGE UP (ref 3.8–10.5)

## 2020-09-21 PROCEDURE — 99232 SBSQ HOSP IP/OBS MODERATE 35: CPT

## 2020-09-21 PROCEDURE — 99233 SBSQ HOSP IP/OBS HIGH 50: CPT

## 2020-09-21 RX ORDER — RIVAROXABAN 15 MG-20MG
15 KIT ORAL
Refills: 0 | Status: DISCONTINUED | OUTPATIENT
Start: 2020-09-21 | End: 2020-09-24

## 2020-09-21 RX ADMIN — TAMSULOSIN HYDROCHLORIDE 0.4 MILLIGRAM(S): 0.4 CAPSULE ORAL at 22:03

## 2020-09-21 RX ADMIN — OXYCODONE HYDROCHLORIDE 5 MILLIGRAM(S): 5 TABLET ORAL at 00:05

## 2020-09-21 RX ADMIN — Medication 12.5 MILLIGRAM(S): at 06:23

## 2020-09-21 RX ADMIN — OXYCODONE HYDROCHLORIDE 5 MILLIGRAM(S): 5 TABLET ORAL at 22:04

## 2020-09-21 RX ADMIN — Medication 12.5 MILLIGRAM(S): at 17:45

## 2020-09-21 RX ADMIN — Medication 100 MILLIGRAM(S): at 14:36

## 2020-09-21 RX ADMIN — Medication 325 MILLIGRAM(S): at 14:27

## 2020-09-21 RX ADMIN — OXYCODONE HYDROCHLORIDE 5 MILLIGRAM(S): 5 TABLET ORAL at 01:01

## 2020-09-21 RX ADMIN — GABAPENTIN 300 MILLIGRAM(S): 400 CAPSULE ORAL at 22:04

## 2020-09-21 RX ADMIN — PANTOPRAZOLE SODIUM 40 MILLIGRAM(S): 20 TABLET, DELAYED RELEASE ORAL at 06:24

## 2020-09-21 RX ADMIN — ATORVASTATIN CALCIUM 40 MILLIGRAM(S): 80 TABLET, FILM COATED ORAL at 22:03

## 2020-09-21 RX ADMIN — RIVAROXABAN 15 MILLIGRAM(S): KIT at 17:45

## 2020-09-21 RX ADMIN — SENNA PLUS 2 TABLET(S): 8.6 TABLET ORAL at 22:04

## 2020-09-21 RX ADMIN — OXYCODONE HYDROCHLORIDE 5 MILLIGRAM(S): 5 TABLET ORAL at 22:34

## 2020-09-21 RX ADMIN — Medication 1 MILLIGRAM(S): at 14:27

## 2020-09-21 RX ADMIN — Medication 81 MILLIGRAM(S): at 14:27

## 2020-09-21 RX ADMIN — Medication 100 MILLIGRAM(S): at 14:26

## 2020-09-21 RX ADMIN — GABAPENTIN 300 MILLIGRAM(S): 400 CAPSULE ORAL at 14:36

## 2020-09-21 RX ADMIN — GABAPENTIN 300 MILLIGRAM(S): 400 CAPSULE ORAL at 06:23

## 2020-09-21 NOTE — PROGRESS NOTE ADULT - SUBJECTIVE AND OBJECTIVE BOX
CHIEF COMPLAINT: Offers no complaints, NAD, NPO for procedure this am.       HISTORY OF PRESENT ILLNESS  80 yo male PMHx of HTN, CAD s/p stents x 3 (2005), CKD, Afib on Xarelto, s/p Medtronic PPM, myositis, presented to ED 8/30 with left weakness and neck pain. 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 Neurology and neurosurgery consulted. Per neuro evaluation, subcortical CVA suspected, ?right medullary/ pontine but this is uncertain/possibly due to small vessel disease versus cardioembolism related to atrial fibrillation. Unable to confirm with MRI- Pt with PPM that is not compatible with MRI. CTH repeated and stable. TTE EF 65%, no evidence of PFO. Pt with CT spine showing Multilevel cervical spondylosis with severe stenosis at C4-C5. Per Neurosurgery followup with Dr Douglas for aneurysm and C spine stenosis. No surgical intervention at this time. Evaluated by PMR and acute rehab recommended. (03 Sep 2020 15:20)      PAST MEDICAL & SURGICAL HISTORY:  Myositis    Hyperlipidemia    Hypertension    GI bleed    Diverticulitis    No significant past surgical history    PHYSICAL EXAM  Constitutional - In bed, NAD  HEENT - EOMI  Neck - Supple  Chest - CTA bilaterally  Cardiovascular - RR  Abdomen - BS+, Soft, NTND  Extremities - No C/C/E, No calf tenderness   Skin-no rash      Neurologic Exam - Awake, Alert, without new deficits.     Balance - impaired     Psychiatric - Mood stable, Affect WNL       FUNCTIONAL PROGRESS  Gait - 75ft RW CG  ADLs - CG A  Transfers - CG  Functional transfer - CG    VITALS  Vital Signs Last 24 Hrs  T(C): 36.7 (20 Sep 2020 20:20), Max: 36.7 (20 Sep 2020 20:20)  T(F): 98.1 (20 Sep 2020 20:20), Max: 98.1 (20 Sep 2020 20:20)  HR: 68 (21 Sep 2020 06:22) (68 - 101)  BP: 124/73 (21 Sep 2020 06:22) (124/73 - 138/85)  BP(mean): --  RR: 16 (20 Sep 2020 20:20) (16 - 16)  SpO2: 99% (20 Sep 2020 20:20) (99% - 99%)        RECENT LABS                        8.4    4.77  )-----------( 186      ( 21 Sep 2020 06:56 )             24.9     09-21    143  |  109<H>  |  34<H>  ----------------------------<  98  4.1   |  27  |  1.54<H>    Ca    9.2      21 Sep 2020 06:56    TPro  5.7<L>  /  Alb  2.7<L>  /  TBili  0.4  /  DBili  x   /  AST  21  /  ALT  23  /  AlkPhos  49  09-21      LIVER FUNCTIONS - ( 21 Sep 2020 06:56 )  Alb: 2.7 g/dL / Pro: 5.7 g/dL / ALK PHOS: 49 U/L / ALT: 23 U/L / AST: 21 U/L / GGT: x             Direct LDL: 85 mg/dL (08-31-20 @ 06:52)                RADIOLOGY/OTHER RESULTS      CURRENT MEDICATIONS  MEDICATIONS  (STANDING):  allopurinol 100 milliGRAM(s) Oral daily  aspirin enteric coated 81 milliGRAM(s) Oral daily  atorvastatin 40 milliGRAM(s) Oral at bedtime  bisacodyl Suppository 10 milliGRAM(s) Rectal daily  dextrose 5% + sodium chloride 0.45%. 1000 milliLiter(s) (75 mL/Hr) IV Continuous <Continuous>  ferrous    sulfate 325 milliGRAM(s) Oral daily  folic acid 1 milliGRAM(s) Oral daily  gabapentin 300 milliGRAM(s) Oral three times a day  influenza   Vaccine 0.5 milliLiter(s) IntraMuscular once  metoprolol tartrate 12.5 milliGRAM(s) Oral two times a day  pantoprazole    Tablet 40 milliGRAM(s) Oral before breakfast  polyethylene glycol 3350 17 Gram(s) Oral two times a day  senna 2 Tablet(s) Oral at bedtime  tamsulosin 0.4 milliGRAM(s) Oral at bedtime  thiamine 100 milliGRAM(s) Oral daily    MEDICATIONS  (PRN):  acetaminophen   Tablet .. 650 milliGRAM(s) Oral every 6 hours PRN Temp greater or equal to 38C (100.4F), Mild Pain (1 - 3)  guaiFENesin   Syrup  (Sugar-Free) 100 milliGRAM(s) Oral every 6 hours PRN Cough  magnesium hydroxide Suspension 30 milliLiter(s) Oral once PRN Constipation  oxyCODONE    IR 5 milliGRAM(s) Oral every 8 hours PRN Severe Pain (7 - 10)      ASSESSMENT & PLAN    GI/Bowel Management - NPO, off Xarelto for EGD/colonoscopy today   Management - Toilet Q2,  eval for frequency completed-Flomax, PVRs low  Skin - Turn Q2  Pain - Tylenol PRN, Oxycodone with bowel regimen, Neurontin TID at 300mg. Ortho eval completed. On medrol with pain much improved-tolerating therapy.  DVT PPX - Xarelto on hold 48h   Diet - reg    Continue comprehensive acute rehab program consisting of 3hrs/day of OT/PT and SLP. Hold am therapy for procedure. Resume post.

## 2020-09-21 NOTE — PROGRESS NOTE ADULT - SUBJECTIVE AND OBJECTIVE BOX
HPI:  78 yo male PMHx of HTN, CAD s/p stents x 3 (2005), CKD, Afib on Xarelto, s/p Medtronic PPM, myositis, presented to ED 8/30 with left weakness and neck pain. 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 Neurology and neurosurgery consulted. Per neuro evaluation, subcortical CVA suspected, ?right medullary/ pontine but this is uncertain/possibly due to small vessel disease versus cardioembolism related to atrial fibrillation. Unable to confirm with MRI- Pt with PPM that is not compatible with MRI. CTH repeated and stable. TTE EF 65%, no evidence of PFO. Pt with CT spine showing Multilevel cervical spondylosis with severe stenosis at C4-C5. Per Neurosurgery followup with Dr Douglas for aneurysm and C spine stenosis. No surgical intervention at this time. Evaluated by PMR and acute rehab recommended. (03 Sep 2020 15:20)      Subjective  Tolerated bowel prep.           PAST MEDICAL & SURGICAL HISTORY:  Myositis    Hyperlipidemia    Hypertension    GI bleed    Diverticulitis    No significant past surgical history        MedsMEDICATIONS  (STANDING):  allopurinol 100 milliGRAM(s) Oral daily  aspirin enteric coated 81 milliGRAM(s) Oral daily  atorvastatin 40 milliGRAM(s) Oral at bedtime  bisacodyl Suppository 10 milliGRAM(s) Rectal daily  dextrose 5% + sodium chloride 0.45%. 1000 milliLiter(s) (75 mL/Hr) IV Continuous <Continuous>  ferrous    sulfate 325 milliGRAM(s) Oral daily  folic acid 1 milliGRAM(s) Oral daily  gabapentin 300 milliGRAM(s) Oral three times a day  influenza   Vaccine 0.5 milliLiter(s) IntraMuscular once  metoprolol tartrate 12.5 milliGRAM(s) Oral two times a day  pantoprazole    Tablet 40 milliGRAM(s) Oral before breakfast  polyethylene glycol 3350 17 Gram(s) Oral two times a day  senna 2 Tablet(s) Oral at bedtime  tamsulosin 0.4 milliGRAM(s) Oral at bedtime  thiamine 100 milliGRAM(s) Oral daily    MEDICATIONS  (PRN):  acetaminophen   Tablet .. 650 milliGRAM(s) Oral every 6 hours PRN Temp greater or equal to 38C (100.4F), Mild Pain (1 - 3)  guaiFENesin   Syrup  (Sugar-Free) 100 milliGRAM(s) Oral every 6 hours PRN Cough  magnesium hydroxide Suspension 30 milliLiter(s) Oral once PRN Constipation  oxyCODONE    IR 5 milliGRAM(s) Oral every 8 hours PRN Severe Pain (7 - 10)      Vital Signs Last 24 Hrs  T(C): 36.7 (20 Sep 2020 20:20), Max: 36.7 (20 Sep 2020 20:20)  T(F): 98.1 (20 Sep 2020 20:20), Max: 98.1 (20 Sep 2020 20:20)  HR: 68 (21 Sep 2020 06:22) (68 - 101)  BP: 124/73 (21 Sep 2020 06:22) (124/73 - 138/85)  BP(mean): --  RR: 16 (20 Sep 2020 20:20) (16 - 16)  SpO2: 99% (20 Sep 2020 20:20) (99% - 99%)  I&O's Summary    20 Sep 2020 07:01  -  21 Sep 2020 07:00  --------------------------------------------------------  IN: 1000 mL / OUT: 400 mL / NET: 600 mL        PHYSICAL EXAM:  GENERAL: NAD  NECK: Supple  NERVOUS SYSTEM:  awake and alert  HEART: S1s2 NL , RRR  CHEST/LUNG: Clear to percussion bilaterally  ABDOMEN: Soft, Nontender, Nondistended; Bowel sounds present  EXTREMITIES:  No edema      LABS:(09-21 @ 06:56)                      8.4  4.77 )-----------( 186                 24.9    Neutrophils = -- (--%)  Lymphocytes = -- (--%)  Eosinophils = -- (--%)  Basophils = -- (--%)  Monocytes = -- (--%)  Bands = --%    09-21    143  |  109<H>  |  34<H>  ----------------------------<  98  4.1   |  27  |  1.54<H>    Ca    9.2      21 Sep 2020 06:56    TPro  5.7<L>  /  Alb  2.7<L>  /  TBili  0.4  /  DBili  x   /  AST  21  /  ALT  23  /  AlkPhos  49  09-21      Care Discussed with Consultants/Other Providers [ x] YES  [ ] NO        CVA  PT/OT per rehab  ASA/Statin    presyncope  Resolved    Anemia(acute on chronic(  HD stable  s/p transfusion 9/4 and 9/18   Iron  Guaiac pos-GI to do scope Today  Hold Xarelto  Per pt, pt had extensive anemia w/u as outpt including endo(egd/colon/capsule and bm bx)  no need for transfusion today  Will favor continuing asa 81 given h/o of stents  NPO pending scope and IVF while NPO.     Thrombocytopenia  Resolved    PAF  BB with hold parameters    Neck pain with cervical stenosis not new  Pt declined lidoderm  APAP/gabapentin  Medrol dose pack completed 9/16    CKD3  Baseline Cr unknown  Ct stable  Monitor for now    fever 9/8 night  UA neg for UTI(no pyuria). urine c/s staph likely contaminant  CXR neg   Lactic acidosis resolved with IVF  Pt had neg fever w/u on 9/4 also  Elev PCT. D-dimer neg  ECHO and CT abd neg for infection  Completed 5 day course of Abx on 9/13  Monitor for now

## 2020-09-21 NOTE — PROGRESS NOTE ADULT - SUBJECTIVE AND OBJECTIVE BOX
Patient is a 80y old  Male who presents with a chief complaint of 1.2 s/p right sided cva with left sided weakness (20 Sep 2020 12:28)    HPI:  80 yo male PMHx of HTN, CAD s/p stents x 3 (2005), CKD, Afib on Xarelto, s/p Medtr[employer] PPM, myositis, presented to ED 8/30 with left weakness and neck pain. 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 Neurology and neurosurgery consulted. Per neuro evaluation, subcortical CVA suspected, ?right medullary/ pontine but this is uncertain/possibly due to small vessel disease versus cardioembolism related to atrial fibrillation. Unable to confirm with MRI- Pt with PPM that is not compatible with MRI. CTH repeated and stable. TTE EF 65%, no evidence of PFO. Pt with CT spine showing Multilevel cervical spondylosis with severe stenosis at C4-C5. Per Neurosurgery followup with Dr Douglas for aneurysm and C spine stenosis. No surgical intervention at this time. Evaluated by PMR and acute rehab recommended. (03 Sep 2020 15:20)    voiding better with flomax    Interval Events:  Patient seen and examined at bedside.    MEDICATIONS:  MEDICATIONS  (STANDING):  allopurinol 100 milliGRAM(s) Oral daily  aspirin enteric coated 81 milliGRAM(s) Oral daily  atorvastatin 40 milliGRAM(s) Oral at bedtime  bisacodyl Suppository 10 milliGRAM(s) Rectal daily  dextrose 5% + sodium chloride 0.45%. 1000 milliLiter(s) (75 mL/Hr) IV Continuous <Continuous>  ferrous    sulfate 325 milliGRAM(s) Oral daily  folic acid 1 milliGRAM(s) Oral daily  gabapentin 300 milliGRAM(s) Oral three times a day  influenza   Vaccine 0.5 milliLiter(s) IntraMuscular once  metoprolol tartrate 12.5 milliGRAM(s) Oral two times a day  pantoprazole    Tablet 40 milliGRAM(s) Oral before breakfast  polyethylene glycol 3350 17 Gram(s) Oral two times a day  senna 2 Tablet(s) Oral at bedtime  tamsulosin 0.4 milliGRAM(s) Oral at bedtime  thiamine 100 milliGRAM(s) Oral daily    MEDICATIONS  (PRN):  acetaminophen   Tablet .. 650 milliGRAM(s) Oral every 6 hours PRN Temp greater or equal to 38C (100.4F), Mild Pain (1 - 3)  guaiFENesin   Syrup  (Sugar-Free) 100 milliGRAM(s) Oral every 6 hours PRN Cough  magnesium hydroxide Suspension 30 milliLiter(s) Oral once PRN Constipation  oxyCODONE    IR 5 milliGRAM(s) Oral every 8 hours PRN Severe Pain (7 - 10)      Allergies    No Known Allergies    Intolerances        T(C): 36.7 (09-20-20 @ 20:20), Max: 37.1 (09-19-20 @ 20:00)  T(F): 98.1 (09-20-20 @ 20:20), Max: 98.7 (09-19-20 @ 20:00)  HR: 68 (09-21-20 @ 06:22) (68 - 101)  BP: 124/73 (09-21-20 @ 06:22) (105/64 - 138/85)  RR: 16 (09-20-20 @ 20:20) (16 - 16)  SpO2: 99% (09-20-20 @ 20:20) (99% - 99%)              LABS:      CBC Full  -  ( 21 Sep 2020 06:56 )  WBC Count : 4.77 K/uL  RBC Count : 2.53 M/uL  Hemoglobin : 8.4 g/dL  Hematocrit : 24.9 %  Platelet Count - Automated : 186 K/uL  Mean Cell Volume : 98.4 fl  Mean Cell Hemoglobin : 33.2 pg  Mean Cell Hemoglobin Concentration : 33.7 gm/dL  Auto Neutrophil # : x  Auto Lymphocyte # : x  Auto Monocyte # : x  Auto Eosinophil # : x  Auto Basophil # : x  Auto Neutrophil % : x  Auto Lymphocyte % : x  Auto Monocyte % : x  Auto Eosinophil % : x  Auto Basophil % : x    09-21    143  |  109<H>  |  34<H>  ----------------------------<  98  4.1   |  27  |  1.54<H>    Ca    9.2      21 Sep 2020 06:56    TPro  5.7<L>  /  Alb  2.7<L>  /  TBili  0.4  /  DBili  x   /  AST  21  /  ALT  23  /  AlkPhos  49  09-21                  Physical Exam    Constitutional: alert, no acute distress    Abdomen: soft, nontender, nondistended, no HSM    Genitourinary: no bladder distention

## 2020-09-21 NOTE — PROGRESS NOTE ADULT - SUBJECTIVE AND OBJECTIVE BOX
Pt sitting in his wheel chair, denies any abdominal pain or tenderness.         HPI:  80 yo male PMHx of HTN, CAD s/p stents x 3 (), CKD, Afib on Xarelto, s/p Medtronic PPM, myositis, presented to ED  with left weakness and neck pain. 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 Neurology and neurosurgery consulted. Per neuro evaluation, subcortical CVA suspected, ?right medullary/ pontine but this is uncertain/possibly due to small vessel disease versus cardioembolism related to atrial fibrillation. Unable to confirm with MRI- Pt with PPM that is not compatible with MRI. CTH repeated and stable. TTE EF 65%, no evidence of PFO. Pt with CT spine showing Multilevel cervical spondylosis with severe stenosis at C4-C5. Per Neurosurgery followup with Dr Douglas for aneurysm and C spine stenosis. No surgical intervention at this time. Evaluated by PMR and acute rehab recommended. (03 Sep 2020 15:20)      No Known Allergies      acetaminophen   Tablet .. 650 milliGRAM(s) Oral every 6 hours PRN  allopurinol 100 milliGRAM(s) Oral daily  aspirin enteric coated 81 milliGRAM(s) Oral daily  atorvastatin 40 milliGRAM(s) Oral at bedtime  bisacodyl Suppository 10 milliGRAM(s) Rectal daily  dextrose 5% + sodium chloride 0.45%. 1000 milliLiter(s) IV Continuous <Continuous>  ferrous    sulfate 325 milliGRAM(s) Oral daily  folic acid 1 milliGRAM(s) Oral daily  gabapentin 300 milliGRAM(s) Oral three times a day  guaiFENesin   Syrup  (Sugar-Free) 100 milliGRAM(s) Oral every 6 hours PRN  influenza   Vaccine 0.5 milliLiter(s) IntraMuscular once  magnesium hydroxide Suspension 30 milliLiter(s) Oral once PRN  metoprolol tartrate 12.5 milliGRAM(s) Oral two times a day  oxyCODONE    IR 5 milliGRAM(s) Oral every 8 hours PRN  pantoprazole    Tablet 40 milliGRAM(s) Oral before breakfast  polyethylene glycol 3350 17 Gram(s) Oral two times a day  senna 2 Tablet(s) Oral at bedtime  tamsulosin 0.4 milliGRAM(s) Oral at bedtime  thiamine 100 milliGRAM(s) Oral daily        FAMILY HISTORY:  Family history of cerebrovascular accident (CVA) (Sibling)  Multiple brothers    Family history of heart disease          Review of Systems:    General:  No wt loss, fevers, chills, night sweats,fatigue,   Eyes:  Good vision, no reported pain  ENT:  No sore throat, pain, runny nose, dysphagia  CV:  No pain, palpitatioins, hypo/hypertension  Resp:  No dyspnea, cough, tachypnea, wheezing  GI:  No pain, No nausea, No vomiting, No diarrhea, No constipatiion, No weight loss, No fever, No pruritis, No rectal bleeding, No tarry stools, No dysphagia,  :  No pain, bleeding, incontinence, nocturia  Muscle:  No pain, weakness  Breast:  No pain, abscess, mass, discharge  Neuro:  No weakness, tingling, memory problems  Psych:  No fatigue, insomnia, mood problems, depression  Endocrine:  No polyuria, polydypsia, cold/heat intolerance  Heme:  No petechiae, ecchymosis, easy bruisability  Skin:  No rash, tattoos, scars, edema    Relevant Family History:       Relevant Social History:       Physical Exam:    Vital Signs:  Vital Signs Last 24 Hrs  T(C): 36.7 (20 Sep 2020 20:20), Max: 36.7 (20 Sep 2020 20:20)  T(F): 98.1 (20 Sep 2020 20:20), Max: 98.1 (20 Sep 2020 20:20)  HR: 68 (21 Sep 2020 06:22) (68 - 101)  BP: 124/73 (21 Sep 2020 06:22) (124/73 - 138/85)  BP(mean): --  RR: 16 (20 Sep 2020 20:20) (16 - 16)  SpO2: 99% (20 Sep 2020 20:20) (99% - 99%)  Daily     Daily Weight in k.2 (20 Sep 2020 23:20)    General:  Appears stated age, well-groomed, well-nourished, no distress  HEENT:  NC/AT,  conjunctivae clear and pink, no thyromegaly, nodules, adenopathy, no JVD  Chest:  Full & symmetric excursion, no increased effort, breath sounds clear  Cardiovascular:  Regular rhythm, S1, S2, no murmur/rub/S3/S4, no abdominal bruit, no edema  Abdomen:  Soft, non-tender, non-distended, normoactive bowel sounds,  no masses ,no hepatosplenomeagaly, no signs of chronic liver disease  Extremities:  no cyanosis,clubbing or edema  Skin:  No rash/erythema/ecchymoses/petechiae/wounds/abscess/warm/dry  Neuro/Psych:  Alert, oriented, no asterixis, no tremor, no encephalopathy    Laboratory:                            8.4    4.77  )-----------( 186      ( 21 Sep 2020 06:56 )             24.9     09-    143  |  109<H>  |  34<H>  ----------------------------<  98  4.1   |  27  |  1.54<H>    Ca    9.2      21 Sep 2020 06:56    TPro  5.7<L>  /  Alb  2.7<L>  /  TBili  0.4  /  DBili  x   /  AST  21  /  ALT  23  /  AlkPhos  49  -21    LIVER FUNCTIONS - ( 21 Sep 2020 06:56 )  Alb: 2.7 g/dL / Pro: 5.7 g/dL / ALK PHOS: 49 U/L / ALT: 23 U/L / AST: 21 U/L / GGT: x                 Imaging:      Assessment:  80 yo male PMHx of HTN, CAD s/p stents x 3 (), CKD, Afib on Xarelto, s/p Medtronic PPM, myositis, presented to ED  with left weakness and neck pain. 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 Neurology and neurosurgery consulted. Per neuro evaluation, subcortical CVA suspected, ?right medullary/ pontine but this is uncertain/possibly due to small vessel disease versus cardioembolism related to atrial fibrillation. Unable to confirm with MRI- Pt with PPM that is not compatible with MRI. CTH repeated and stable. TTE EF 65%, no evidence of PFO. Pt with CT spine showing Multilevel cervical spondylosis with severe stenosis at C4-C5. Per Neurosurgery followup with Dr Douglas for aneurysm and C spine stenosis. GI consulted for new drop in H/H      Plan:     Anemia  -New drop in H/H from 9.2<8.2<7.7<7.3, requiring 1U PRBC on   -Pt is NPO since midnight for EGD/Colonoscopy today with Dr. Lu  -Complete bowl regimen   -COVID PCR negative   -Xatrelto held since

## 2020-09-21 NOTE — PROGRESS NOTE ADULT - ASSESSMENT
EVERARDO FIGUEROA is a 80yo Male with possible right sided subcortical CVA with left sided weakness and with decreased functional mobility, gait instability and ADL impairments.    - COMORBIDITES/ACTIVE MEDICAL ISSUES     Gait Instability, ADL impairments and Functional impairments:  Comprehensive Rehab Program of PT/OT/SLP- am therapy on hold for procedure. Family training planned today.     #CVA  - PT/OT/SLP continues. Neurontin at 300mg TID. Pain and mobility improved with steroids.  -Xarelto for Hx Afib on hold, Guaiac+, EGD/colo today. NPO  -ASA for CAD-no chest pain reported. PPM interrogated.   - Lipitor for goal LDL < 70  - GI ppx with Protonix       #Hx Unruptured aneurysm  -outpt follow up Neurosurgery  -no headache, awake and alert    #HTN  -Lopressor BID to 12.5mg as per hospitalist plan. Cardiology evaluation completed for hx recurrent pre-syncope. PPM interrogated.    #Anemia/thrombocytopenia  -anemia, PRBC 1unit on 9/18 as per heme/hospitalist, off Xarelto-GI follow up today. Guaiac+. No overt bleeding.   -labs following  -hematology following    #Neck pain/ Cervical spine stenosis   - Neurontin at 300mg TID.   - Tylenol PRN, oxycodone prn with bowel regimen. Warm compress to neck ok. Soft discontinued.  - follow up CT Cspine-completed and shows severe stenosis.  - Neurosurgical follow as outpatient   -Orthopedic Surgery eval completed-medrol pack and modalities. Currently pain and knee weakness improved. Participates in therapy.    #Fever  -Low grade temps with elevated Lactate on 9/9, resolved.  observe Off abx as per ID.      #Urinary frequency  -improved   -Flomax added by  for BPH. PVRs low.    GI/Bowel Mgmt - Senna,  Miralax, Dulcolax  /Bladder Mgmt - Voiding independently, PVRx1 and low      Precautions / PROPHYLAXIS:   - Falls, Cardiac, Seizure   - Ortho: Weight bearing status: WBAT   - Lungs: Aspiration, Incentive Spirometer   - Pressure injury/Skin: Turn Q2hrs while in bed, OOB to Chair, PT/OT    - DVT: Xarelto on hold

## 2020-09-21 NOTE — PROGRESS NOTE ADULT - ATTENDING COMMENTS
Upper and lower GI endoscopy completed today, case discussed with GI- no acute changes, no source if bleeding identified  Will restart full dose AC/ Xarelto - cleared from GI standpoint  Will ask Hematology to comment on possible etiology of anemia   Neurological exam is stable   Good spirits  Multidisciplinary team meeting today:  patient's functional goals and needs, functional and clinical  progress were discussed, barriers to discharge were identified. Anticipate discharge home with home care, 24/7 supervision for safety    EDOD 9/24/20

## 2020-09-22 PROCEDURE — 99233 SBSQ HOSP IP/OBS HIGH 50: CPT

## 2020-09-22 RX ADMIN — POLYETHYLENE GLYCOL 3350 17 GRAM(S): 17 POWDER, FOR SOLUTION ORAL at 06:14

## 2020-09-22 RX ADMIN — OXYCODONE HYDROCHLORIDE 5 MILLIGRAM(S): 5 TABLET ORAL at 06:14

## 2020-09-22 RX ADMIN — GABAPENTIN 300 MILLIGRAM(S): 400 CAPSULE ORAL at 06:14

## 2020-09-22 RX ADMIN — Medication 81 MILLIGRAM(S): at 11:09

## 2020-09-22 RX ADMIN — Medication 650 MILLIGRAM(S): at 22:36

## 2020-09-22 RX ADMIN — Medication 100 MILLIGRAM(S): at 11:09

## 2020-09-22 RX ADMIN — ATORVASTATIN CALCIUM 40 MILLIGRAM(S): 80 TABLET, FILM COATED ORAL at 21:35

## 2020-09-22 RX ADMIN — Medication 650 MILLIGRAM(S): at 21:36

## 2020-09-22 RX ADMIN — RIVAROXABAN 15 MILLIGRAM(S): KIT at 17:33

## 2020-09-22 RX ADMIN — Medication 1 MILLIGRAM(S): at 11:09

## 2020-09-22 RX ADMIN — Medication 12.5 MILLIGRAM(S): at 06:14

## 2020-09-22 RX ADMIN — Medication 325 MILLIGRAM(S): at 11:09

## 2020-09-22 RX ADMIN — SENNA PLUS 2 TABLET(S): 8.6 TABLET ORAL at 21:35

## 2020-09-22 RX ADMIN — GABAPENTIN 300 MILLIGRAM(S): 400 CAPSULE ORAL at 21:35

## 2020-09-22 RX ADMIN — POLYETHYLENE GLYCOL 3350 17 GRAM(S): 17 POWDER, FOR SOLUTION ORAL at 17:33

## 2020-09-22 RX ADMIN — TAMSULOSIN HYDROCHLORIDE 0.4 MILLIGRAM(S): 0.4 CAPSULE ORAL at 21:35

## 2020-09-22 RX ADMIN — GABAPENTIN 300 MILLIGRAM(S): 400 CAPSULE ORAL at 14:59

## 2020-09-22 RX ADMIN — PANTOPRAZOLE SODIUM 40 MILLIGRAM(S): 20 TABLET, DELAYED RELEASE ORAL at 06:14

## 2020-09-22 RX ADMIN — Medication 12.5 MILLIGRAM(S): at 17:33

## 2020-09-22 NOTE — PROGRESS NOTE ADULT - SUBJECTIVE AND OBJECTIVE BOX
Patient is a 80y old  Male who presents with a chief complaint of 1.2 s/p right sided cva with left sided weakness (21 Sep 2020 12:45)    HPI:  78 yo male PMHx of HTN, CAD s/p stents x 3 (2005), CKD, Afib on Xarelto, s/p Medtronic PPM, myositis, presented to ED 8/30 with left weakness and neck pain. 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 Neurology and neurosurgery consulted. Per neuro evaluation, subcortical CVA suspected, ?right medullary/ pontine but this is uncertain/possibly due to small vessel disease versus cardioembolism related to atrial fibrillation. Unable to confirm with MRI- Pt with PPM that is not compatible with MRI. CTH repeated and stable. TTE EF 65%, no evidence of PFO. Pt with CT spine showing Multilevel cervical spondylosis with severe stenosis at C4-C5. Per Neurosurgery followup with Dr Douglas for aneurysm and C spine stenosis. No surgical intervention at this time. Evaluated by PMR and acute rehab recommended. (03 Sep 2020 15:20)    on flomax, no dysuria/hematuria    Interval Events:  Patient seen and examined at bedside.    MEDICATIONS:  MEDICATIONS  (STANDING):  allopurinol 100 milliGRAM(s) Oral daily  aspirin enteric coated 81 milliGRAM(s) Oral daily  atorvastatin 40 milliGRAM(s) Oral at bedtime  bisacodyl Suppository 10 milliGRAM(s) Rectal daily  ferrous    sulfate 325 milliGRAM(s) Oral daily  folic acid 1 milliGRAM(s) Oral daily  gabapentin 300 milliGRAM(s) Oral three times a day  influenza   Vaccine 0.5 milliLiter(s) IntraMuscular once  metoprolol tartrate 12.5 milliGRAM(s) Oral two times a day  pantoprazole    Tablet 40 milliGRAM(s) Oral before breakfast  polyethylene glycol 3350 17 Gram(s) Oral two times a day  rivaroxaban 15 milliGRAM(s) Oral with dinner  senna 2 Tablet(s) Oral at bedtime  tamsulosin 0.4 milliGRAM(s) Oral at bedtime  thiamine 100 milliGRAM(s) Oral daily    MEDICATIONS  (PRN):  acetaminophen   Tablet .. 650 milliGRAM(s) Oral every 6 hours PRN Temp greater or equal to 38C (100.4F), Mild Pain (1 - 3)  guaiFENesin   Syrup  (Sugar-Free) 100 milliGRAM(s) Oral every 6 hours PRN Cough  magnesium hydroxide Suspension 30 milliLiter(s) Oral once PRN Constipation  oxyCODONE    IR 5 milliGRAM(s) Oral every 8 hours PRN Severe Pain (7 - 10)      Allergies    No Known Allergies    Intolerances        T(C): 36.7 (09-22-20 @ 08:17), Max: 36.8 (09-21-20 @ 21:43)  T(F): 98.1 (09-22-20 @ 08:17), Max: 98.3 (09-21-20 @ 21:43)  HR: 62 (09-22-20 @ 08:17) (62 - 101)  BP: 104/62 (09-22-20 @ 08:17) (104/62 - 138/85)  RR: 15 (09-22-20 @ 08:17) (15 - 16)  SpO2: 98% (09-22-20 @ 08:17) (98% - 99%)              LABS:      CBC Full  -  ( 21 Sep 2020 06:56 )  WBC Count : 4.77 K/uL  RBC Count : 2.53 M/uL  Hemoglobin : 8.4 g/dL  Hematocrit : 24.9 %  Platelet Count - Automated : 186 K/uL  Mean Cell Volume : 98.4 fl  Mean Cell Hemoglobin : 33.2 pg  Mean Cell Hemoglobin Concentration : 33.7 gm/dL  Auto Neutrophil # : x  Auto Lymphocyte # : x  Auto Monocyte # : x  Auto Eosinophil # : x  Auto Basophil # : x  Auto Neutrophil % : x  Auto Lymphocyte % : x  Auto Monocyte % : x  Auto Eosinophil % : x  Auto Basophil % : x    09-21    143  |  109<H>  |  34<H>  ----------------------------<  98  4.1   |  27  |  1.54<H>    Ca    9.2      21 Sep 2020 06:56    TPro  5.7<L>  /  Alb  2.7<L>  /  TBili  0.4  /  DBili  x   /  AST  21  /  ALT  23  /  AlkPhos  49  09-21                  Physical Exam    Constitutional: alert, no acute distress    Abdomen: soft, nontender, nondistended, no HSM    Genitourinary: no bladder distention

## 2020-09-22 NOTE — PROGRESS NOTE ADULT - SUBJECTIVE AND OBJECTIVE BOX
Pt lying comfortably in bed.  Denies any blood stool or abdominal pain.         HPI:  78 yo male PMHx of HTN, CAD s/p stents x 3 (), CKD, Afib on Xarelto, s/p Medtronic PPM, myositis, presented to ED  with left weakness and neck pain. 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 Neurology and neurosurgery consulted. Per neuro evaluation, subcortical CVA suspected, ?right medullary/ pontine but this is uncertain/possibly due to small vessel disease versus cardioembolism related to atrial fibrillation. Unable to confirm with MRI- Pt with PPM that is not compatible with MRI. CTH repeated and stable. TTE EF 65%, no evidence of PFO. Pt with CT spine showing Multilevel cervical spondylosis with severe stenosis at C4-C5. Per Neurosurgery followup with Dr Douglas for aneurysm and C spine stenosis. No surgical intervention at this time. Evaluated by PMR and acute rehab recommended. (03 Sep 2020 15:20)      No Known Allergies      acetaminophen   Tablet .. 650 milliGRAM(s) Oral every 6 hours PRN  allopurinol 100 milliGRAM(s) Oral daily  aspirin enteric coated 81 milliGRAM(s) Oral daily  atorvastatin 40 milliGRAM(s) Oral at bedtime  bisacodyl Suppository 10 milliGRAM(s) Rectal daily  ferrous    sulfate 325 milliGRAM(s) Oral daily  folic acid 1 milliGRAM(s) Oral daily  gabapentin 300 milliGRAM(s) Oral three times a day  guaiFENesin   Syrup  (Sugar-Free) 100 milliGRAM(s) Oral every 6 hours PRN  influenza   Vaccine 0.5 milliLiter(s) IntraMuscular once  magnesium hydroxide Suspension 30 milliLiter(s) Oral once PRN  metoprolol tartrate 12.5 milliGRAM(s) Oral two times a day  oxyCODONE    IR 5 milliGRAM(s) Oral every 8 hours PRN  pantoprazole    Tablet 40 milliGRAM(s) Oral before breakfast  polyethylene glycol 3350 17 Gram(s) Oral two times a day  rivaroxaban 15 milliGRAM(s) Oral with dinner  senna 2 Tablet(s) Oral at bedtime  tamsulosin 0.4 milliGRAM(s) Oral at bedtime  thiamine 100 milliGRAM(s) Oral daily        FAMILY HISTORY:  Family history of cerebrovascular accident (CVA) (Sibling)  Multiple brothers    Family history of heart disease          Review of Systems:    General:  No wt loss, fevers, chills, night sweats,fatigue,   Eyes:  Good vision, no reported pain  ENT:  No sore throat, pain, runny nose, dysphagia  CV:  No pain, palpitatioins, hypo/hypertension  Resp:  No dyspnea, cough, tachypnea, wheezing  GI:  No pain, No nausea, No vomiting, No diarrhea, No constipatiion, No weight loss, No fever, No pruritis, No rectal bleeding, No tarry stools, No dysphagia,  :  No pain, bleeding, incontinence, nocturia  Muscle:  No pain, weakness  Breast:  No pain, abscess, mass, discharge  Neuro:  No weakness, tingling, memory problems  Psych:  No fatigue, insomnia, mood problems, depression  Endocrine:  No polyuria, polydypsia, cold/heat intolerance  Heme:  No petechiae, ecchymosis, easy bruisability  Skin:  No rash, tattoos, scars, edema    Relevant Family History:       Relevant Social History:       Physical Exam:    Vital Signs:  Vital Signs Last 24 Hrs  T(C): 36.7 (22 Sep 2020 08:17), Max: 36.8 (21 Sep 2020 21:43)  T(F): 98.1 (22 Sep 2020 08:17), Max: 98.3 (21 Sep 2020 21:43)  HR: 62 (22 Sep 2020 08:17) (62 - 66)  BP: 104/62 (22 Sep 2020 08:17) (104/62 - 118/71)  BP(mean): --  RR: 15 (22 Sep 2020 08:17) (15 - 16)  SpO2: 98% (22 Sep 2020 08:17) (98% - 99%)  Daily     Daily Weight in k.1 (21 Sep 2020 22:00)    General:  Appears stated age, well-groomed, well-nourished, no distress  HEENT:  NC/AT,  conjunctivae clear and pink, no thyromegaly, nodules, adenopathy, no JVD  Chest:  Full & symmetric excursion, no increased effort, breath sounds clear  Cardiovascular:  Regular rhythm, S1, S2, no murmur/rub/S3/S4, no abdominal bruit, no edema  Abdomen:  Soft, non-tender, non-distended, normoactive bowel sounds,  no masses ,no hepatosplenomeagaly, no signs of chronic liver disease  Extremities:  no cyanosis,clubbing or edema  Skin:  No rash/erythema/ecchymoses/petechiae/wounds/abscess/warm/dry  Neuro/Psych:  Alert, oriented, no asterixis, no tremor, no encephalopathy    Laboratory:                            8.4    4.77  )-----------( 186      ( 21 Sep 2020 06:56 )             24.9     09-21    143  |  109<H>  |  34<H>  ----------------------------<  98  4.1   |  27  |  1.54<H>    Ca    9.2      21 Sep 2020 06:56    TPro  5.7<L>  /  Alb  2.7<L>  /  TBili  0.4  /  DBili  x   /  AST  21  /  ALT  23  /  AlkPhos  49  09-21    LIVER FUNCTIONS - ( 21 Sep 2020 06:56 )  Alb: 2.7 g/dL / Pro: 5.7 g/dL / ALK PHOS: 49 U/L / ALT: 23 U/L / AST: 21 U/L / GGT: x                 Imaging:      Assessment:    78 yo male PMHx of HTN, CAD s/p stents x 3 (), CKD, Afib on Xarelto, s/p Medtronic PPM, myositis, presented to ED  with left weakness and neck pain. 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 Neurology and neurosurgery consulted. Per neuro evaluation, subcortical CVA suspected, ?right medullary/ pontine but this is uncertain/possibly due to small vessel disease versus cardioembolism related to atrial fibrillation. Unable to confirm with MRI- Pt with PPM that is not compatible with MRI. CTH repeated and stable. TTE EF 65%, no evidence of PFO. Pt with CT spine showing Multilevel cervical spondylosis with severe stenosis at C4-C5. Per Neurosurgery followup with Dr Douglas for aneurysm and C spine stenosis. GI consulted for new drop in H/H      Plan:     Anemia  -New drop in H/H from 9.2<8.2<7.7<7.3, requiring 1U PRBC on   -s/p EGD/Colonoscopy with Dr. Lu yesterday with no significant findings. No bleeding was noted.   -If further drop in H/H, might need capsule study.    -pt will follow up with his private GI MD as outpatient  -Will sign off    Pt seen on AM rounds with Dr. Lu

## 2020-09-22 NOTE — PROGRESS NOTE ADULT - ASSESSMENT
EVERARDO FIGUEROA is a 78yo Male with possible right sided subcortical CVA with left sided weakness and with decreased functional mobility, gait instability and ADL impairments.    - COMORBIDITES/ACTIVE MEDICAL ISSUES     Gait Instability, ADL impairments and Functional impairments:  Comprehensive Rehab Program of PT/OT/SLP- am therapy on hold for procedure. Family training planned today.     #CVA  - PT/OT/SLP  -Xarelto for Hx Afib restarted   - PPM interrogated.   - Lipitor for goal LDL < 70  - GI ppx with Protonix       #Hx  of Unruptured  ACOM aneurysm  -outpatient  follow up with Neurosurgery  -no headache, awake and alert, no new  complaints    #HTN  -Lopressor BID to 12.5mg as per hospitalist plan. Cardiology evaluation completed for hx recurrent pre-syncope. PPM interrogated.    #Anemia/thrombocytopenia  -anemia, PRBC 1unit on 9/18 as per heme/hospitalist  - monitor labs/ stable d  -hematology is following    #Neck pain/ Cervical C45 spine stenosis   - Neurontin trial and recent oral steroid trial with good clinical outcome   - Tylenol PRN, oxycodone prn with bowel regimen. Warm compress to neck ok. Soft discontinued.  -  CT C-spine- severe C45 stenosis.  - Neurosurgical follow as outpatient   - Orthopedic Surgery eval completed-medrol pack completed.  Currently pain and knee weakness improved. Participates in therapy.    #Urinary frequency  -improved   -Flomax added by  for BPH. PVRs are low.    GI/Bowel Mgmt - Senna,  Miralax, Dulcolax  /Bladder Mgmt - Voiding independently, PVRx1 and low      Precautions / PROPHYLAXIS:   - Falls, Cardiac, Seizure   - Ortho: Weight bearing status: WBAT   - Lungs: Aspiration, Incentive Spirometer   - Pressure injury/Skin: Turn Q2hrs while in bed, OOB to Chair, PT/OT    - DVT: Xarelto on hold        Spoke with patient's wife at length - detailed update given, sai questions answered to her satisfaction, discharge plan discussed.

## 2020-09-23 ENCOUNTER — TRANSCRIPTION ENCOUNTER (OUTPATIENT)
Age: 80
End: 2020-09-23

## 2020-09-23 LAB
ALBUMIN SERPL ELPH-MCNC: 2.5 G/DL — LOW (ref 3.3–5)
ALP SERPL-CCNC: 54 U/L — SIGNIFICANT CHANGE UP (ref 40–120)
ALT FLD-CCNC: 20 U/L — SIGNIFICANT CHANGE UP (ref 10–45)
ANION GAP SERPL CALC-SCNC: 6 MMOL/L — SIGNIFICANT CHANGE UP (ref 5–17)
AST SERPL-CCNC: 17 U/L — SIGNIFICANT CHANGE UP (ref 10–40)
BILIRUB SERPL-MCNC: 0.2 MG/DL — SIGNIFICANT CHANGE UP (ref 0.2–1.2)
BUN SERPL-MCNC: 33 MG/DL — HIGH (ref 7–23)
CALCIUM SERPL-MCNC: 9.2 MG/DL — SIGNIFICANT CHANGE UP (ref 8.4–10.5)
CHLORIDE SERPL-SCNC: 108 MMOL/L — SIGNIFICANT CHANGE UP (ref 96–108)
CO2 SERPL-SCNC: 29 MMOL/L — SIGNIFICANT CHANGE UP (ref 22–31)
CREAT SERPL-MCNC: 1.71 MG/DL — HIGH (ref 0.5–1.3)
GLUCOSE SERPL-MCNC: 92 MG/DL — SIGNIFICANT CHANGE UP (ref 70–99)
HCT VFR BLD CALC: 24.6 % — LOW (ref 39–50)
HGB BLD-MCNC: 8.5 G/DL — LOW (ref 13–17)
MCHC RBC-ENTMCNC: 34.1 PG — HIGH (ref 27–34)
MCHC RBC-ENTMCNC: 34.6 GM/DL — SIGNIFICANT CHANGE UP (ref 32–36)
MCV RBC AUTO: 98.8 FL — SIGNIFICANT CHANGE UP (ref 80–100)
NRBC # BLD: 0 /100 WBCS — SIGNIFICANT CHANGE UP (ref 0–0)
PLATELET # BLD AUTO: 153 K/UL — SIGNIFICANT CHANGE UP (ref 150–400)
POTASSIUM SERPL-MCNC: 4.1 MMOL/L — SIGNIFICANT CHANGE UP (ref 3.5–5.3)
POTASSIUM SERPL-SCNC: 4.1 MMOL/L — SIGNIFICANT CHANGE UP (ref 3.5–5.3)
PROT SERPL-MCNC: 5.6 G/DL — LOW (ref 6–8.3)
RBC # BLD: 2.49 M/UL — LOW (ref 4.2–5.8)
RBC # FLD: 15.3 % — HIGH (ref 10.3–14.5)
SODIUM SERPL-SCNC: 143 MMOL/L — SIGNIFICANT CHANGE UP (ref 135–145)
WBC # BLD: 4.5 K/UL — SIGNIFICANT CHANGE UP (ref 3.8–10.5)
WBC # FLD AUTO: 4.5 K/UL — SIGNIFICANT CHANGE UP (ref 3.8–10.5)

## 2020-09-23 PROCEDURE — 99232 SBSQ HOSP IP/OBS MODERATE 35: CPT

## 2020-09-23 RX ADMIN — GABAPENTIN 300 MILLIGRAM(S): 400 CAPSULE ORAL at 14:08

## 2020-09-23 RX ADMIN — Medication 650 MILLIGRAM(S): at 22:13

## 2020-09-23 RX ADMIN — Medication 81 MILLIGRAM(S): at 12:07

## 2020-09-23 RX ADMIN — SENNA PLUS 2 TABLET(S): 8.6 TABLET ORAL at 21:12

## 2020-09-23 RX ADMIN — TAMSULOSIN HYDROCHLORIDE 0.4 MILLIGRAM(S): 0.4 CAPSULE ORAL at 21:13

## 2020-09-23 RX ADMIN — GABAPENTIN 300 MILLIGRAM(S): 400 CAPSULE ORAL at 21:13

## 2020-09-23 RX ADMIN — RIVAROXABAN 15 MILLIGRAM(S): KIT at 17:23

## 2020-09-23 RX ADMIN — Medication 650 MILLIGRAM(S): at 21:13

## 2020-09-23 RX ADMIN — ATORVASTATIN CALCIUM 40 MILLIGRAM(S): 80 TABLET, FILM COATED ORAL at 21:13

## 2020-09-23 RX ADMIN — Medication 12.5 MILLIGRAM(S): at 17:23

## 2020-09-23 RX ADMIN — POLYETHYLENE GLYCOL 3350 17 GRAM(S): 17 POWDER, FOR SOLUTION ORAL at 06:10

## 2020-09-23 RX ADMIN — PANTOPRAZOLE SODIUM 40 MILLIGRAM(S): 20 TABLET, DELAYED RELEASE ORAL at 06:10

## 2020-09-23 RX ADMIN — Medication 1 MILLIGRAM(S): at 12:07

## 2020-09-23 RX ADMIN — Medication 325 MILLIGRAM(S): at 12:07

## 2020-09-23 RX ADMIN — Medication 100 MILLIGRAM(S): at 12:07

## 2020-09-23 RX ADMIN — GABAPENTIN 300 MILLIGRAM(S): 400 CAPSULE ORAL at 06:10

## 2020-09-23 NOTE — PROGRESS NOTE ADULT - SUBJECTIVE AND OBJECTIVE BOX
HPI:  80 yo male PMHx of HTN, CAD s/p stents x 3 (2005), CKD, Afib on Xarelto, s/p Medtronic PPM, myositis, presented to ED 8/30 with left weakness and neck pain. 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 Neurology and neurosurgery consulted. Per neuro evaluation, subcortical CVA suspected, ?right medullary/ pontine but this is uncertain/possibly due to small vessel disease versus cardioembolism related to atrial fibrillation. Unable to confirm with MRI- Pt with PPM that is not compatible with MRI. CTH repeated and stable. TTE EF 65%, no evidence of PFO. Pt with CT spine showing Multilevel cervical spondylosis with severe stenosis at C4-C5. Per Neurosurgery followup with Dr Douglas for aneurysm and C spine stenosis. No surgical intervention at this time. Evaluated by PMR and acute rehab recommended. (03 Sep 2020 15:20)    Subjective  Patient seen and examined at bedside. No acute overnight events. Denies complaints at time of evaluation    ALLERGIES:  No Known Allergies    MEDICATIONS  (STANDING):  allopurinol 100 milliGRAM(s) Oral daily  aspirin enteric coated 81 milliGRAM(s) Oral daily  atorvastatin 40 milliGRAM(s) Oral at bedtime  bisacodyl Suppository 10 milliGRAM(s) Rectal daily  ferrous    sulfate 325 milliGRAM(s) Oral daily  folic acid 1 milliGRAM(s) Oral daily  gabapentin 300 milliGRAM(s) Oral three times a day  influenza   Vaccine 0.5 milliLiter(s) IntraMuscular once  metoprolol tartrate 12.5 milliGRAM(s) Oral two times a day  pantoprazole    Tablet 40 milliGRAM(s) Oral before breakfast  polyethylene glycol 3350 17 Gram(s) Oral two times a day  rivaroxaban 15 milliGRAM(s) Oral with dinner  senna 2 Tablet(s) Oral at bedtime  tamsulosin 0.4 milliGRAM(s) Oral at bedtime  thiamine 100 milliGRAM(s) Oral daily    MEDICATIONS  (PRN):  acetaminophen   Tablet .. 650 milliGRAM(s) Oral every 6 hours PRN Temp greater or equal to 38C (100.4F), Mild Pain (1 - 3)  guaiFENesin   Syrup  (Sugar-Free) 100 milliGRAM(s) Oral every 6 hours PRN Cough  magnesium hydroxide Suspension 30 milliLiter(s) Oral once PRN Constipation  oxyCODONE    IR 5 milliGRAM(s) Oral every 8 hours PRN Severe Pain (7 - 10)    Vital Signs Last 24 Hrs  T(F): 97.7 (23 Sep 2020 07:50), Max: 98.7 (22 Sep 2020 19:47)  HR: 60 (23 Sep 2020 07:50) (60 - 88)  BP: 122/75 (23 Sep 2020 07:50) (112/70 - 138/80)  RR: 15 (23 Sep 2020 07:50) (15 - 15)  SpO2: 100% (23 Sep 2020 07:50) (99% - 100%)  I&O's Summary    22 Sep 2020 07:01  -  23 Sep 2020 07:00  --------------------------------------------------------  IN: 420 mL / OUT: 200 mL / NET: 220 mL    PHYSICAL EXAM:  General: Thin elderly M NAD, A/O x 3. Comfortable and cooperative  ENT: MMM, no tonsilar exudate  Neck: Supple, No JVD  Lungs: Clear to auscultation bilaterally, no wheezes. Good air entry bilaterally   Cardio: RRR, S1/S2, No murmurs  Abdomen: Soft, Nontender, Nondistended; Bowel sounds present  Extremities: No calf tenderness, No pitting edema    LABS:                        8.5    4.50  )-----------( 153      ( 23 Sep 2020 05:40 )             24.6       09-23    143  |  108  |  33  ----------------------------<  92  4.1   |  29  |  1.71    Ca    9.2      23 Sep 2020 05:40    TPro  5.6  /  Alb  2.5  /  TBili  0.2  /  DBili  x   /  AST  17  /  ALT  20  /  AlkPhos  54  09-23     eGFR if Non African American: 37 mL/min/1.73M2 (09-23-20 @ 05:40)  eGFR if African American: 43 mL/min/1.73M2 (09-23-20 @ 05:40)    08-31 Chol 152 mg/dL LDL 85 mg/dL HDL 48 mg/dL Trig 156 mg/dL    RADIOLOGY & ADDITIONAL TESTS: EKG, CXR    Care Discussed with Consultants/Other Providers: Yes

## 2020-09-23 NOTE — DISCHARGE NOTE NURSING/CASE MANAGEMENT/SOCIAL WORK - NSSCTYPOFSERV_GEN_ALL_CORE
[de-identified] : Sven bites his nails and fingers. His left thumb nail is erythematous around the nail bed RN, HHA assessment, OT, PT, speech : Note if you do not hear from them within 24 hours of discharge, please reach out to them directly.

## 2020-09-23 NOTE — DISCHARGE NOTE NURSING/CASE MANAGEMENT/SOCIAL WORK - NSDCPEPTSTRK_GEN_ALL_CORE
Need for follow up after discharge/Prescribed medications/Stroke support groups for patients, families, and friends/Stroke warning signs and symptoms/Signs and symptoms of stroke/Risk factors for stroke/Stroke education booklet/Call 911 for stroke

## 2020-09-23 NOTE — PROGRESS NOTE ADULT - ASSESSMENT
EVERARDO FGIUEROA is a 80yo Male with possible right sided subcortical CVA with left sided weakness and with decreased functional mobility, gait instability and ADL impairments.    - COMORBIDITES/ACTIVE MEDICAL ISSUES     Gait Instability, ADL impairments and Functional impairments:  Comprehensive Rehab Program of PT/OT/SLP. Family training completed. D/c plan home on 9/24.    #CVA  - PT/OT/SLP  -Xarelto for Hx Afib restarted   - PPM interrogated.   - Lipitor for goal LDL < 70  - GI ppx with Protonix       #Hx  of Unruptured  ACOM aneurysm  -outpatient  follow up with Neurosurgery  -no headache, awake and alert, no new  complaints    #HTN  -Lopressor BID to 12.5mg as per hospitalist plan. Cardiology evaluation completed for hx recurrent pre-syncope. PPM interrogated.    #Anemia/thrombocytopenia  -anemia, PRBC 1unit on 9/18 as per heme/hospitalist  - monitor labs/ stable  -hematology is following    #Neck pain/ Cervical C45 spine stenosis   - Neurontin trial and recent oral steroid trial with good clinical outcome   - Tylenol PRN, oxycodone prn with bowel regimen. Warm compress to neck ok. Soft discontinued.  -  CT C-spine- severe C45 stenosis.  - Neurosurgical follow as outpatient   - Orthopedic Surgery eval completed-medrol pack completed.  Currently pain and knee weakness improved. Participates in therapy.    #Urinary frequency  -improved   -Flomax added by  for BPH. PVRs are low.    GI/Bowel Mgmt - Senna,  Miralax, Dulcolax  /Bladder Mgmt - Voiding independently, PVRx1 and low      Precautions / PROPHYLAXIS:   - Falls, Cardiac, Seizure   - Ortho: Weight bearing status: WBAT   - Lungs: Aspiration, Incentive Spirometer   - Pressure injury/Skin: Turn Q2hrs while in bed, OOB to Chair, PT/OT    - DVT: Xarelto on hold

## 2020-09-23 NOTE — PROGRESS NOTE ADULT - ATTENDING COMMENTS
Patient medically and neurologically stable. Making progress towards rehab goals.   Continue rehab program. Anticipate discharge home in the morning.

## 2020-09-23 NOTE — PROGRESS NOTE ADULT - ASSESSMENT
This is a 81 y/o M PMH HTN, CAD, CKD, atrial fibrillation on Xarelto, who presented to the emergency department 8/30/20 with left-sided weakness and neck pain. 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 Patient is now on the acute rehabilitation unit in [facility].     #CVA   - PT/OT per rehab  - C/w ASA, Statin    #Anemia (acute on chronic)  - HD stable, requiring pRBC 9/4 and 9/18  - EGD/Colonoscopy - no significant findings, no bleeding noted. f/u with private GI as outpatient  - Most recent Hg 8.5  - c/w Iron    #Paroxysmal Atrial Fibrillation  - Cardiology eval noted  - BB with holding parameters  - Xarelto    #CKD 3  - monitor  - stable     #Febrile 9/8  - s/p 5 day course of Abx, completed 9/13    #Neck pain with cervical stenosis, chronic  - Gabapentin  - s/p completion of medrol dose pack 9/16    #Severe protein-calorie malnutrition  - Ensure BID  - Dietary recommendations noted    DVT/GI Prophylaxis:  - Xarelto  - Protonix

## 2020-09-23 NOTE — PROGRESS NOTE ADULT - SUBJECTIVE AND OBJECTIVE BOX
CHIEF COMPLAINT: Improved pain, improved balance, improved endurance. Excellent spirits.       HISTORY OF PRESENT ILLNESS  80 yo male PMHx of HTN, CAD s/p stents x 3 (2005), CKD, Afib on Xarelto, s/p Medtronic PPM, myositis, presented to ED 8/30 with left weakness and neck pain. 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 Neurology and neurosurgery consulted. Per neuro evaluation, subcortical CVA suspected, ?right medullary/ pontine but this is uncertain/possibly due to small vessel disease versus cardioembolism related to atrial fibrillation. Unable to confirm with MRI- Pt with PPM that is not compatible with MRI. CTH repeated and stable. TTE EF 65%, no evidence of PFO. Pt with CT spine showing Multilevel cervical spondylosis with severe stenosis at C4-C5. Per Neurosurgery followup with Dr Douglas for aneurysm and C spine stenosis. No surgical intervention at this time. Evaluated by PMR and acute rehab recommended. (03 Sep 2020 15:20)      PAST MEDICAL & SURGICAL HISTORY:  Myositis    Hyperlipidemia    Hypertension    GI bleed    Diverticulitis    No significant past surgical history      VITALS  Vital Signs Last 24 Hrs  T(C): 36.5 (23 Sep 2020 07:50), Max: 37.1 (22 Sep 2020 19:47)  T(F): 97.7 (23 Sep 2020 07:50), Max: 98.7 (22 Sep 2020 19:47)  HR: 60 (23 Sep 2020 07:50) (60 - 88)  BP: 122/75 (23 Sep 2020 07:50) (112/70 - 138/80)  BP(mean): --  RR: 15 (23 Sep 2020 07:50) (15 - 15)  SpO2: 100% (23 Sep 2020 07:50) (99% - 100%)      PHYSICAL EXAM  Constitutional - In bed, NAD  HEENT - EOMI  Neck - Supple  Chest - CTA bilaterally  Cardiovascular - RR  Abdomen - BS+, Soft, NTND  Extremities - No C/C/E, No calf tenderness   Skin-no rash      Neurologic Exam - Awake, Alert, without new deficits.     Balance - impaired     Psychiatric - Mood stable, Affect WNL       FUNCTIONAL PROGRESS  Gait - 100ft RW CG  ADLs - CG A  Transfers - CG  Functional transfer - CG    RECENT LABS                        8.5    4.50  )-----------( 153      ( 23 Sep 2020 05:40 )             24.6     09-23    143  |  108  |  33<H>  ----------------------------<  92  4.1   |  29  |  1.71<H>    Ca    9.2      23 Sep 2020 05:40    TPro  5.6<L>  /  Alb  2.5<L>  /  TBili  0.2  /  DBili  x   /  AST  17  /  ALT  20  /  AlkPhos  54  09-23      LIVER FUNCTIONS - ( 23 Sep 2020 05:40 )  Alb: 2.5 g/dL / Pro: 5.6 g/dL / ALK PHOS: 54 U/L / ALT: 20 U/L / AST: 17 U/L / GGT: x             Direct LDL: 85 mg/dL (08-31-20 @ 06:52)                RADIOLOGY/OTHER RESULTS      CURRENT MEDICATIONS  MEDICATIONS  (STANDING):  allopurinol 100 milliGRAM(s) Oral daily  aspirin enteric coated 81 milliGRAM(s) Oral daily  atorvastatin 40 milliGRAM(s) Oral at bedtime  bisacodyl Suppository 10 milliGRAM(s) Rectal daily  ferrous    sulfate 325 milliGRAM(s) Oral daily  folic acid 1 milliGRAM(s) Oral daily  gabapentin 300 milliGRAM(s) Oral three times a day  influenza   Vaccine 0.5 milliLiter(s) IntraMuscular once  metoprolol tartrate 12.5 milliGRAM(s) Oral two times a day  pantoprazole    Tablet 40 milliGRAM(s) Oral before breakfast  polyethylene glycol 3350 17 Gram(s) Oral two times a day  rivaroxaban 15 milliGRAM(s) Oral with dinner  senna 2 Tablet(s) Oral at bedtime  tamsulosin 0.4 milliGRAM(s) Oral at bedtime  thiamine 100 milliGRAM(s) Oral daily    MEDICATIONS  (PRN):  acetaminophen   Tablet .. 650 milliGRAM(s) Oral every 6 hours PRN Temp greater or equal to 38C (100.4F), Mild Pain (1 - 3)  guaiFENesin   Syrup  (Sugar-Free) 100 milliGRAM(s) Oral every 6 hours PRN Cough  magnesium hydroxide Suspension 30 milliLiter(s) Oral once PRN Constipation  oxyCODONE    IR 5 milliGRAM(s) Oral every 8 hours PRN Severe Pain (7 - 10)      ASSESSMENT & PLAN          GI/Bowel Management - senna   Management - Toilet Q2  Skin - Turn Q2  Pain - Tylenol PRN  DVT PPX - Xarelto  Diet - reg    Continue comprehensive acute rehab program consisting of 3hrs/day of OT/PT and SLP.

## 2020-09-23 NOTE — DISCHARGE NOTE NURSING/CASE MANAGEMENT/SOCIAL WORK - PATIENT PORTAL LINK FT
You can access the FollowMyHealth Patient Portal offered by [facility] by registering at the following website: [email or web address]. By joining [employer]’s FollowMyHealth portal, you will also be able to view your health information using other applications (apps) compatible with our system.

## 2020-09-24 ENCOUNTER — TRANSCRIPTION ENCOUNTER (OUTPATIENT)
Age: 80
End: 2020-09-24

## 2020-09-24 VITALS
DIASTOLIC BLOOD PRESSURE: 76 MMHG | RESPIRATION RATE: 15 BRPM | TEMPERATURE: 98 F | HEART RATE: 63 BPM | SYSTOLIC BLOOD PRESSURE: 135 MMHG | OXYGEN SATURATION: 99 %

## 2020-09-24 PROCEDURE — 97116 GAIT TRAINING THERAPY: CPT

## 2020-09-24 PROCEDURE — 87040 BLOOD CULTURE FOR BACTERIA: CPT

## 2020-09-24 PROCEDURE — 85025 COMPLETE CBC W/AUTO DIFF WBC: CPT

## 2020-09-24 PROCEDURE — 36415 COLL VENOUS BLD VENIPUNCTURE: CPT

## 2020-09-24 PROCEDURE — 83605 ASSAY OF LACTIC ACID: CPT

## 2020-09-24 PROCEDURE — 85014 HEMATOCRIT: CPT

## 2020-09-24 PROCEDURE — 85379 FIBRIN DEGRADATION QUANT: CPT

## 2020-09-24 PROCEDURE — 99239 HOSP IP/OBS DSCHRG MGMT >30: CPT

## 2020-09-24 PROCEDURE — 87086 URINE CULTURE/COLONY COUNT: CPT

## 2020-09-24 PROCEDURE — 82747 ASSAY OF FOLIC ACID RBC: CPT

## 2020-09-24 PROCEDURE — 84145 PROCALCITONIN (PCT): CPT

## 2020-09-24 PROCEDURE — 86923 COMPATIBILITY TEST ELECTRIC: CPT

## 2020-09-24 PROCEDURE — 71045 X-RAY EXAM CHEST 1 VIEW: CPT

## 2020-09-24 PROCEDURE — 82728 ASSAY OF FERRITIN: CPT

## 2020-09-24 PROCEDURE — 86850 RBC ANTIBODY SCREEN: CPT

## 2020-09-24 PROCEDURE — 80053 COMPREHEN METABOLIC PANEL: CPT

## 2020-09-24 PROCEDURE — 93005 ELECTROCARDIOGRAM TRACING: CPT

## 2020-09-24 PROCEDURE — 92610 EVALUATE SWALLOWING FUNCTION: CPT

## 2020-09-24 PROCEDURE — 97110 THERAPEUTIC EXERCISES: CPT

## 2020-09-24 PROCEDURE — 97167 OT EVAL HIGH COMPLEX 60 MIN: CPT

## 2020-09-24 PROCEDURE — 74176 CT ABD & PELVIS W/O CONTRAST: CPT

## 2020-09-24 PROCEDURE — 85045 AUTOMATED RETICULOCYTE COUNT: CPT

## 2020-09-24 PROCEDURE — 85018 HEMOGLOBIN: CPT

## 2020-09-24 PROCEDURE — 85027 COMPLETE CBC AUTOMATED: CPT

## 2020-09-24 PROCEDURE — 90686 IIV4 VACC NO PRSV 0.5 ML IM: CPT

## 2020-09-24 PROCEDURE — 84100 ASSAY OF PHOSPHORUS: CPT

## 2020-09-24 PROCEDURE — U0003: CPT

## 2020-09-24 PROCEDURE — 73502 X-RAY EXAM HIP UNI 2-3 VIEWS: CPT

## 2020-09-24 PROCEDURE — 80048 BASIC METABOLIC PNL TOTAL CA: CPT

## 2020-09-24 PROCEDURE — 84484 ASSAY OF TROPONIN QUANT: CPT

## 2020-09-24 PROCEDURE — 97112 NEUROMUSCULAR REEDUCATION: CPT

## 2020-09-24 PROCEDURE — 36430 TRANSFUSION BLD/BLD COMPNT: CPT

## 2020-09-24 PROCEDURE — P9016: CPT

## 2020-09-24 PROCEDURE — 92523 SPEECH SOUND LANG COMPREHEN: CPT

## 2020-09-24 PROCEDURE — 97530 THERAPEUTIC ACTIVITIES: CPT

## 2020-09-24 PROCEDURE — 72125 CT NECK SPINE W/O DYE: CPT

## 2020-09-24 PROCEDURE — 97163 PT EVAL HIGH COMPLEX 45 MIN: CPT

## 2020-09-24 PROCEDURE — 81001 URINALYSIS AUTO W/SCOPE: CPT

## 2020-09-24 PROCEDURE — 83521 IG LIGHT CHAINS FREE EACH: CPT

## 2020-09-24 PROCEDURE — 92507 TX SP LANG VOICE COMM INDIV: CPT

## 2020-09-24 PROCEDURE — 84165 PROTEIN E-PHORESIS SERUM: CPT

## 2020-09-24 PROCEDURE — 82272 OCCULT BLD FECES 1-3 TESTS: CPT

## 2020-09-24 PROCEDURE — 97535 SELF CARE MNGMENT TRAINING: CPT

## 2020-09-24 PROCEDURE — 82962 GLUCOSE BLOOD TEST: CPT

## 2020-09-24 PROCEDURE — 86901 BLOOD TYPING SEROLOGIC RH(D): CPT

## 2020-09-24 PROCEDURE — 86900 BLOOD TYPING SEROLOGIC ABO: CPT

## 2020-09-24 PROCEDURE — 93306 TTE W/DOPPLER COMPLETE: CPT

## 2020-09-24 RX ORDER — ATORVASTATIN CALCIUM 80 MG/1
1 TABLET, FILM COATED ORAL
Qty: 30 | Refills: 0
Start: 2020-09-24 | End: 2020-10-23

## 2020-09-24 RX ORDER — ALLOPURINOL 300 MG
1 TABLET ORAL
Qty: 0 | Refills: 0 | DISCHARGE

## 2020-09-24 RX ORDER — PANTOPRAZOLE SODIUM 20 MG/1
1 TABLET, DELAYED RELEASE ORAL
Qty: 30 | Refills: 0
Start: 2020-09-24 | End: 2020-10-23

## 2020-09-24 RX ORDER — THIAMINE MONONITRATE (VIT B1) 100 MG
1 TABLET ORAL
Qty: 0 | Refills: 0 | DISCHARGE
Start: 2020-09-24

## 2020-09-24 RX ORDER — FOLIC ACID 0.8 MG
1 TABLET ORAL
Qty: 0 | Refills: 0 | DISCHARGE
Start: 2020-09-24

## 2020-09-24 RX ORDER — FERROUS SULFATE 325(65) MG
1 TABLET ORAL
Qty: 0 | Refills: 0 | DISCHARGE
Start: 2020-09-24

## 2020-09-24 RX ORDER — ASPIRIN/CALCIUM CARB/MAGNESIUM 324 MG
1 TABLET ORAL
Qty: 0 | Refills: 0 | DISCHARGE
Start: 2020-09-24

## 2020-09-24 RX ORDER — ACETAMINOPHEN 500 MG
2 TABLET ORAL
Qty: 0 | Refills: 0 | DISCHARGE
Start: 2020-09-24

## 2020-09-24 RX ORDER — PANTOPRAZOLE SODIUM 20 MG/1
1 TABLET, DELAYED RELEASE ORAL
Qty: 0 | Refills: 0 | DISCHARGE

## 2020-09-24 RX ORDER — TAMSULOSIN HYDROCHLORIDE 0.4 MG/1
1 CAPSULE ORAL
Qty: 30 | Refills: 0
Start: 2020-09-24 | End: 2020-10-23

## 2020-09-24 RX ORDER — SENNA PLUS 8.6 MG/1
2 TABLET ORAL
Qty: 0 | Refills: 0 | DISCHARGE
Start: 2020-09-24

## 2020-09-24 RX ORDER — ASPIRIN/CALCIUM CARB/MAGNESIUM 324 MG
1 TABLET ORAL
Qty: 0 | Refills: 0 | DISCHARGE

## 2020-09-24 RX ORDER — ALLOPURINOL 300 MG
1 TABLET ORAL
Qty: 30 | Refills: 0
Start: 2020-09-24 | End: 2020-10-23

## 2020-09-24 RX ORDER — FONDAPARINUX SODIUM 2.5 MG/.5ML
1 INJECTION, SOLUTION SUBCUTANEOUS
Qty: 0 | Refills: 0 | DISCHARGE

## 2020-09-24 RX ORDER — RIVAROXABAN 15 MG-20MG
1 KIT ORAL
Qty: 30 | Refills: 0
Start: 2020-09-24 | End: 2020-10-23

## 2020-09-24 RX ORDER — GABAPENTIN 400 MG/1
1 CAPSULE ORAL
Qty: 90 | Refills: 0
Start: 2020-09-24 | End: 2020-10-23

## 2020-09-24 RX ORDER — METOPROLOL TARTRATE 50 MG
0.5 TABLET ORAL
Qty: 30 | Refills: 0
Start: 2020-09-24 | End: 2020-10-23

## 2020-09-24 RX ADMIN — INFLUENZA VIRUS VACCINE 0.5 MILLILITER(S): 15; 15; 15; 15 SUSPENSION INTRAMUSCULAR at 11:14

## 2020-09-24 RX ADMIN — Medication 100 MILLIGRAM(S): at 11:06

## 2020-09-24 RX ADMIN — GABAPENTIN 300 MILLIGRAM(S): 400 CAPSULE ORAL at 06:23

## 2020-09-24 RX ADMIN — Medication 12.5 MILLIGRAM(S): at 06:23

## 2020-09-24 RX ADMIN — Medication 81 MILLIGRAM(S): at 11:06

## 2020-09-24 RX ADMIN — Medication 1 MILLIGRAM(S): at 11:06

## 2020-09-24 RX ADMIN — Medication 325 MILLIGRAM(S): at 11:06

## 2020-09-24 RX ADMIN — PANTOPRAZOLE SODIUM 40 MILLIGRAM(S): 20 TABLET, DELAYED RELEASE ORAL at 06:23

## 2020-09-24 NOTE — DISCHARGE NOTE PROVIDER - PROVIDER RX CONTACT NUMBER
Abe is a 19-month old female with infant +MLL-rearranged B-Cell ALL, s/p UCART therapy at [facility] for relapsed disease, who is now day +48 (10/8) from matched sibling BMT on 8/22/19. 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 She is persistently + Coronavirus and is now +R/E. She was transferred to the PICU on 9/21-9/24 for increased work of breathing but has since been stable on RA.     Continues to have pruritic rash on face, however resolution of rash on back/flank/abdomen with methylprednisolone. Increased potency of hydrocortisone for face yesterday and continue use of Aquaphor as well as Hydroxyzine, for pruritus. Continues to require significant diuresis to help with positive fluid balance. She is s/p successful  balloon angioplasty of LIJ and brachiocephalic (and replacment of SLM) in hopes of reducing effects of SVC syndrome. She tolerated the procedure well. Lovenox re-started after confirmation left leg swelling not secondary to bleeding. Currently on Imodium BID, will continue for now. Though her overall stool quantity has decreased, she continues to have loose episodes during the day.     FISH came back with 100% donor cells. IGG level 886, therefore IVIG not needed. Received Pentamidine on 10/04/19. Abe is a 19-month old female with infant +MLL-rearranged B-Cell ALL, s/p UCART therapy at [facility] for relapsed disease, who is now day +48 (10/9) from matched sibling BMT on 8/22/19. 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 She is persistently + Coronavirus and is now +R/E. She was transferred to the PICU on 9/21-9/24 for increased work of breathing but has since been stable on RA.     Continues to have pruritic rash on face, however resolution of rash on back/flank/abdomen with methylprednisolone. Increased potency of hydrocortisone for face yesterday and continue use of Aquaphor as well as Hydroxyzine, for pruritus. Continues to require significant diuresis to help with positive fluid balance. She is s/p successful  balloon angioplasty of LIJ and brachiocephalic (and replacment of SLM) in hopes of reducing effects of SVC syndrome. She tolerated the procedure well. Lovenox re-started after confirmation left leg swelling not secondary to bleeding. Currently on Imodium BID, will continue for now. Though her overall stool quantity has decreased, she continues to have loose episodes during the day.   She has been tolerating her feeds at 35, which on their own do not provide her enough kcal so will begin to slowly increase with the goal to take off the TPN.    FISH came back with 100% donor cells. IGG level 886, therefore IVIG not needed. Received Pentamidine on 10/04/19. [phone]

## 2020-09-24 NOTE — DISCHARGE NOTE PROVIDER - HOSPITAL COURSE
78 yo male PMHx of HTN, CAD s/p stents x 3 (2005), CKD, Afib on Xarelto, s/p Medtronic PPM, myositis, presented to ED 8/30 with left weakness and neck pain. 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 Neurology and neurosurgery consulted. Per neuro evaluation, subcortical CVA suspected, ?right medullary/ pontine but this is uncertain/possibly due to small vessel disease versus cardioembolism related to atrial fibrillation. Unable to confirm with MRI- Pt with PPM that is not compatible with MRI. CTH repeated and stable. TTE EF 65%, no evidence of PFO. Pt with CT spine showing Multilevel cervical spondylosis with severe stenosis at C4-C5. Per Neurosurgery followup with Dr Douglas for aneurysm and C spine stenosis. No surgical intervention at this time. Evaluated by PMR and acute rehab recommended.   At [facility] rehab patient completed comprehensive PT/OT/SLP program and performs activities with CG/supervision. Able to ambulate over 100ft with supervision/CG. ADLs supervision. While at rehab patient evaluated by medicine, urology, hematology, and cardiology. PPM interrogated for episodes of pre-syncope. While at [facility] patient received PRBC 1U transfusion x2 as per hematology plan. Severe anemia while on xarelto/aspirin evaluated by GI and patient underwent EGD/colo with Dr britt, danyell endo outpt follow up recommended. BP regimen adjusted. iron supplementation with bowel regimen. Medrol started as per orthopedic surgery for neck pain-CT showed severe spinal stenosis as prior. Fever evaluated by ID, completed antibiotics, BC negative. Flomax added by . patient cleared for d/c home with HC on 9/24.

## 2020-09-24 NOTE — DISCHARGE NOTE PROVIDER - NSDCACTIVITY_GEN_ALL_CORE
Do not make important decisions/Walking - Indoors allowed/No heavy lifting/straining/Stairs allowed/Sex allowed/Showering allowed/Walking - Outdoors allowed/Do not drive or operate machinery

## 2020-09-24 NOTE — DISCHARGE NOTE PROVIDER - CARE PROVIDERS DIRECT ADDRESSES
,[email or web address]_Unknown,[email or web address],DirectAddress_Unknown,DirectAddress_Unknown,DirectAddress_Unknown

## 2020-09-24 NOTE — DISCHARGE NOTE PROVIDER - CARE PROVIDER_API CALL
Dom Hay  NEUROSURGERY  [address], [address]  [place] [address]  Phone: [phone]  Fax: [phone]  Follow Up Time:     JACOB BEASLEY  Gastroenterology  [address], [address]  [place] [address]  Phone: [phone]  Fax: [phone]  Follow Up Time:     Beth Putnam  INTERNAL MEDICINE  [address], [place] [address]  Phone: [phone]  Fax: [phone]  Follow Up Time:     Yon Carlson (MD)  Internal Medicine; Nephrology  [address], [address]  [place] [address]  Phone: [phone]  Fax: [phone]  Follow Up Time:     Samia Mcelroy (NP; RN)  NP in Family Health  [address], [address]  [place] [address]  Phone: [phone]  Fax: [phone]  Follow Up Time:     Mike Douglas; MS)  Unallocated  [address] [place] [address]  Phone: [phone]  Fax: [phone]  Follow Up Time:

## 2020-09-24 NOTE — PROGRESS NOTE ADULT - ATTENDING COMMENTS
Excellent neurological and afunctional progress to date.   Patient is being discharged home with home care.  Discharge instructions were discussed with patient and family, all current medications were sent to the pharmacy. Patient and family were educated on importance of medication compliance,  continued  care with PMD and follow-up care with the specialists in the community. Safety and fall risk precautions  were discussed in detail, counseled on healthy life style modifications.  All questions were answered to their satisfaction.

## 2020-09-24 NOTE — PROGRESS NOTE ADULT - PROBLEM SELECTOR PLAN 1
Anemia-chronic kidney disease likely contributing. Patient with history of GI bleeding as well-guaiac + stool. No gross bleeding noted clinically at present. As this patient continues with anticoagulation, continue to monitor CBC. Transfusional support as needed (for hemoglobin less than 8/related symptoms). GI F/U planned.
better with flomax, will stop bladder scans
- EGD & Colonoscopy planned for Monday  - bowel preparation orders will be written tomorrow  - anticoagulation on hold
Anemia-chronic kidney disease likely contributing. Patient with history of GI bleeding as well-guaiac + stool noted. As this patient continues with anticoagulation, continue to monitor CBC. Hemoglobin currently stable. Transfusional support as needed (for hemoglobin less than 8/related symptoms). GI F/U.
Anemia-chronic kidney disease likely contributing. Patient with history of GI bleeding as well-guaiac + stool. No gross bleeding noted clinically at present. Agree with plans for transfusion. F/U CBC post PRBC. GI F/U planned.
Anemia-chronic kidney disease likely contributing. Patient with history of GI bleeding as well-guaiac + stool. No gross bleeding noted clinically at present. As this patient continues with anticoagulation, continue to monitor CBC. Transfusional support as needed (for hemoglobin less than 8/related symptoms). GI F/U.
Anemia-chronic kidney disease likely contributing. Patient with history of GI bleeding as well-guaiac stool, as this patient continues with anticoagulation. Continue to monitor CBC. Transfusional support as needed (for hemoglobin less than 8/related symptoms).
continue flomax, monitor voiding
flomax, follow-up urologist Dr. Lilly after discharge
flomax, urology f/u Dr. Lilly
improved with flomax, no side effects, discussed delayed follow-up with Dr. Lilly

## 2020-09-24 NOTE — PROGRESS NOTE ADULT - ASSESSMENT
EVERARDO FIGUEROA is a 78yo Male with possible right sided subcortical CVA with left sided weakness and with decreased functional mobility, gait instability and ADL impairments.    - COMORBIDITES/ACTIVE MEDICAL ISSUES     Gait Instability, ADL impairments and Functional impairments:  Completed Comprehensive Rehab Program of PT/OT/SLP. Family training completed. D/c plan home on 9/24.    #CVA  - PT/OT/SLP completed  -Xarelto for Hx Afib restarted   - PPM interrogated.   - Lipitor for goal LDL < 70  - GI ppx with Protonix       #Hx  of Unruptured  ACOM aneurysm  -outpatient  follow up with Neurosurgery  -no headache, awake and alert, no new  complaints    #HTN  -Lopressor BID to 12.5mg as per hospitalist plan. Cardiology evaluation completed for hx recurrent pre-syncope. PPM interrogated.    #Anemia/thrombocytopenia  -anemia, PRBC 1unit on 9/18 as per heme/hospitalist  - monitor labs/ stable  -hematology is following    #Neck pain/ Cervical C45 spine stenosis   - Neurontin trial and recent oral steroid trial with good clinical outcome   - Tylenol PRN, oxycodone prn with bowel regimen. Warm compress to neck ok. Soft discontinued.  -  CT C-spine- severe C45 stenosis.  - Neurosurgical follow as outpatient   - Orthopedic Surgery eval completed-medrol pack completed.  Currently pain and knee weakness improved. Participates in therapy.    #Urinary frequency  -improved   -Flomax added by  for BPH. PVRs are low.    GI/Bowel Mgmt - Senna,  Miralax, Dulcolax  /Bladder Mgmt - Voiding independently, PVRx1 and low

## 2020-09-24 NOTE — DISCHARGE NOTE PROVIDER - NSDCMRMEDTOKEN_GEN_ALL_CORE_FT
acetaminophen 325 mg oral tablet: 2 tab(s) orally every 6 hours, As needed, Temp greater or equal to 38C (100.4F), Mild Pain (1 - 3)  allopurinol 100 mg oral tablet: 1 tab(s) orally once a day  aspirin 81 mg oral delayed release tablet: 1 tab(s) orally once a day  atorvastatin 40 mg oral tablet: 1 tab(s) orally once a day (at bedtime)  ferrous sulfate 325 mg (65 mg elemental iron) oral tablet: 1 tab(s) orally once a day  folic acid 1 mg oral tablet: 1 tab(s) orally once a day  gabapentin 300 mg oral capsule: 1 cap(s) orally 3 times a day  metoprolol tartrate 25 mg oral tablet: 0.5 tab(s) orally 2 times a day   Multiple Vitamins oral tablet: 1 tab(s) orally once a day  pantoprazole 40 mg oral delayed release tablet: 1 tab(s) orally once a day (before a meal)  polyethylene glycol 3350 oral powder for reconstitution: 17 gram(s) orally once a day, As needed, Constipation  rivaroxaban 15 mg oral tablet: 1 tab(s) orally once a day (before a meal)  senna oral tablet: 2 tab(s) orally once a day (at bedtime)  tamsulosin 0.4 mg oral capsule: 1 cap(s) orally once a day (at bedtime)  thiamine 100 mg oral tablet: 1 tab(s) orally once a day

## 2020-09-24 NOTE — PROGRESS NOTE ADULT - PROVIDER SPECIALTY LIST ADULT
Gastroenterology
Heme/Onc
Hospitalist
Infectious Disease
Neurology
Orthopedics
Physiatry
Rehab Medicine
Rehab Medicine
Urology
Hospitalist
Neurology
Neurology
Urology
Heme/Onc

## 2020-09-24 NOTE — PROGRESS NOTE ADULT - SUBJECTIVE AND OBJECTIVE BOX
CHIEF COMPLAINT: Offers no complaints, no new symptoms overnight.       HISTORY OF PRESENT ILLNESS  78 yo male PMHx of HTN, CAD s/p stents x 3 (2005), CKD, Afib on Xarelto, s/p Medtronic PPM, myositis, presented to ED 8/30 with left weakness and neck pain. 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 Neurology and neurosurgery consulted. Per neuro evaluation, subcortical CVA suspected, ?right medullary/ pontine but this is uncertain/possibly due to small vessel disease versus cardioembolism related to atrial fibrillation. Unable to confirm with MRI- Pt with PPM that is not compatible with MRI. CTH repeated and stable. TTE EF 65%, no evidence of PFO. Pt with CT spine showing Multilevel cervical spondylosis with severe stenosis at C4-C5. Per Neurosurgery followup with Dr Douglas for aneurysm and C spine stenosis. No surgical intervention at this time. Evaluated by PMR and acute rehab recommended. (03 Sep 2020 15:20)      PAST MEDICAL & SURGICAL HISTORY:  Myositis    Hyperlipidemia    Hypertension    GI bleed    Diverticulitis    No significant past surgical history      VITALS  Vital Signs Last 24 Hrs  T(C): 36.4 (24 Sep 2020 07:46), Max: 36.8 (23 Sep 2020 20:22)  T(F): 97.6 (24 Sep 2020 07:46), Max: 98.3 (23 Sep 2020 20:22)  HR: 63 (24 Sep 2020 07:46) (63 - 80)  BP: 135/76 (24 Sep 2020 07:46) (119/69 - 136/76)  BP(mean): --  RR: 15 (24 Sep 2020 07:46) (15 - 15)  SpO2: 99% (24 Sep 2020 07:46) (96% - 99%)    PHYSICAL EXAM  Constitutional - In bed, NAD  HEENT - EOMI  Neck - Supple  Chest - CTA bilaterally  Cardiovascular - RR  Abdomen - BS+, Soft, NTND  Extremities - No C/C/E, No calf tenderness   Skin-no rash      Neurologic Exam - Awake, Alert, without new deficits.     Balance - impaired     Psychiatric - Mood stable, Affect WNL       FUNCTIONAL PROGRESS  Gait - 100ft RW CG  ADLs - CG A  Transfers - CG  Functional transfer - CG    RECENT LABS                        8.5    4.50  )-----------( 153      ( 23 Sep 2020 05:40 )             24.6     09-23    143  |  108  |  33<H>  ----------------------------<  92  4.1   |  29  |  1.71<H>    Ca    9.2      23 Sep 2020 05:40    TPro  5.6<L>  /  Alb  2.5<L>  /  TBili  0.2  /  DBili  x   /  AST  17  /  ALT  20  /  AlkPhos  54  09-23      LIVER FUNCTIONS - ( 23 Sep 2020 05:40 )  Alb: 2.5 g/dL / Pro: 5.6 g/dL / ALK PHOS: 54 U/L / ALT: 20 U/L / AST: 17 U/L / GGT: x             Direct LDL: 85 mg/dL (08-31-20 @ 06:52)                RADIOLOGY/OTHER RESULTS      CURRENT MEDICATIONS  MEDICATIONS  (STANDING):  allopurinol 100 milliGRAM(s) Oral daily  aspirin enteric coated 81 milliGRAM(s) Oral daily  atorvastatin 40 milliGRAM(s) Oral at bedtime  bisacodyl Suppository 10 milliGRAM(s) Rectal daily  ferrous    sulfate 325 milliGRAM(s) Oral daily  folic acid 1 milliGRAM(s) Oral daily  gabapentin 300 milliGRAM(s) Oral three times a day  influenza   Vaccine 0.5 milliLiter(s) IntraMuscular once  metoprolol tartrate 12.5 milliGRAM(s) Oral two times a day  pantoprazole    Tablet 40 milliGRAM(s) Oral before breakfast  polyethylene glycol 3350 17 Gram(s) Oral two times a day  rivaroxaban 15 milliGRAM(s) Oral with dinner  senna 2 Tablet(s) Oral at bedtime  tamsulosin 0.4 milliGRAM(s) Oral at bedtime  thiamine 100 milliGRAM(s) Oral daily    MEDICATIONS  (PRN):  acetaminophen   Tablet .. 650 milliGRAM(s) Oral every 6 hours PRN Temp greater or equal to 38C (100.4F), Mild Pain (1 - 3)  guaiFENesin   Syrup  (Sugar-Free) 100 milliGRAM(s) Oral every 6 hours PRN Cough  magnesium hydroxide Suspension 30 milliLiter(s) Oral once PRN Constipation      ASSESSMENT & PLAN    Completed comprehensive acute rehab program consisting of 3hrs/day of OT/PT and SLP. Discharge home today.

## 2020-09-24 NOTE — PROGRESS NOTE ADULT - NUTRITIONAL ASSESSMENT
This patient has been assessed with a concern for Malnutrition and has been determined to have a diagnosis/diagnoses of Severe protein-calorie malnutrition and Underweight/BMI < 19.    This patient is being managed with:   Diet DASH/TLC-  Sodium & Cholesterol Restricted  Supplement Feeding Modality:  Oral  Ensure Enlive Cans or Servings Per Day:  1       Frequency:  Two Times a day  Entered: Sep  4 2020  2:36PM
This patient has been assessed with a concern for Malnutrition and has been determined to have a diagnosis/diagnoses of Severe protein-calorie malnutrition and Underweight/BMI < 19.    This patient is being managed with:   Diet DASH/TLC-  Sodium & Cholesterol Restricted  Supplement Feeding Modality:  Oral  Ensure Enlive Cans or Servings Per Day:  1       Frequency:  Two Times a day  Entered: Sep 21 2020  1:29PM    
This patient has been assessed with a concern for Malnutrition and has been determined to have a diagnosis/diagnoses of Severe protein-calorie malnutrition and Underweight/BMI < 19.    This patient is being managed with:   Diet NPO after Midnight-     NPO Start Date: 20-Sep-2020   NPO Start Time: 23:59  Except Medications  With Ice Chips/Sips of Water  Entered: Sep 20 2020 12:26PM    Diet Clear Liquid-  Entered: Sep 20 2020  7:54AM    
This patient has been assessed with a concern for Malnutrition and has been determined to have a diagnosis/diagnoses of Severe protein-calorie malnutrition and Underweight/BMI < 19.    This patient is being managed with:   Diet DASH/TLC-  Sodium & Cholesterol Restricted  Supplement Feeding Modality:  Oral  Ensure Enlive Cans or Servings Per Day:  1       Frequency:  Two Times a day  Entered: Sep  4 2020  2:36PM
This patient has been assessed with a concern for Malnutrition and has been determined to have a diagnosis/diagnoses of Severe protein-calorie malnutrition and Underweight/BMI < 19.    This patient is being managed with:   Diet DASH/TLC-  Sodium & Cholesterol Restricted  Supplement Feeding Modality:  Oral  Ensure Enlive Cans or Servings Per Day:  1       Frequency:  Two Times a day  Entered: Sep 21 2020  1:29PM    
This patient has been assessed with a concern for Malnutrition and has been determined to have a diagnosis/diagnoses of Severe protein-calorie malnutrition and Underweight/BMI < 19.    This patient is being managed with:   Diet DASH/TLC-  Sodium & Cholesterol Restricted  Supplement Feeding Modality:  Oral  Ensure Enlive Cans or Servings Per Day:  1       Frequency:  Two Times a day  Entered: Sep 21 2020  1:29PM    
This patient has been assessed with a concern for Malnutrition and has been determined to have a diagnosis/diagnoses of Severe protein-calorie malnutrition and Underweight/BMI < 19.    This patient is being managed with:   Diet DASH/TLC-  Sodium & Cholesterol Restricted  Supplement Feeding Modality:  Oral  Ensure Enlive Cans or Servings Per Day:  1       Frequency:  Two Times a day  Entered: Sep  4 2020  2:36PM
This patient has been assessed with a concern for Malnutrition and has been determined to have a diagnosis/diagnoses of Severe protein-calorie malnutrition and Underweight/BMI < 19.    This patient is being managed with:   Diet DASH/TLC-  Sodium & Cholesterol Restricted  Supplement Feeding Modality:  Oral  Ensure Enlive Cans or Servings Per Day:  1       Frequency:  Two Times a day  Entered: Sep 21 2020  1:29PM    
This patient has been assessed with a concern for Malnutrition and has been determined to have a diagnosis/diagnoses of Severe protein-calorie malnutrition and Underweight/BMI < 19.    This patient is being managed with:   Diet DASH/TLC-  Sodium & Cholesterol Restricted  Supplement Feeding Modality:  Oral  Ensure Enlive Cans or Servings Per Day:  1       Frequency:  Two Times a day  Entered: Sep 21 2020  1:29PM    
This patient has been assessed with a concern for Malnutrition and has been determined to have a diagnosis/diagnoses of Severe protein-calorie malnutrition and Underweight/BMI < 19.    This patient is being managed with:   Diet NPO after Midnight-     NPO Start Date: 20-Sep-2020   NPO Start Time: 23:59  Entered: Sep 20 2020  8:19AM    Diet NPO after Midnight-     NPO Start Date: 20-Sep-2020   NPO Start Time: 23:59  Entered: Sep 20 2020  7:55AM    Diet Clear Liquid-  Entered: Sep 20 2020  7:54AM    
This patient has been assessed with a concern for Malnutrition and has been determined to have a diagnosis/diagnoses of Severe protein-calorie malnutrition and Underweight/BMI < 19.    This patient is being managed with:   Diet NPO after Midnight-     NPO Start Date: 20-Sep-2020   NPO Start Time: 23:59  Except Medications  With Ice Chips/Sips of Water  Entered: Sep 20 2020 12:26PM    Diet Clear Liquid-  Entered: Sep 20 2020  7:54AM    
This patient has been assessed with a concern for Malnutrition and has been determined to have a diagnosis/diagnoses of Severe protein-calorie malnutrition and Underweight/BMI < 19.    This patient is being managed with:   Diet DASH/TLC-  Sodium & Cholesterol Restricted  Supplement Feeding Modality:  Oral  Ensure Enlive Cans or Servings Per Day:  1       Frequency:  Two Times a day  Entered: Sep  4 2020  2:36PM    
This patient has been assessed with a concern for Malnutrition and has been determined to have a diagnosis/diagnoses of Severe protein-calorie malnutrition and Underweight/BMI < 19.    This patient is being managed with:   Diet DASH/TLC-  Sodium & Cholesterol Restricted  Supplement Feeding Modality:  Oral  Ensure Enlive Cans or Servings Per Day:  1       Frequency:  Two Times a day  Entered: Sep 21 2020  1:29PM    
This patient has been assessed with a concern for Malnutrition and has been determined to have a diagnosis/diagnoses of Severe protein-calorie malnutrition and Underweight/BMI < 19.    This patient is being managed with:   Diet DASH/TLC-  Sodium & Cholesterol Restricted  Supplement Feeding Modality:  Oral  Ensure Enlive Cans or Servings Per Day:  1       Frequency:  Two Times a day  Entered: Sep 21 2020  1:29PM    
This patient has been assessed with a concern for Malnutrition and has been determined to have a diagnosis/diagnoses of Severe protein-calorie malnutrition and Underweight/BMI < 19.    This patient is being managed with:   Diet NPO after Midnight-     NPO Start Date: 20-Sep-2020   NPO Start Time: 23:59  Except Medications  With Ice Chips/Sips of Water  Entered: Sep 20 2020 12:26PM    Diet Clear Liquid-  Entered: Sep 20 2020  7:54AM

## 2020-09-24 NOTE — PROGRESS NOTE ADULT - SUBJECTIVE AND OBJECTIVE BOX
Patient is a 80y old  Male who presents with a chief complaint of 1.2 s/p right sided cva with left sided weakness (23 Sep 2020 12:55)    HPI:  78 yo male PMHx of HTN, CAD s/p stents x 3 (2005), CKD, Afib on Xarelto, s/p Medtronic PPM, myositis, presented to ED 8/30 with left weakness and neck pain. 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 Neurology and neurosurgery consulted. Per neuro evaluation, subcortical CVA suspected, ?right medullary/ pontine but this is uncertain/possibly due to small vessel disease versus cardioembolism related to atrial fibrillation. Unable to confirm with MRI- Pt with PPM that is not compatible with MRI. CTH repeated and stable. TTE EF 65%, no evidence of PFO. Pt with CT spine showing Multilevel cervical spondylosis with severe stenosis at C4-C5. Per Neurosurgery followup with Dr Douglas for aneurysm and C spine stenosis. No surgical intervention at this time. Evaluated by PMR and acute rehab recommended. (03 Sep 2020 15:20)    stream good, no dysuria    Interval Events:  Patient seen and examined at bedside.    MEDICATIONS:  MEDICATIONS  (STANDING):  allopurinol 100 milliGRAM(s) Oral daily  aspirin enteric coated 81 milliGRAM(s) Oral daily  atorvastatin 40 milliGRAM(s) Oral at bedtime  bisacodyl Suppository 10 milliGRAM(s) Rectal daily  ferrous    sulfate 325 milliGRAM(s) Oral daily  folic acid 1 milliGRAM(s) Oral daily  gabapentin 300 milliGRAM(s) Oral three times a day  influenza   Vaccine 0.5 milliLiter(s) IntraMuscular once  metoprolol tartrate 12.5 milliGRAM(s) Oral two times a day  pantoprazole    Tablet 40 milliGRAM(s) Oral before breakfast  polyethylene glycol 3350 17 Gram(s) Oral two times a day  rivaroxaban 15 milliGRAM(s) Oral with dinner  senna 2 Tablet(s) Oral at bedtime  tamsulosin 0.4 milliGRAM(s) Oral at bedtime  thiamine 100 milliGRAM(s) Oral daily    MEDICATIONS  (PRN):  acetaminophen   Tablet .. 650 milliGRAM(s) Oral every 6 hours PRN Temp greater or equal to 38C (100.4F), Mild Pain (1 - 3)  guaiFENesin   Syrup  (Sugar-Free) 100 milliGRAM(s) Oral every 6 hours PRN Cough  magnesium hydroxide Suspension 30 milliLiter(s) Oral once PRN Constipation      Allergies    No Known Allergies    Intolerances        T(C): 36.4 (09-24-20 @ 07:46), Max: 37.1 (09-22-20 @ 19:47)  T(F): 97.6 (09-24-20 @ 07:46), Max: 98.7 (09-22-20 @ 19:47)  HR: 63 (09-24-20 @ 07:46) (60 - 88)  BP: 135/76 (09-24-20 @ 07:46) (112/70 - 138/80)  RR: 15 (09-24-20 @ 07:46) (15 - 15)  SpO2: 99% (09-24-20 @ 07:46) (96% - 100%)              LABS:      CBC Full  -  ( 23 Sep 2020 05:40 )  WBC Count : 4.50 K/uL  RBC Count : 2.49 M/uL  Hemoglobin : 8.5 g/dL  Hematocrit : 24.6 %  Platelet Count - Automated : 153 K/uL  Mean Cell Volume : 98.8 fl  Mean Cell Hemoglobin : 34.1 pg  Mean Cell Hemoglobin Concentration : 34.6 gm/dL  Auto Neutrophil # : x  Auto Lymphocyte # : x  Auto Monocyte # : x  Auto Eosinophil # : x  Auto Basophil # : x  Auto Neutrophil % : x  Auto Lymphocyte % : x  Auto Monocyte % : x  Auto Eosinophil % : x  Auto Basophil % : x    09-23    143  |  108  |  33<H>  ----------------------------<  92  4.1   |  29  |  1.71<H>    Ca    9.2      23 Sep 2020 05:40    TPro  5.6<L>  /  Alb  2.5<L>  /  TBili  0.2  /  DBili  x   /  AST  17  /  ALT  20  /  AlkPhos  54  09-23                  Physical Exam    Constitutional: alert, no acute distress    Abdomen: soft, nontender, nondistended, no HSM    Genitourinary: no bladder distention

## 2020-09-24 NOTE — DISCHARGE NOTE PROVIDER - NSDCCPCAREPLAN_GEN_ALL_CORE_FT
PRINCIPAL DISCHARGE DIAGNOSIS  Diagnosis: Cerebrovascular accident (CVA)  Assessment and Plan of Treatment: follow up neurology in 1-2 weeks, continue aspirin, xarelto and lipitor.      SECONDARY DISCHARGE DIAGNOSES  Diagnosis: Brain aneurysm  Assessment and Plan of Treatment: follow up neurosurgery    Diagnosis: Spinal stenosis  Assessment and Plan of Treatment: follow up spine surgery    Diagnosis: GI bleed  Assessment and Plan of Treatment: GI bleed-follow up Dr Olivo for cap endo    Diagnosis: BPH with urinary obstruction  Assessment and Plan of Treatment: BPH with urinary obstruction-follow up PCP

## 2020-09-24 NOTE — PROGRESS NOTE ADULT - PROBLEM SELECTOR PROBLEM 1
Anemia, unspecified type
BPH with urinary obstruction
Anemia, unspecified type
BPH with urinary obstruction
GI bleed

## 2020-09-24 NOTE — DISCHARGE NOTE PROVIDER - PROVIDER TOKENS
PROVIDER:[TOKEN:[[id number]]],PROVIDER:[TOKEN:[[id number]]],PROVIDER:[TOKEN:[[id number]]],PROVIDER:[TOKEN:[[id number]]],PROVIDER:[TOKEN:[[id number]]],PROVIDER:[TOKEN:[[id number]]],PROVIDER:[TOKEN:[[id number]]]

## 2020-09-30 ENCOUNTER — APPOINTMENT (OUTPATIENT)
Dept: NEUROSURGERY | Facility: CLINIC | Age: 80
End: 2020-09-30
Payer: MEDICARE

## 2020-09-30 VITALS
DIASTOLIC BLOOD PRESSURE: 78 MMHG | HEIGHT: 62 IN | HEART RATE: 96 BPM | SYSTOLIC BLOOD PRESSURE: 130 MMHG | BODY MASS INDEX: 30.91 KG/M2 | OXYGEN SATURATION: 96 % | RESPIRATION RATE: 17 BRPM | TEMPERATURE: 99.1 F | WEIGHT: 168 LBS

## 2020-09-30 DIAGNOSIS — Z87.448 PERSONAL HISTORY OF OTHER DISEASES OF URINARY SYSTEM: ICD-10-CM

## 2020-09-30 DIAGNOSIS — Z86.79 PERSONAL HISTORY OF OTHER DISEASES OF THE CIRCULATORY SYSTEM: ICD-10-CM

## 2020-09-30 DIAGNOSIS — I25.2 OLD MYOCARDIAL INFARCTION: ICD-10-CM

## 2020-09-30 PROBLEM — Z00.00 ENCOUNTER FOR PREVENTIVE HEALTH EXAMINATION: Status: ACTIVE | Noted: 2020-09-30

## 2020-09-30 PROCEDURE — 99214 OFFICE O/P EST MOD 30 MIN: CPT

## 2020-09-30 RX ORDER — RIVAROXABAN 2.5 MG/1
TABLET, FILM COATED ORAL
Refills: 0 | Status: ACTIVE | COMMUNITY

## 2020-09-30 RX ORDER — PANTOPRAZOLE SODIUM 40 MG/1
40 TABLET, DELAYED RELEASE ORAL
Refills: 0 | Status: ACTIVE | COMMUNITY

## 2020-09-30 RX ORDER — ASPIRIN 81 MG
81 TABLET, DELAYED RELEASE (ENTERIC COATED) ORAL
Refills: 0 | Status: ACTIVE | COMMUNITY

## 2020-09-30 RX ORDER — METOPROLOL TARTRATE 25 MG/1
25 TABLET, FILM COATED ORAL
Refills: 0 | Status: ACTIVE | COMMUNITY

## 2020-10-01 NOTE — PHYSICAL EXAM
[General Appearance - Alert] : alert [General Appearance - In No Acute Distress] : in no acute distress [Person] : oriented to person [Place] : oriented to place [Time] : oriented to time [Extraocular Movements] : extraocular movements were intact [] : no respiratory distress [FreeTextEntry6] : RUE 5/5 LUE 5/5 LLE 4/5 LUE 5/5 hand grasp strong

## 2020-10-01 NOTE — REASON FOR VISIT
[Follow-Up: _____] : a [unfilled] follow-up visit [Spouse] : spouse [FreeTextEntry1] : Castro Riojas is a 79 y.o male pmh of CAD s/p stents x 3 (2005), CKD, Afib on Xarelto, HTN,  s/p \par Medtronic PPM, myositis, presents with weakness and neck pain 8/30/2020.  He was admitted for stroke evaluation.  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 Per neurology he has right brain dysfunction, perhaps small-deep/subcortical or pontine but this is uncertain.  Etiology is probably acute ischemic stroke of uncertain mechanism, but possibly due to small vessel disease versus cardioembolism related to atrial fibrillation. Pt with PPM that is not compatible with MRI. \par Neurology recommended repeat CTH. CTH stable. TTE EF 65, bubble study with no evidence of PFO. Pt with CT spine noted showing Multilevel cervical spondylosis with severe canal stenosis at C4-C5. Xray C spine flexion and extension with no dynamic instability.  He as cleared to go to [facility] 9/3/2020. Recently he returned home 9/24/2020. Since he has been home he has been feeling well. He presented with left side weakness mostly in his leg. He feels he is getting strong and is now able to walk without a walker now. Two older brothers that had strokes but does not remember knowing whether they had and aneurysm or not. Social history for former smoker quit in 1975. Today he feels well denies any new motor, sensory, speech, visual abnormalities. \par \par PCP: Dr. Yon Carlson\par Cardiologist: Dr. Pako Covington

## 2020-10-01 NOTE — ASSESSMENT
[FreeTextEntry1] : Impression: 80yr old male with 5mm ACOMM aneurysm\par \par Plan:\par Reviewed CTA head which shows there is an ACOMM aneurysm about 5mm\par Discussed the natural history of this aneurysm and risk of rupture being about 1.5% per year \par Should the aneurysm rupture it would cause subarachnoid hemorrhage which is a life threatening condition a sign of rupture is sudden onset severe headache \par Discussed the option of diagnostic cerebral angiography to gain more information about the aneurysm and assess its size and morphology in possible preparation for treatment. Risks and benefits of angiography discussed with patient and wife in great detail. He is planning on seeing his PCP who manages his CKD tomorrow and we will reach out to evaluate risks benefits of the contrast in regards to the CKD. Recent creatinine while he was in the hospital was 1.7. \par

## 2020-10-01 NOTE — RESULTS/DATA
[FreeTextEntry1] : EXAM: CT ANGIO NECK (W)AW IC \par \par EXAM: CT ANGIO BRAIN (W)AW IC \par \par \par PROCEDURE DATE: Aug 30 2020 \par \par \par \par INTERPRETATION: CLINICAL INFORMATION: Code stroke. Left upper and lower extremity weakness. \par \par TECHNIQUE: Postcontrast axial CT images were acquired through the head. Additionally, contrast enhanced axial CT images were acquired from the aortic arch to the vertex of the calvarium, during the angiographic phase. 2-D and 3-dimensional maximum intensity projection reformats were generated from the angiographic images. Postcontrast axial CT images were also acquired through the head. 90 cc of Omnipaque-350 cc were administered intravenously, without immediate complication. 10 cc were discarded. \par \par COMPARISON: None. \par \par FINDINGS: \par \par CT ANGIOGRAPHY NECK: \par \par There is good flow-related enhancement of the bilateral common and internal carotid arteries. The vertebral arteries are well visualized. Carotid bifurcations unremarkable. Origins of the common carotid and vertebral arteries are unremarkable. \par \par There is no flow-limiting stenosis, evidence for arterial dissection, or vascular aneurysm. \par \par CT ANGIOGRAPHY BRAIN: \par \par Multilobulated superiorly projecting anterior communicating artery aneurysm. The dome measures 4 mm in size. The neck is broad, measuring 4 to 5 mm in size. \par \par There is good flow-related enhancement of the bilateral anterior, middle, and posterior cerebral arteries, and within the basilar artery. The intracranial / skull base internal carotid and intracranial vertebral arteries demonstrate normal flow-related enhancement. \par \par Right PCA demonstrates a fetal origin. Large left P-comm with hypoplastic left P1 segment. \par \par There is no flow-limiting stenosis. No AVM. \par \par IMPRESSION: \par \par CT ANGIOGRAPHY NECK: \par \par No flow-limiting stenosis or evidence for arterial dissection. \par \par CT ANGIOGRAPHY BRAIN: \par \par Multilobulated superiorly projecting anterior communicating artery aneurysm. The dome measures 4 mm in size. The neck is broad, measuring 4 to 5 mm in size. \par \par No flow-limiting stenosis. \par \par \par \par \par

## 2020-10-01 NOTE — CONSULT LETTER
[Dear  ___] : Dear  [unfilled], [Consult Letter:] : I had the pleasure of evaluating your patient, [unfilled]. [Consult Closing:] : Thank you very much for allowing me to participate in the care of this patient.  If you have any questions, please do not hesitate to contact me. [Sincerely,] : Sincerely, [FreeTextEntry1] : As you know, he is a 79 y.o male with history of CAD s/p stents x 3 (2005), CKD, Afib on Xarelto, HTN, Medtronic PPM, myositis, who had a recent hospital admission at [facility] for left leg weakness. He was unable to get an MRI because of his pacemaker, but the etiology of his presentation is presumed acute ischemic stroke vs small vessel disease. No large or small vessel occlusion was noted on CTA, but there was an incidental unruptured aneurysm of the anterior communicating artery found. The aneurysm is roughly 5mm and appears dysplastic and irregular. He was ultimately discharged to rehab, and then to home where he feels like he has made great improvement, now ambulating without a walker. \par \par Today we discussed the significance of the aneurysm. Given the size (borderline at 5mm) and dysplastic appearance of it, I would normally recommend cerebral angiogram followed most likely by endovascular treatment. Treatment would most likely require a stent which would necessitate dual antiplatelet regimen for at least 3 months on top of his Eliquis for afib. However due to his age and medical comorbidities, particularly the CKD, I am a bit hesitant. I am hoping to whitaker your opinion regarding risks of general anesthesia and contrast dye that is required for angiography and endovascular intervention. If you feel that the risks to his heart and kidneys is within an acceptable range, we will pursue angiogram and treatment. If not, we would follow the aneurysm with interval CTAs over time and accept the risk of rupture.  [FreeTextEntry3] : Mike Douglas

## 2020-10-15 ENCOUNTER — APPOINTMENT (OUTPATIENT)
Dept: NEUROSURGERY | Facility: CLINIC | Age: 80
End: 2020-10-15
Payer: MEDICARE

## 2020-10-15 VITALS
WEIGHT: 168 LBS | BODY MASS INDEX: 30.91 KG/M2 | OXYGEN SATURATION: 93 % | SYSTOLIC BLOOD PRESSURE: 113 MMHG | HEIGHT: 62 IN | DIASTOLIC BLOOD PRESSURE: 72 MMHG | TEMPERATURE: 98.6 F | HEART RATE: 72 BPM | RESPIRATION RATE: 16 BRPM

## 2020-10-15 PROCEDURE — 99213 OFFICE O/P EST LOW 20 MIN: CPT

## 2020-10-15 NOTE — ASSESSMENT
[FreeTextEntry1] : Impression: 80yr old male with unruptured 5mm ACOMM aneurysm\par \par Plan:\par Reviewed CTA head which shows there is an ACOMM aneurysm about 5mm\par Discussed the natural history of this aneurysm and risk of rupture being about 1.5% per year \par Should the aneurysm rupture it would cause subarachnoid hemorrhage which is a life threatening condition a sign of rupture is sudden onset severe headache \par Presented patients case in endovascular conference and general consensus was to proceed with conservative management taking into account the low rupture risk, the difficulty of treatment due to aneurysm morphology and arterial tortuosity in the chest, neck and head, and his medical comorbidities including atherosclerosis, prior stroke, afib requiring anticoagulation, and CKD. Discussed with the patient that the risks of treating his aneurysm is likely higher than the natural history of the aneurysm.  Recommend proceeding with repeat CTA head next year October 2021 then follow up in the office after. \par

## 2020-10-15 NOTE — PHYSICAL EXAM
[General Appearance - Alert] : alert [General Appearance - In No Acute Distress] : in no acute distress [Person] : oriented to person [Place] : oriented to place [Time] : oriented to time [Involuntary Movements] : no involuntary movements were seen [Motor Strength] : muscle strength was normal in all four extremities

## 2020-10-15 NOTE — REASON FOR VISIT
[Follow-Up: _____] : a [unfilled] follow-up visit [Spouse] : spouse [FreeTextEntry1] : Castro Riojas is a 79 y.o male pmh of CAD s/p stents x 3 (2005), CKD, Afib on Xarelto, HTN, s/p \par Medtronic PPM, myositis, presents with weakness and neck pain 8/30/2020. He was admitted for stroke evaluation. 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 Per neurology he has right brain dysfunction, perhaps small-deep/subcortical or pontine but this is uncertain. Etiology is probably acute ischemic stroke of uncertain mechanism, but possibly due to small vessel disease versus cardioembolism related to atrial fibrillation. Pt with PPM that is not compatible with MRI. \par Neurology workup included TTE EF 65, bubble study with no evidence of PFO. Pt with CT spine noted showing Multilevel cervical spondylosis with severe canal stenosis at C4-C5. Xray C spine flexion and extension with no dynamic instability. He was cleared to go to [facility] 9/3/2020. Recently he returned home 9/24/2020. Since he has been home he has been feeling well. He presented with left side weakness mostly in his leg. He feels he is getting strong and is now able to walk without a walker now. Two older brothers that had strokes but does not remember knowing whether they had and aneurysm or not. Social history for former smoker quit in 1975. Today he denies any new motor, sensory, speech, visual abnormalities. He recently was evaluated by his nephrologist Dr. Carlson to discuss risk benefits of angiography in regards to his CKD. Wife concerned patient lost weight and is not eating as much additionally he is generally weak and tired more than normal. \par \par PCP: Dr. Yon Carlson\par Cardiologist: Dr. Pako Covington \par

## 2020-10-15 NOTE — REVIEW OF SYSTEMS
[Recent Weight Loss (___ Lbs)] : recent [unfilled] ~Ulb weight loss [Feeling Tired] : feeling tired [Dizziness] : dizziness [Lightheadedness] : lightheadedness [Negative] : Heme/Lymph

## 2021-02-25 NOTE — DISCHARGE NOTE PROVIDER - NSDCQMSTAIRS_GEN_ALL_CORE
Proof of Pregnancy Letter    Esteban Aguirre  1992  [address] [facility]        02/25/21      Esteban Aguirre is a patient at our facility  Meganallison Castrejonakila Estimated Date of Delivery: 09/26/2021       Any questions or concerns, please feel free to contact our office      Sincerely,    [facility]    [address]   [facility], [address] [facility]   [phone] Yes

## 2021-06-02 ENCOUNTER — APPOINTMENT (OUTPATIENT)
Dept: NEUROLOGY | Facility: CLINIC | Age: 81
End: 2021-06-02
Payer: MEDICARE

## 2021-06-02 VITALS
HEIGHT: 71 IN | SYSTOLIC BLOOD PRESSURE: 145 MMHG | DIASTOLIC BLOOD PRESSURE: 78 MMHG | WEIGHT: 153 LBS | BODY MASS INDEX: 21.42 KG/M2 | HEART RATE: 90 BPM

## 2021-06-02 DIAGNOSIS — Z82.3 FAMILY HISTORY OF STROKE: ICD-10-CM

## 2021-06-02 DIAGNOSIS — E78.5 HYPERLIPIDEMIA, UNSPECIFIED: ICD-10-CM

## 2021-06-02 DIAGNOSIS — Z78.9 OTHER SPECIFIED HEALTH STATUS: ICD-10-CM

## 2021-06-02 DIAGNOSIS — I63.9 CEREBRAL INFARCTION, UNSPECIFIED: ICD-10-CM

## 2021-06-02 DIAGNOSIS — Z82.49 FAMILY HISTORY OF ISCHEMIC HEART DISEASE AND OTHER DISEASES OF THE CIRCULATORY SYSTEM: ICD-10-CM

## 2021-06-02 PROCEDURE — 99214 OFFICE O/P EST MOD 30 MIN: CPT

## 2021-06-02 PROCEDURE — 99072 ADDL SUPL MATRL&STAF TM PHE: CPT

## 2021-06-02 RX ORDER — FERROUS SULFATE TAB EC 324 MG (65 MG FE EQUIVALENT) 324 (65 FE) MG
324 (65 FE) TABLET DELAYED RESPONSE ORAL
Refills: 0 | Status: ACTIVE | COMMUNITY

## 2021-06-02 RX ORDER — CHROMIUM 200 MCG
TABLET ORAL
Refills: 0 | Status: ACTIVE | COMMUNITY

## 2021-06-02 NOTE — HISTORY OF PRESENT ILLNESS
[FreeTextEntry1] : I had the pleasure of seeing Mr. EVERARDO RIOJAS in the office today.  He is a 80 year right-handed patient who was referred for neurologic evaluation at your kind suggestion.  Was accompanied by Mrs. Riojas who assisted with the history.  \par \par Mr. Riojas suffers from hypertension, hyperlipidemia, coronary artery disease status post stenting, atrial fibrillation and renal insufficiency.\par \par In late August 2020, he experienced severe neck pain.  Soon thereafter, he developed weakness and numbness of his left arm and leg.  CT of the brain revealed white matter hypodensity compatible with mild microvascular changes.  There are calcium deposits in the basal ganglia and dentate nuclei.  CT angiography revealed no flow-limiting stenosis or arterial dissection in the neck.  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  At C4-5 there is moderate severe spinal stenosis.  There is mild spinal stenosis at C3-4, C5-6 and C6-7.  There was severe bilateral foraminal stenosis at C4-5.\par \par Transthoracic echocardiogram revealed an ejection fraction of 55 to 60%.  There was impaired left ventricular relaxation.  There was mild mitral regurgitation.  There is mild thickening of the anterior and posterior mitral valve leaflets.  A bubble study apparently revealed no evidence of PFO.\par \par He reports excellent improvement in his left-sided strength.  He has however chronic right-sided neck discomfort.  He complains of shooting pain down his left arm and chronic numbness of his first, second and third fingers.  He has a weak left hand grasp.  He has chronic numbness of the toes of both feet and intermittent low back pain.  He complains of nocturia.  He denies visual, cognitive or swallowing difficulties.  He is hard of hearing.\par \par MRIs were not performed because of the presence of a Medtronic pacemaker and [employer] leads.

## 2021-06-02 NOTE — ASSESSMENT
[FreeTextEntry1] : Mr. Riojas is a 80-year-old gentleman who suffers from hyperlipidemia, coronary artery disease status post stenting, and atrial fibrillation.  In August 2020, after experiencing recent neck pain, he was admitted to the hospital with acute onset of left-sided weakness.  A cerebrovascular event was suspected.  He has remained on Xarelto, aspirin and atorvastatin for cardiovascular prophylaxis.  His left-sided weakness improved.\par \par He has persistent neck pain and left upper extremity aching and numbness of his radial hand and fingers.  The depressed left biceps and brachioradialis reflexes are suggestive of cervical radiculopathy possibly involving the C5 and C6 nerve roots.  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  Serial imaging was recommended by neurosurgery.\par \par I will investigate whether his mixed pacemaker and lead device is MR-compatible.  If so, I will arrange noncontrast MRIs of the brain and cervical spine and MR angiography.  He will return to the office for EMG and nerve conduction studies of the left upper extremity to confirm the presence of a cervical radiculopathy and to exclude entrapment neuropathies, possibly carpal tunnel syndrome.  Further management will depend upon these results and his clinical course.  Thank you for the courtesy of this consultation.

## 2021-06-02 NOTE — CONSULT LETTER
[Dear  ___] : Dear  [unfilled], [Consult Letter:] : I had the pleasure of evaluating your patient, [unfilled]. [Please see my note below.] : Please see my note below. [Consult Closing:] : Thank you very much for allowing me to participate in the care of this patient.  If you have any questions, please do not hesitate to contact me. [Sincerely,] : Sincerely, [FreeTextEntry3] : Dayne Sandoval MD\par  [DrMarv  ___] : Dr. DUNN

## 2021-06-02 NOTE — PHYSICAL EXAM
[FreeTextEntry1] : Constitutional:  Patient was well-developed, well-nourished and in no acute distress. \par \par Head:  Normocephalic, atraumatic. Tympanic membranes were clear. \par \par Neck:  Supple with full range of motion. \par \par Cardiovascular:  Cardiac rhythm was regular without murmur. There were no carotid bruits. Peripheral pulses were full and symmetric. \par \par Respiratory:  Lungs were clear. \par \par Abdomen:  Soft and nontender. \par \par Spine:  Nontender. \par \par Skin:  There were no rashes. \par \par NEUROLOGICAL EXAMINATION:\par \par Mental Status: Patient was alert and oriented. Speech was fluent. There was no dysarthria. \par \par Cranial Nerves: \par \par II: Visual acuity was 20/20 in the right eye 20/25 left eye with glasses and near card. Pupils were equal and reactive. Visual fields were full.  The right optic disc was sharp and the left was nonvisualized.\par \par III, IV, VI:  Eye movements were full without nystagmus. \par \par V: Facial sensation was intact. \par \par VII: Facial strength was normal. \par \par VIII: Hearing was equal. \par \par IX, X: Palatal movement was normal. Phonation was normal. \par \par XI: Sternocleidomastoids and trapezii were normal. \par \par XII: Tongue was midline and movements normal. There was no lingual atrophy or fasciculations. \par \par Motor Examination: Muscle bulk, tone and strength were normal except for mild weakness of the interossei of both hands.  There was mild limitation of left finger flexion on attempted fisting.  There were mildly decreased fine finger movements in the left hand.  There was a mild postural tremor in his outstretched arms.\par \par Sensory Examination: There was decreased pinprick sensation in the left first, second and third fingers, lateral hand dorsum and palm.  There were no Tinel signs at the wrists or elbows.\par \par Reflexes: DTRs were 2+ at the knees and 2 at the ankles.  The right biceps and brachioradialis were 1 in the left were absent.  The right triceps was 2+ on the left was 2.\par \par Plantar Responses: Plantar responses were flexor. \par \par Coordination/Cerebellar Function: There was mild dysmetria on left finger-to-nose testing.\par \par Gait/Stance: Gait was normal. Romberg was negative.  Tandem was mildly unsteady.\par

## 2021-06-02 NOTE — REVIEW OF SYSTEMS
[Hand Weakness] :  hand weakness [Numbness] : numbness [Tingling] : tingling [Negative] : Heme/Lymph

## 2021-06-04 RX ORDER — CHLORTHALIDONE 25 MG/1
25 TABLET ORAL
Qty: 90 | Refills: 0 | Status: ACTIVE | COMMUNITY
Start: 2021-03-19

## 2021-06-04 RX ORDER — ALLOPURINOL 100 MG/1
100 TABLET ORAL
Qty: 90 | Refills: 0 | Status: ACTIVE | COMMUNITY
Start: 2020-07-08

## 2021-06-04 RX ORDER — ATORVASTATIN CALCIUM 80 MG/1
80 TABLET, FILM COATED ORAL
Qty: 90 | Refills: 0 | Status: ACTIVE | COMMUNITY
Start: 2021-04-19

## 2021-07-15 ENCOUNTER — APPOINTMENT (OUTPATIENT)
Dept: ORTHOPEDIC SURGERY | Facility: CLINIC | Age: 81
End: 2021-07-15

## 2021-07-19 ENCOUNTER — APPOINTMENT (OUTPATIENT)
Dept: ORTHOPEDIC SURGERY | Facility: CLINIC | Age: 81
End: 2021-07-19
Payer: MEDICARE

## 2021-07-19 DIAGNOSIS — Z86.79 PERSONAL HISTORY OF OTHER DISEASES OF THE CIRCULATORY SYSTEM: ICD-10-CM

## 2021-07-19 DIAGNOSIS — Z87.39 PERSONAL HISTORY OF OTHER DISEASES OF THE MUSCULOSKELETAL SYSTEM AND CONNECTIVE TISSUE: ICD-10-CM

## 2021-07-19 DIAGNOSIS — Z86.39 PERSONAL HISTORY OF OTHER ENDOCRINE, NUTRITIONAL AND METABOLIC DISEASE: ICD-10-CM

## 2021-07-19 DIAGNOSIS — Z87.898 PERSONAL HISTORY OF OTHER SPECIFIED CONDITIONS: ICD-10-CM

## 2021-07-19 PROCEDURE — 99072 ADDL SUPL MATRL&STAF TM PHE: CPT

## 2021-07-19 PROCEDURE — 99204 OFFICE O/P NEW MOD 45 MIN: CPT

## 2021-07-19 NOTE — HISTORY OF PRESENT ILLNESS
[de-identified] : This 80-year-old male is referred by Dr. Dayne moore for evaluation of spine related symptoms.  The patient is seen in the office today along with his wife.  At first he relates that he had a stroke in August of last year at which point he had neck pain and left arm pain.  Later in the history it became apparent that the symptoms began after lifting a heavy box to put on top of the refrigerator.  He had a neurologic deficit with some weakness that has shown significant improvement.  Initially he had some balance problems that have improved to a large degree.  There is still some unsteadiness getting up out of a chair after prolonged period of time and initiating walking.  He has not had any falls.  He has had some difficulty with hand function buttoning shirts.  Currently he has some neck symptoms that are described as an intermittent discomfort.  He has some numbness in the radial digits of his left hand.  He denies paresthesias.  There is no Valsalva effect.  He has not had night pain.\par \par He is on Xarelto since he had cardiac stents.  He had a pacemaker placed in 2015.  He has hypertension and hyperlipidemia.  He has had gout in the past.  He is on Protonix which controls some reflux symptoms.  He has been on gabapentin but it made him dizzy and it recently ran out.  His wife tells me he has some chronic kidney dysfunction.  He is retired from his work as a . [Pain Location] : pain [Improving] : improving [0] : a minimum pain level of 0/10 [3] : a maximum pain level of 3/10 [Intermit.] : ~He/She~ states the symptoms seem to be intermittent

## 2021-07-19 NOTE — DISCUSSION/SUMMARY
[Medication Risks Reviewed] : Medication risks reviewed [de-identified] : I am uncertain if he had a stroke or cervical spine symptoms with a cord contusion secondary to his stenosis after lifting his heavy box on top of the refrigerator.  The patient feels that he is made marked improvement in strength since last summer his episode.  He cannot take nonsteroidal anti-inflammatory medicines as he is on Xarelto.  His wife tells me he has some chronic renal conditions but we do not have a creatinine available.  He has signal changes in the cord.  He has some mild hand dysfunction.  There is some minimal balance problems.  He has not had falls.  In light of his general medical health I do not think surgical management is indicated.  It would not have any predictable improvement of hand function or sensation.  We discussed that he is at risk of further neurological compromise with a fall or an accident.  I will see him for follow-up on a as needed basis.  He is planning on stopping the gabapentin and is currently taking only 1 Tylenol a day.  He will push the Tylenol to the full dose level.

## 2021-07-19 NOTE — CONSULT LETTER
[Dear  ___] : Dear  [unfilled], [Please see my note below.] : Please see my note below. [Sincerely,] : Sincerely, [FreeTextEntry1] : Thank you for referring this gentleman for evaluation of his spine.

## 2021-07-19 NOTE — REVIEW OF SYSTEMS
[Heartburn] : heartburn [Negative] : Genitourinary [FreeTextEntry5] : Status post pacemaker and cardiac stents

## 2021-07-19 NOTE — PHYSICAL EXAM
[de-identified] : He seems fully alert and oriented with a normal mood and affect.  He ambulates with a normal gait including tiptoe and heel walking.  There was some unsteadiness on attempting heel walking.  There is no evidence of spasticity.  There are no cutaneous abnormalities or palpable bony defects of the spine.  There is no evidence of shortness of breath or respiratory distress.  His lower extremity neurological examination revealed 1-2+ symmetrical reflexes with downgoing toes.  Motor power is normal to manual testing in all lower extremity groups and sensation is normal to light touch in all dermatomes.  Straight leg raising is negative to 90 degrees.  His hips and knees have full and painless range of motion with normal stability.  Vascular examination shows no evidence of varicosities and there is no lymphedema.  Upper extremity motor power is normal to manual testing in all upper extremity groups.  There is decreased sensation to light touch in the left thumb index and long fingers.  Reflexes are 1-2+ at the biceps and brachial radialis on the right and 2+ at the triceps.  Brachioradialis and biceps reflexes are absent on the left.  There is discomfort and a mild limitation of motion of the right shoulder but the left shoulder has a full and painless range of motion. [de-identified] : MRI of the cervical spine reveals multilevel degenerative changes with disc bulges at most levels and a significant disc ridge complex with severe stenosis at the C4-5 level.  There are signal changes within the cord directly below that.

## 2021-07-19 NOTE — REASON FOR VISIT
[Initial Visit] : an initial visit for [Neck Pain] : neck pain [Radiculopathy] : radiculopathy [Spinal Stenosis] : spinal stenosis

## 2021-07-21 ENCOUNTER — NON-APPOINTMENT (OUTPATIENT)
Age: 81
End: 2021-07-21

## 2021-07-29 ENCOUNTER — APPOINTMENT (OUTPATIENT)
Dept: NEUROLOGY | Facility: CLINIC | Age: 81
End: 2021-07-29
Payer: MEDICARE

## 2021-07-29 PROCEDURE — 95911 NRV CNDJ TEST 9-10 STUDIES: CPT

## 2021-07-29 PROCEDURE — 95886 MUSC TEST DONE W/N TEST COMP: CPT

## 2021-07-29 PROCEDURE — 99072 ADDL SUPL MATRL&STAF TM PHE: CPT

## 2021-07-29 NOTE — CONSULT LETTER
[Dear  ___] : Dear  [unfilled], [Consult Letter:] : I had the pleasure of evaluating your patient, [unfilled]. [Please see my note below.] : Please see my note below. [Consult Closing:] : Thank you very much for allowing me to participate in the care of this patient.  If you have any questions, please do not hesitate to contact me. [Sincerely,] : Sincerely, [FreeTextEntry3] : Dayne Sandoval MD\par  [DrMarv  ___] : Dr. DUNN [DrMarv ___] : Dr. DUNN

## 2021-07-29 NOTE — PROCEDURE
[FreeTextEntry1] : Nerve conduction studies and electromyography [FreeTextEntry2] : Neck pain, left upper extremity numbness and depressed left biceps and brachioradialis reflexes. [FreeTextEntry3] : Electrodiagnostic testing was performed in the left upper and right lower extremities.\par \par The radial and median sensory potentials were low in amplitude and conduction velocities were mildly slow.  The ulnar sensory nerve was nonreactive.\par \par The median motor distal latency and compound muscle action potential were normal.  Median conduction velocity was mildly slow.  The ulnar motor distal latency was minimally prolonged and the compound muscle action potential was normal.  Ulnar conduction velocity was mildly slow.  The lumbrical–interosseous comparison study was normal.  The median F wave was prolonged and the ulnar F wave was normal.\par \par The right sural sensory nerve was nonreactive.  The right tibial motor motor distal latency was normal but the compound muscle action potential was low in amplitude.  Tibial conduction velocity was mildly slowed.  The tibial H reflex was prolonged.\par \par Needle electromyography was performed in several left upper extremity muscles.  The cervical paraspinal muscles were not examined due to use of anticoagulation.  There was mild spontaneous activity in the brachioradialis only.  There was no evidence of motor unit loss and reinnervation in the brachioradialis, biceps and deltoid.  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  At C5-6, there was moderate canal narrowing with indentation of the cord without abnormal signal.  There were cystic lesions within both parapharyngeal soft tissues not well visualized.  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  There was ectatic appearance of the cavernous left ICA with a 3 mm medially oriented aneurysm and suspected additionally laterally oriented 2 mm aneurysm.\par \par He underwent spine surgical consultation with Dr. Brian Lorenzo.  He did not feel that he was a surgical candidate.\par \par I will retrieve recent blood test results for review and supplement as needed to evaluate his sensorimotor polyneuropathy.  Further management will depend upon his clinical course.

## 2021-08-05 ENCOUNTER — NON-APPOINTMENT (OUTPATIENT)
Age: 81
End: 2021-08-05

## 2021-08-06 NOTE — DISCHARGE NOTE NURSING/CASE MANAGEMENT/SOCIAL WORK - NSDCPEPTSTROKESIGNS_GEN_ALL_CORE
Sudden numbness or weakness of the face, arm, or leg, especially on one side of the body. Confusion, trouble speaking or understanding. Trouble seeing in one or both eyes. Trouble walking, dizziness, loss of balance or coordination. Severe headache. [Clear Rhinorrhea] : clear rhinorrhea [Inflamed Nasal Mucosa] : inflamed nasal mucosa [Soft] : soft [Non Distended] : non distended [Warm, Well Perfused x4] : warm, well perfused x4 [NL] : warm [FreeTextEntry5] : Pink, noninjected conjunctiva, no discharge

## 2021-11-12 ENCOUNTER — EMERGENCY (EMERGENCY)
Facility: HOSPITAL | Age: 81
LOS: 1 days | Discharge: HOME | End: 2021-11-12
Attending: STUDENT IN AN ORGANIZED HEALTH CARE EDUCATION/TRAINING PROGRAM | Admitting: STUDENT IN AN ORGANIZED HEALTH CARE EDUCATION/TRAINING PROGRAM
Payer: MEDICARE

## 2021-11-12 VITALS
SYSTOLIC BLOOD PRESSURE: 155 MMHG | DIASTOLIC BLOOD PRESSURE: 92 MMHG | RESPIRATION RATE: 18 BRPM | HEART RATE: 86 BPM | HEIGHT: 73 IN | OXYGEN SATURATION: 100 % | TEMPERATURE: 98 F

## 2021-11-12 VITALS
OXYGEN SATURATION: 100 % | HEART RATE: 75 BPM | TEMPERATURE: 98 F | RESPIRATION RATE: 18 BRPM | SYSTOLIC BLOOD PRESSURE: 123 MMHG | DIASTOLIC BLOOD PRESSURE: 85 MMHG

## 2021-11-12 DIAGNOSIS — Z98.890 OTHER SPECIFIED POSTPROCEDURAL STATES: Chronic | ICD-10-CM

## 2021-11-12 LAB
ALBUMIN SERPL ELPH-MCNC: 3.4 G/DL — SIGNIFICANT CHANGE UP (ref 3.3–5)
ALP SERPL-CCNC: 92 U/L — SIGNIFICANT CHANGE UP (ref 40–120)
ALT FLD-CCNC: 29 U/L — SIGNIFICANT CHANGE UP (ref 4–41)
ANION GAP SERPL CALC-SCNC: 13 MMOL/L — SIGNIFICANT CHANGE UP (ref 7–14)
AST SERPL-CCNC: 33 U/L — SIGNIFICANT CHANGE UP (ref 4–40)
BILIRUB SERPL-MCNC: 0.5 MG/DL — SIGNIFICANT CHANGE UP (ref 0.2–1.2)
BUN SERPL-MCNC: 29 MG/DL — HIGH (ref 7–23)
CALCIUM SERPL-MCNC: 9.8 MG/DL — SIGNIFICANT CHANGE UP (ref 8.4–10.5)
CHLORIDE SERPL-SCNC: 100 MMOL/L — SIGNIFICANT CHANGE UP (ref 98–107)
CO2 SERPL-SCNC: 23 MMOL/L — SIGNIFICANT CHANGE UP (ref 22–31)
CREAT SERPL-MCNC: 1.78 MG/DL — HIGH (ref 0.5–1.3)
GLUCOSE SERPL-MCNC: 90 MG/DL — SIGNIFICANT CHANGE UP (ref 70–99)
POTASSIUM SERPL-MCNC: 4.4 MMOL/L — SIGNIFICANT CHANGE UP (ref 3.5–5.3)
POTASSIUM SERPL-SCNC: 4.4 MMOL/L — SIGNIFICANT CHANGE UP (ref 3.5–5.3)
PROT SERPL-MCNC: 6.8 G/DL — SIGNIFICANT CHANGE UP (ref 6–8.3)
SODIUM SERPL-SCNC: 136 MMOL/L — SIGNIFICANT CHANGE UP (ref 135–145)

## 2021-11-12 PROCEDURE — 74177 CT ABD & PELVIS W/CONTRAST: CPT | Mod: 26,MA

## 2021-11-12 PROCEDURE — 99285 EMERGENCY DEPT VISIT HI MDM: CPT

## 2021-11-12 RX ORDER — MULTIVIT WITH MIN/MFOLATE/K2 340-15/3 G
1 POWDER (GRAM) ORAL ONCE
Refills: 0 | Status: COMPLETED | OUTPATIENT
Start: 2021-11-12 | End: 2021-11-12

## 2021-11-12 RX ADMIN — Medication 1 BOTTLE: at 15:20

## 2021-11-12 NOTE — ED ADULT TRIAGE NOTE - CHIEF COMPLAINT QUOTE
Pt arrives with c-collar s/p cervical surgery on 10/19, pt c/o constipation, last bowel movement 10/23. Denies abd pain, n/v. PMHx htn, hld

## 2021-11-12 NOTE — ED PROVIDER NOTE - NS ED ROS FT
GENERAL: No fever or chills, EYES: no change in vision, HEENT: no trouble swallowing or speaking, CARDIAC: no chest pain, PULMONARY: no cough or SOB, GI: +CONSTIPATION, no abdominal pain, no nausea, no vomiting, no diarrhea, : No changes in urination, SKIN: no rashes, NEURO: no headache,  MSK: No joint pain     All other ROS negative unless otherwise specified in HPI.     ~Neo Santiago M.D. Resident

## 2021-11-12 NOTE — ED ADULT NURSE NOTE - OBJECTIVE STATEMENT
Break coverage RN - Pt received in room 11, A&Ox3 c/o constipation. Arrives in C-Collar s/p spinal surgery on 10/19 & 10/22. States he has not had a bowel movement since discharge. States he has been taking Oxycodone for pain at home. Denies abdominal pain, N/V/D. Resp even and unlabored. Medicated as per order. Will continue to monitor.

## 2021-11-12 NOTE — ED PROVIDER NOTE - PATIENT PORTAL LINK FT
You can access the FollowMyHealth Patient Portal offered by Health system by registering at the following website: [email or web address]. By joining [employer]’s FollowMyHealth portal, you will also be able to view your health information using other applications (apps) compatible with our system.

## 2021-11-12 NOTE — ED PROVIDER NOTE - PROGRESS NOTE DETAILS
Manual disimpaction unsuccessful, soft stool in vault but no masses or blockages to remove. Chaperoned by PCA Leatha [...]. Pt feels like trying to have BM. - Neo Santiago, PGY-1 Manual disimpaction unsuccessful, soft stool in vault but no masses or blockages to remove. Chaperoned by PCA Radha rCooks. Pt feels like trying to have BM. - Neo Santiago, PGY-1 Erica Beck MD (PGY2) -  Pt had a BM here in the ED. Pt seen & reassessed.  Pt symptomatically improved.  NAD, VSS, pt tolerated PO.  We discussed the results of ED w/u w/patient (incl. presumptive Emergency Department dx, associated anticipatory guidance, stressing importance of prompt f/u, return precautions), & gave them a copy of results.  Patient verbalized understanding of ED course & agreed with our f/u recommendations, has decisional making capacity.  Pt st they will f/u w/PMD within the next 3 days; pt agrees to call today or tomorrow for an appointment. Pt agrees to return to the ED if there is any worsening or concerning symptoms.

## 2021-11-12 NOTE — ED PROVIDER NOTE - PHYSICAL EXAMINATION
Gen: AAOx3, non-toxic, skinny elderly male lying in bed in c-collar  Head: NCAT  HEENT: EOMI, PERRLA, (+)oral mucosa dry, normal conjunctiva  Lung: CTAB, no respiratory distress, no wheezes/rhonchi/rales B/L, speaking in full sentences  CV: RRR, no murmurs, rubs or gallops  Abd: soft, NTND, no guarding, no CVA tenderness, no flank pain  MSK: no visible deformities  Neuro: No focal sensory or motor deficits  Skin: Warm, well perfused, no rash  Psych: normal affect.   ~Neo Santiago M.D. Resident

## 2021-11-12 NOTE — ED PROVIDER NOTE - NSICDXPASTMEDICALHX_GEN_ALL_CORE_FT
PAST MEDICAL HISTORY:  CAD (coronary artery disease) s/p 3 stents, pacemaker    Hyperlipidemia     Hypertension     Stroke 8/3/2020; no residual deficits

## 2021-11-12 NOTE — ED PROVIDER NOTE - ATTENDING CONTRIBUTION TO CARE
I have personally performed a history and physical examination of the patient and discussed management with the resident as well as the patient.  I reviewed the resident's note and agree with the documented findings and plan of care.  I have authored and modified critical sections of the Provider Note, including but not limited to HPI, Physical Exam and MDM.    Pt is an 81yoM w/Hx of stroke 8/2020 w/o residual deficits, CAD s/p 3 stents and pacemaker on xarelto, HTN, HLD, p/w 2+ weeks of constipation. Pt had cervical spine surgery on the 22nd, last BM was in hospital on the 23rd with medications. Passing gas. Consider SBO vs functional constipation vs opiate induced constipation vs ileus. No neuro deficits, no suspicion for spinal infection at this time. Obtain ct abd/pelvis to r/o sbo. Symptomatic control prn. Pt denies any recent mid/low back procedures, injections, fevers, chills, IVDA, steroid use, History of TB/cancer, HIV, numbness, tingling, trouble urinating, changes in bowel or bladder habits, saddle anesthesia.

## 2021-11-12 NOTE — ED PROVIDER NOTE - OBJECTIVE STATEMENT
Pt is an 81yoM w/Hx of stroke 8/2020 w/o residual deficits, CAD s/p 3 stents and pacemaker on xarelto, HTN, HLD, p/w 2+ weeks of constipation. Pt had cervical spine surgery on the 22nd, last BM was in hospital on the 23rd with medications. Since pt has had no BMs, usually has BMs once a day/every other day, has been eating and drinking appropriately (pt recently just started eating solid foods again 2/2 surgery). Pt is passing gas; He denies HA, visual changes, hearing changes, cough/sore throat/congestion,  SOB, pain with breathing, CP, palpitations, back pain, abd pain, n/v/d, dysuria/freq/urg, hematuria/hematochezia/melena, numbness/tingling, focal/generalized weakness, swelling, dizziness/lightheadedness, fevers/chills, no sick contacts, no recent travel.

## 2021-11-12 NOTE — ED PROVIDER NOTE - CLINICAL SUMMARY MEDICAL DECISION MAKING FREE TEXT BOX
Pt is an 81yoM w/Hx of Pt is an 81yoM w/Hx of stroke 8/2020 w/o residual deficits, CAD s/p 3 stents and pacemaker on xarelto, HTN, HLD, p/w 2+ weeks of constipation. Pt had cervical spine surgery on the 22nd, last BM was in hospital on the 23rd with medications. Passing gas. Consider SBO vs functional constipation vs opiate induced constipation. Obtain ct abd/pelvis to r/o sbo. Symptomatic control prn.

## 2021-11-13 LAB
BASOPHILS # BLD AUTO: 0.02 K/UL — SIGNIFICANT CHANGE UP (ref 0–0.2)
BASOPHILS NFR BLD AUTO: 0.3 % — SIGNIFICANT CHANGE UP (ref 0–2)
EOSINOPHIL # BLD AUTO: 0.11 K/UL — SIGNIFICANT CHANGE UP (ref 0–0.5)
EOSINOPHIL NFR BLD AUTO: 1.5 % — SIGNIFICANT CHANGE UP (ref 0–6)
HCT VFR BLD CALC: 28.8 % — LOW (ref 39–50)
HGB BLD-MCNC: 10.2 G/DL — LOW (ref 13–17)
IANC: 5.84 K/UL — SIGNIFICANT CHANGE UP (ref 1.5–8.5)
IMM GRANULOCYTES NFR BLD AUTO: 0.3 % — SIGNIFICANT CHANGE UP (ref 0–1.5)
LYMPHOCYTES # BLD AUTO: 0.97 K/UL — LOW (ref 1–3.3)
LYMPHOCYTES # BLD AUTO: 13.1 % — SIGNIFICANT CHANGE UP (ref 13–44)
MCHC RBC-ENTMCNC: 34.9 PG — HIGH (ref 27–34)
MCHC RBC-ENTMCNC: 35.4 GM/DL — SIGNIFICANT CHANGE UP (ref 32–36)
MCV RBC AUTO: 98.6 FL — SIGNIFICANT CHANGE UP (ref 80–100)
MONOCYTES # BLD AUTO: 0.45 K/UL — SIGNIFICANT CHANGE UP (ref 0–0.9)
MONOCYTES NFR BLD AUTO: 6.1 % — SIGNIFICANT CHANGE UP (ref 2–14)
NEUTROPHILS # BLD AUTO: 5.84 K/UL — SIGNIFICANT CHANGE UP (ref 1.8–7.4)
NEUTROPHILS NFR BLD AUTO: 78.7 % — HIGH (ref 43–77)
NRBC # BLD: 0 /100 WBCS — SIGNIFICANT CHANGE UP
NRBC # FLD: 0 K/UL — SIGNIFICANT CHANGE UP
PLATELET # BLD AUTO: 134 K/UL — LOW (ref 150–400)
RBC # BLD: 2.92 M/UL — LOW (ref 4.2–5.8)
RBC # FLD: 13.2 % — SIGNIFICANT CHANGE UP (ref 10.3–14.5)
WBC # BLD: 7.41 K/UL — SIGNIFICANT CHANGE UP (ref 3.8–10.5)
WBC # FLD AUTO: 7.41 K/UL — SIGNIFICANT CHANGE UP (ref 3.8–10.5)

## 2021-11-24 NOTE — CHART NOTE - NSCHARTNOTEFT_GEN_A_CORE
Discussed case and CT neck results with neurosurgery team 9/2/2020.     No acute fractures or dislocations. Multilevel cervical spondylosis with severe canal stenosis at C4-C5.    Ordered Xray of the neck extension/flexion per recommendations. Team to continue to monitor.
Xray Cervical Spine Complete w Flex+Ext    IMPRESSION:  Redemonstrated multilevel mid and lower cervical degenerative disease with reversed cervical lordosis.    C4 on C5 C5 on C6 and C6 on C7 retrolistheses again noted however without associated dynamic instability. No evidence for atlantoaxial dynamic instability.    Radiographic evaluation of neuroforaminal patency limited by suboptimal positioning.    No significant interval changes.    Refer to recently performed C-spine CT from 9/1/2020 for further detailed evaluation.    Left chest wall dual-lead cardiac pacing device noted.      Xray reviewed with neurosurgery, pt stable and cleared from neurosurgery standpoint. outpatient followup with neurosurgery 1 week after discharge from rehab
Detail Level: Zone

## 2022-01-05 ENCOUNTER — TRANSCRIPTION ENCOUNTER (OUTPATIENT)
Age: 82
End: 2022-01-05

## 2022-01-10 ENCOUNTER — APPOINTMENT (OUTPATIENT)
Dept: NEUROLOGY | Facility: CLINIC | Age: 82
End: 2022-01-10
Payer: MEDICARE

## 2022-01-10 DIAGNOSIS — M48.02 SPINAL STENOSIS, CERVICAL REGION: ICD-10-CM

## 2022-01-10 PROCEDURE — 99449 NTRPROF PH1/NTRNET/EHR 31/>: CPT

## 2022-01-10 NOTE — REASON FOR VISIT
[Home] : at home, [unfilled] , at the time of the visit. [Medical Office: ([facility])___] : at the medical office located in  [Verbal consent obtained from patient] : the patient, [unfilled]

## 2022-01-10 NOTE — ASSESSMENT
[FreeTextEntry1] : Mr. Riojas is an 81-year-old gentleman who suffers from hyperlipidemia, coronary artery disease status post stenting, and atrial fibrillation.  He has cervical myeloradiculopathy and is status post surgical decompression and fusion almost 3 months ago.  He has residual left upper extremity neurologic symptoms likely due to a combination of radicular and myelopathic dysfunction.  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  He was previously evaluated for that process by Dr. Mike Douglas, a neurosurgeon at [facility].  I suggested a follow-up MR angiogram sometime within the next several months.  Timing will likely be dictated by the stability of his surgical cervical fusion.  I look forward to Dr. King's reassessment.\par \par I suggested a routine follow-up visit in 4 months.  Telephone contact will be maintained in the meantime.

## 2022-01-10 NOTE — HISTORY OF PRESENT ILLNESS
[FreeTextEntry1] : Mr. EVERARDO FIGUEROA was last evaluated on July 29, 2021.  He is an 81 year right-handed patient who suffers from hypertension, hyperlipidemia, coronary artery disease status post stenting, atrial fibrillation and renal insufficiency.\par \par In late August 2020, he experienced severe neck pain.  Soon thereafter, he developed weakness and numbness of his left arm and leg.  CT of the brain revealed white matter hypodensity compatible with mild microvascular changes.  There were calcium deposits in the basal ganglia and dentate nuclei.  CT angiography revealed no flow-limiting stenosis or arterial dissection in the neck.  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  At C4-5, there was moderate severe spinal stenosis.  There was mild spinal stenosis at C3-4, C5-6 and C6-7.  There was severe bilateral foraminal stenosis at C4-5.\par \par Transthoracic echocardiogram revealed an ejection fraction of 55 to 60%.  There was impaired left ventricular relaxation.  There was mild mitral regurgitation.  There is mild thickening of the anterior and posterior mitral valve leaflets.  A bubble study apparently revealed no evidence of PFO.\par \par He reported excellent improvement in his left-sided strength.  He has however chronic right-sided neck discomfort.  He complains of shooting pain down his left arm and chronic numbness of his first, second and third fingers.  He had a weak left hand grasp, chronic numbness of the toes of both feet and intermittent low back pain.  He complained of nocturia.  He denied visual, cognitive or swallowing difficulties.  He is hard of hearing.\par \par Electrodiagnostic study performed on July 29, 2021 revealed a moderately severe axonal sensorimotor polyneuropathy.  There was electrophysiologic evidence of a predominantly chronic left C5 and C6 cervical radiculopathy.  There was no electrophysiologic evidence of left median or ulnar neuropathy.\par \par MRI of the cervical spine revealed a disc plus arthritic changes at C4-5 with severe spinal canal narrowing and abnormal cord signal.  At C5-6, there was moderate canal narrowing with indentation of the cord without abnormal cord signal.  There were cystic lesions within both parapharyngeal soft tissues which were not well visualized.  CT of the neck was suggested.\par \par MRI of the brain revealed mild white matter changes.  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  There was ectatic appearance of the cavernous left internal carotid artery with a 3 mm medially oriented aneurysm and suspected additional laterally oriented 2 mm aneurysm.\par \par On October 19 and 22, 2021, he underwent cervical decompression and fusion performed by Dr. Gómez King at [facility].\par \par He reports unchanged left upper extremity symptoms.  He has a painful left biceps and hand.  He has numbness of his left first, second and third fingers.  His right arm and legs are unaffected.  He is scheduled for a follow-up visit with Dr. King next week.\par \par MRIs were not performed because of the presence of a Medtronic pacemaker and [employer] leads.

## 2022-02-28 PROBLEM — I63.9 CEREBRAL INFARCTION, UNSPECIFIED: Chronic | Status: ACTIVE | Noted: 2021-11-12

## 2022-02-28 PROBLEM — I25.10 ATHEROSCLEROTIC HEART DISEASE OF NATIVE CORONARY ARTERY WITHOUT ANGINA PECTORIS: Chronic | Status: ACTIVE | Noted: 2021-11-12

## 2022-03-01 ENCOUNTER — APPOINTMENT (OUTPATIENT)
Dept: NEUROLOGY | Facility: CLINIC | Age: 82
End: 2022-03-01
Payer: MEDICARE

## 2022-03-01 VITALS
BODY MASS INDEX: 19.6 KG/M2 | SYSTOLIC BLOOD PRESSURE: 153 MMHG | WEIGHT: 140 LBS | HEIGHT: 71 IN | DIASTOLIC BLOOD PRESSURE: 89 MMHG | HEART RATE: 85 BPM

## 2022-03-01 DIAGNOSIS — M54.12 RADICULOPATHY, CERVICAL REGION: ICD-10-CM

## 2022-03-01 DIAGNOSIS — G60.8 OTHER HEREDITARY AND IDIOPATHIC NEUROPATHIES: ICD-10-CM

## 2022-03-01 DIAGNOSIS — M47.812 SPONDYLOSIS W/OUT MYELOPATHY OR RADICULOPATHY, CERVICAL REGION: ICD-10-CM

## 2022-03-01 PROCEDURE — 99214 OFFICE O/P EST MOD 30 MIN: CPT

## 2022-03-01 RX ORDER — DULOXETINE HYDROCHLORIDE 20 MG/1
20 CAPSULE, DELAYED RELEASE PELLETS ORAL
Qty: 90 | Refills: 1 | Status: ACTIVE | COMMUNITY
Start: 2022-03-01 | End: 1900-01-01

## 2022-03-01 NOTE — PHYSICAL EXAM
[FreeTextEntry1] : Constitutional:  Patient was well-developed, well-nourished and in no acute distress. \par \par Head:  Normocephalic, atraumatic. Tympanic membranes were not examined.\par \par Neck:  Supple with limited movement in all directions.\par \par Cardiovascular:  Cardiac rhythm was regular without murmur. There were no carotid bruits. Peripheral pulses were full and symmetric. \par \par Respiratory:  Lungs were clear. \par \par Abdomen:  Soft and nontender. \par \par Spine:  Nontender. \par \par Skin:  There were no rashes. \par \par NEUROLOGICAL EXAMINATION:\par \par Mental Status: Patient was alert and oriented. Speech was fluent. There was no dysarthria. \par \par Cranial Nerves: \par \par II: He could finger count bilaterally. Pupils were equal and reactive. Visual fields were full.  Fundi were not visualized.\par \par III, IV, VI:  Eye movements were full without nystagmus. \par \par V: Facial sensation was intact. \par \par VII: Facial strength was normal. \par \par VIII: Hearing was equal. \par \par IX, X: Palatal movement was normal. Phonation was normal. \par \par XI: Sternocleidomastoids and trapezii were normal. \par \par XII: Tongue was midline and movements normal. There was no lingual atrophy or fasciculations. \par \par Motor Examination: Muscle bulk, tone and strength were normal except for mild left greater than right intrinsic hand muscle weakness. \par \par Sensory Examination: There was decreased pinprick sensation in the left first, second and third fingers.  Vibration sense was absent in the feet.\par \par Reflexes: DTRs were 2 throughout except for the left biceps and brachioradialis which were absent.\par \par Plantar Responses: Plantar responses were flexor. \par \par Coordination/Cerebellar Function: There was no dysmetria on finger to nose or heel to shin testing. \par \par Gait/Stance: Gait was normal.  Tandem was unsteady.  Romberg was negative.\par

## 2022-03-01 NOTE — ASSESSMENT
[FreeTextEntry1] : Mr. Riojas is an 81-year-old gentleman who suffers from hyperlipidemia, coronary artery disease status post stenting, and atrial fibrillation.  He has residual cervical myeloradiculopathy particularly on the left hand.  He has been intolerant to increased doses of gabapentin.  I suggested addition of duloxetine 20 mg to his regimen.  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  He is under the care of Dr. Mike Douglas a neurosurgeon.  Dr. Douglas did not suggest intervention.  His last MRI was performed in July 2021.  I will discuss obtaining a follow-up MRA study at the time of his next visit.  Further management will depend upon his clinical course.

## 2022-03-01 NOTE — HISTORY OF PRESENT ILLNESS
[FreeTextEntry1] : Mr. EVERARDO FIGUEROA was last evaluated on January 10, 2020 via telephone.  He is an 81 year right-handed patient who suffers from hypertension, hyperlipidemia, coronary artery disease status post stenting, atrial fibrillation and renal insufficiency.\par \par In late August 2020, he experienced severe neck pain.  Soon thereafter, he developed weakness and numbness of his left arm and leg.  CT of the brain revealed white matter hypodensity compatible with mild microvascular changes.  There were calcium deposits in the basal ganglia and dentate nuclei.  CT angiography revealed no flow-limiting stenosis or arterial dissection in the neck.  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  At C4-5, there was moderate severe spinal stenosis.  There was mild spinal stenosis at C3-4, C5-6 and C6-7.  There was severe bilateral foraminal stenosis at C4-5.\par \par Transthoracic echocardiogram revealed an ejection fraction of 55 to 60%.  There was impaired left ventricular relaxation.  There was mild mitral regurgitation.  There is mild thickening of the anterior and posterior mitral valve leaflets.  A bubble study apparently revealed no evidence of PFO.\par \par He reported excellent improvement in his left-sided strength.  He has however chronic right-sided neck discomfort.  He complains of shooting pain down his left arm and chronic numbness of his first, second and third fingers.  He had a weak left hand grasp, chronic numbness of the toes of both feet and intermittent low back pain.  He complained of nocturia.  He denied visual, cognitive or swallowing difficulties.  He is hard of hearing.\par \par Electrodiagnostic study performed on July 29, 2021 revealed a moderately severe axonal sensorimotor polyneuropathy.  There was electrophysiologic evidence of a predominantly chronic left C5 and C6 cervical radiculopathy.  There was no electrophysiologic evidence of left median or ulnar neuropathy.\par \par MRI of the cervical spine revealed a disc plus arthritic changes at C4-5 with severe spinal canal narrowing and abnormal cord signal.  At C5-6, there was moderate canal narrowing with indentation of the cord without abnormal cord signal.  There were cystic lesions within both parapharyngeal soft tissues which were not well visualized.  CT of the neck was suggested.\par \par MRI of the brain revealed mild white matter changes.  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  There was ectatic appearance of the cavernous left internal carotid artery with a 3 mm medially oriented aneurysm and suspected additional laterally oriented 2 mm aneurysm.\par \par On October 19 and 22, 2021, he underwent cervical decompression and fusion performed by Dr. Gómez King at [facility].\par \par He continues to experience jabbing pain from his left arm into his first through third fingers.  This occurs 3-4 times a day and is fleeting.  He has chronic numbness of his left radial hand and mild weakness.  Right arm and both legs legs are strong.  He is taking gabapentin 300 mg twice a day and Tylenol.  When he took gabapentin 3 times a day, he became dizzy.

## 2022-05-24 NOTE — OCCUPATIONAL THERAPY INITIAL EVALUATION ADULT - ANTICIPATED DISCHARGE DISPOSITION, OT EVAL
Last OV 3/16/2022  Next OV 8/22/2022      Requested Prescriptions     Pending Prescriptions Disp Refills    cyclobenzaprine (FLEXERIL) 10 MG tablet 90 tablet 2     Sig: TAKE ONE TABLET BY MOUTH THREE TIMES DAILY AS NEEDED FOR MUSCLE SPASMS
rehabilitation facility

## 2022-07-06 ENCOUNTER — NON-APPOINTMENT (OUTPATIENT)
Age: 82
End: 2022-07-06

## 2022-08-16 ENCOUNTER — APPOINTMENT (OUTPATIENT)
Dept: NEUROLOGY | Facility: CLINIC | Age: 82
End: 2022-08-16

## 2022-08-16 VITALS
BODY MASS INDEX: 19.6 KG/M2 | RESPIRATION RATE: 16 BRPM | WEIGHT: 140 LBS | DIASTOLIC BLOOD PRESSURE: 78 MMHG | HEIGHT: 71 IN | HEART RATE: 88 BPM | SYSTOLIC BLOOD PRESSURE: 128 MMHG

## 2022-08-16 PROCEDURE — 99214 OFFICE O/P EST MOD 30 MIN: CPT

## 2022-08-16 RX ORDER — DULOXETINE HYDROCHLORIDE 20 MG/1
20 CAPSULE, DELAYED RELEASE PELLETS ORAL
Qty: 180 | Refills: 3 | Status: ACTIVE | COMMUNITY
Start: 2022-08-16 | End: 1900-01-01

## 2022-08-16 NOTE — HISTORY OF PRESENT ILLNESS
[FreeTextEntry1] : Mr. EVREARDO FIGUEROA returned to the office having been last seen on March 1, 2022.  He is an 81 year right-handed patient who suffers from hypertension, hyperlipidemia, coronary artery disease status post stenting, atrial fibrillation and renal insufficiency.\par \par In late August 2020, he experienced severe neck pain.  Soon thereafter, he developed weakness and numbness of his left arm and leg.  CT of the brain revealed white matter hypodensity compatible with mild microvascular changes.  There were calcium deposits in the basal ganglia and dentate nuclei.  CT angiography revealed no flow-limiting stenosis or arterial dissection in the neck.  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  At C4-5, there was moderate severe spinal stenosis.  There was mild spinal stenosis at C3-4, C5-6 and C6-7.  There was severe bilateral foraminal stenosis at C4-5.\par \par Transthoracic echocardiogram revealed an ejection fraction of 55 to 60%.  There was impaired left ventricular relaxation.  There was mild mitral regurgitation.  There is mild thickening of the anterior and posterior mitral valve leaflets.  A bubble study apparently revealed no evidence of PFO.\par \par Electrodiagnostic study performed on July 29, 2021 revealed a moderately severe axonal sensorimotor polyneuropathy.  There was electrophysiologic evidence of a predominantly chronic left C5 and C6 cervical radiculopathy.  There was no electrophysiologic evidence of left median or ulnar neuropathy.\par \par MRI of the cervical spine revealed a disc plus arthritic changes at C4-5 with severe spinal canal narrowing and abnormal cord signal.  At C5-6, there was moderate canal narrowing with indentation of the cord without abnormal cord signal.  There were cystic lesions within both parapharyngeal soft tissues which were not well visualized.  CT of the neck was suggested.\par \par MRI of the brain revealed mild white matter changes.  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  There was ectatic appearance of the cavernous left internal carotid artery with a 3 mm medially oriented aneurysm and suspected additional laterally oriented 2 mm aneurysm.\par \par On October 19 and 22, 2021, he underwent cervical decompression and fusion performed by Dr. Gómez King at [facility].\par \par He complains of persistent numbness of his left first, second and third fingers.  His left hand is weak.  His legs are wobbly.  He complains of nocturia.  He has only slight left neck pain.  He complains of involuntary weight loss.  He reports improvement in his pain since beginning duloxetine 20 mg daily.  He remains on gabapentin 300 mg twice a day.\par \par Medications include duloxetine 20 mg daily, gabapentin 300 mg twice a day, allopurinol, baby aspirin, atorvastatin, chlorthalidone, ferrous sulfate, folic acid, metoprolol, Protonix and Xarelto.\par \par Medications include

## 2022-08-16 NOTE — PHYSICAL EXAM
[FreeTextEntry1] : Constitutional:  Patient was well-developed, well-nourished and in no acute distress. \par \par Head:  Normocephalic, atraumatic. Tympanic membranes were not examined.\par \par Neck:  Supple with limited movement in all directions.\par \par Cardiovascular:  Cardiac rhythm was regular without murmur. There were no carotid bruits. Peripheral pulses were full and symmetric. \par \par Respiratory:  Lungs were clear. \par \par Abdomen:  Soft and nontender. \par \par Spine:  Nontender. \par \par Skin:  There were no rashes. \par \par NEUROLOGICAL EXAMINATION:\par \par Mental Status: Patient was alert and oriented. Speech was fluent. There was no dysarthria. \par \par Cranial Nerves: \par \par II: He could finger count bilaterally. Pupils were equal and reactive. Visual fields were full.  Fundi were normal.\par \par III, IV, VI:  Eye movements were full without nystagmus. \par \par V: Facial sensation was intact. \par \par VII: Facial strength was normal. \par \par VIII: Hearing was equal. \par \par IX, X: Palatal movement was normal. Phonation was normal. \par \par XI: Sternocleidomastoids and trapezii were normal. \par \par XII: Tongue was midline and movements normal. There was no lingual atrophy or fasciculations. \par \par Motor Examination: Muscle bulk and strength were normal.  There was mild to moderate increased tone in both legs.  Even his intrinsic hand muscles were quite strong.\par \par Sensory Examination: There was decreased pinprick sensation in the left first, second and third fingers.  Vibration sense was diminished in the feet.  Joint position sense was intact.\par \par Reflexes: DTRs were 2 throughout except for the left biceps and brachioradialis which were absent.\par \par Plantar Responses: Plantar responses were flexor. \par \par Coordination/Cerebellar Function: There was no dysmetria on finger to nose or heel to shin testing. \par \par Gait/Stance: Gait was normal.  Tandem was unsteady.  Romberg was negative.\par

## 2022-08-16 NOTE — CONSULT LETTER
[Dear  ___] : Dear  [unfilled], [Consult Letter:] : I had the pleasure of evaluating your patient, [unfilled]. [Please see my note below.] : Please see my note below. [Consult Closing:] : Thank you very much for allowing me to participate in the care of this patient.  If you have any questions, please do not hesitate to contact me. [Sincerely,] : Sincerely, [DrMarv  ___] : Dr. DUNN [DrMarv ___] : Dr. DUNN [FreeTextEntry3] : Dayne Sandoval MD\par

## 2022-08-16 NOTE — ASSESSMENT
[FreeTextEntry1] : Mr. Riojas is an 81-year-old gentleman who suffers from hyperlipidemia, coronary artery disease status post stenting, and atrial fibrillation.  He has a chronic cervical myeloradiculopathy which appears unchanged.  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 Carlson regarding his involuntary weight loss.  I will increase his duloxetine dose to 40 mg daily.  He will undergo a follow-up MRA of the brain at  because he has an MRI compatible pacemaker.  These results will be forwarded to Dr. Douglas.  Further management will depend upon his clinical course. He will be reevaluated in 6 months.  Telephone contact will be maintained in the meantime.

## 2022-10-23 ENCOUNTER — NON-APPOINTMENT (OUTPATIENT)
Age: 82
End: 2022-10-23

## 2022-10-26 ENCOUNTER — APPOINTMENT (OUTPATIENT)
Dept: NEUROSURGERY | Facility: CLINIC | Age: 82
End: 2022-10-26

## 2022-10-26 VITALS
WEIGHT: 146 LBS | HEIGHT: 73 IN | DIASTOLIC BLOOD PRESSURE: 89 MMHG | SYSTOLIC BLOOD PRESSURE: 143 MMHG | OXYGEN SATURATION: 99 % | BODY MASS INDEX: 19.35 KG/M2 | TEMPERATURE: 97.3 F | HEART RATE: 73 BPM

## 2022-10-26 DIAGNOSIS — Z87.891 PERSONAL HISTORY OF NICOTINE DEPENDENCE: ICD-10-CM

## 2022-10-26 PROCEDURE — 99214 OFFICE O/P EST MOD 30 MIN: CPT

## 2022-10-26 RX ORDER — ATORVASTATIN CALCIUM 40 MG/1
40 TABLET, FILM COATED ORAL
Refills: 0 | Status: DISCONTINUED | COMMUNITY
End: 2022-10-26

## 2022-10-26 NOTE — HISTORY OF PRESENT ILLNESS
[FreeTextEntry1] : EVERARDO FIGUEROA is a 82 year old right handed male with PMH of HTN, HLD, CAD s/p stents x3 (2005), Afib on Xarelto, s/p Medtronic PPM, myositis, and renal insufficiency. In August 2020, he presented with severe neck pain, weakness and numbness of left arm and left leg. He was admitted for stroke evaluation. CTH negative, incidental finding of 4mm-5mm ACOMM aneurysm with 4mm sized neck. There was also 2 aneurysms in the left ICA cavernous 2 and 3mm. CT brain revealed white matter hypodensity compatible with mild microvascular changes. There were calcium deposits in the basal ganglia and dentate nuclei. \par \par CTA revealed no flow-limiting stenosis or arterial dissection in the neck. CT of the cervical spine revealed severe spondylosis and spinal stenosis. At C4-5, there was moderate severe spinal stenosis. There was mild spinal stenosis at C3-4, C5-6 and C6-7. There was severe bilateral foraminal stenosis at C4-5.\par \par Transthoracic echocardiogram revealed an ejection fraction of 55 to 60%. There was impaired left ventricular relaxation. There was mild mitral regurgitation. There is mild thickening of the anterior and posterior mitral valve leaflets. A bubble study apparently revealed no evidence of PFO.\par \par Discharged to [facility] on 9/3/2020 and was discharged home on 9/24/2020.\par \par MRI of the cervical spine revealed a disc plus arthritic changes at C4-5 with severe spinal canal narrowing and abnormal cord signal. At C5-6, there was moderate canal narrowing with indentation of the cord without abnormal cord signal. There were cystic lesions within both parapharyngeal soft tissues which were not well visualized. CT of the neck was suggested.\par \par On 7/2/21, he underwent repeat imaging with MRI brain, MRA brain, MRI Cervical spine.\par \par Electrodiagnostic study performed on 7/29/21 revealed a moderately severe axonal sensorimotor polyneuropathy. There was electrophysiologic evidence of a predominantly chronic left C5 and C6 cervical radiculopathy. T\par \par On 10/19/21 and 10/22/21, he underwent cervical decompression and fusion by Dr. Gómez King at [facility].\par \par PCP: Dr. Yon Carlson\par Cardiologist: Dr. Pako Covington\par Neurologist: Dr. Sandoval\par \par Today, he is neurologically intact and doing well with no complaints. On 10/19/22, he underwent MRA brain non con at [facility] which shows ACOMM aneurysm increased in size now measuring 6.7 x 5.5mm, previously 6.1 x 5mm compared to imaging on 7/2/21. Also shows stable left ICA cavernous aneurysms measuring 2 and 3mm.

## 2022-10-26 NOTE — PHYSICAL EXAM
[Person] : oriented to person [Motor Tone] : muscle tone was normal in all four extremities [Motor Strength] : muscle strength was normal in all four extremities [Abnormal Walk] : normal gait [Balance] : balance was intact [Place] : oriented to place [Time] : oriented to time

## 2022-10-26 NOTE — ASSESSMENT
[FreeTextEntry1] : IMPRESSION: \par 82M with PMH of HTN, HLD, CAD s/p stenting, afib, renal insufficiency, chronic cervical myeloradiculopathy. MRA brain at [facility] 10/19/22 shows unruptured ACOMM aneurysm 6.7 x 5.5mm, previously 6.1 x 5mm, and left cavernous ICA aneurysm 2-3mm..\par \par Explained to the patient and wife in great detail that there is marginal small increase of half a millimeter bigger in one dimension on the official report, and that there is a margin of error in these measurements in general. Overall the change is minimal. Regardless, given his age and comorbidities the risks of treatment still outweigh the risks of the natural history of the aneurysm. Even with overestimating the size of the Acomm aneurysm and calling it 7mm, his PHASES score puts 5 year rupture risk at 4.3%. \par \par With all factors considered, treatment risks likely carry at least 10-15% risk of complication including stroke or hemorrhage. Risks of general anesthesia are not trivial given his significant comorbidities. He has a very torturous aortic arch making endovascular access difficult and increases stroke risk given atherosclerotic disease. The use of a stent is precluded due to the need for a/c for Afib. Would have to therefore get a suboptimal result with balloon remodeling and coils. Furthermore, the use of contrast in endovascular procedures puts his kidneys are risk due to his CKD. \par \par Educated the patient on signs and symptoms of aneurysm rupture or subarachnoid hemorrhage, which is a life-threatening condition. Should he have severe, sudden onset worst headache of his life, advised that he must seek medical attention immediately and go to the emergency room if this occurs.\par \par Recommend proceeding with conservative management taking into account the rupture risk, and the difficulty and risks of treatment due to aneurysm morphology, and for the reasons described above. Discussed with the patient that the risks of treating his aneurysm is likely higher than the natural history of the aneurysm. The patient and his wife are in agreement and expressed understanding of this difficult decision. \par \par \par PLAN:\par After detailed discussion with patient, wife, and neurologist Dr. Sandoval, will continue managing conservatively due to the fact that the risks of treatment currently outweigh the benefits. \par

## 2023-04-17 ENCOUNTER — APPOINTMENT (OUTPATIENT)
Dept: NEUROLOGY | Facility: CLINIC | Age: 83
End: 2023-04-17
Payer: MEDICARE

## 2023-04-17 VITALS
HEIGHT: 72 IN | SYSTOLIC BLOOD PRESSURE: 136 MMHG | DIASTOLIC BLOOD PRESSURE: 77 MMHG | BODY MASS INDEX: 18.96 KG/M2 | WEIGHT: 140 LBS | HEART RATE: 97 BPM

## 2023-04-17 PROCEDURE — 99214 OFFICE O/P EST MOD 30 MIN: CPT

## 2023-05-31 RX ORDER — DULOXETINE HYDROCHLORIDE 20 MG/1
20 CAPSULE, DELAYED RELEASE PELLETS ORAL
Qty: 90 | Refills: 3 | Status: ACTIVE | COMMUNITY
Start: 2023-05-31 | End: 1900-01-01

## 2023-06-26 ENCOUNTER — NON-APPOINTMENT (OUTPATIENT)
Age: 83
End: 2023-06-26

## 2023-06-27 NOTE — PHYSICAL EXAM
[FreeTextEntry1] : Constitutional:  Patient was well-developed, well-nourished and in no acute distress. \par \par Head:  Normocephalic, atraumatic. Tympanic membranes were not examined.\par \par Neck:  Supple with limited movement in all directions especially left and right.\par \par Cardiovascular:  Cardiac rhythm was regular without murmur. There were no carotid bruits. Peripheral pulses were full and symmetric. \par \par Respiratory:  Lungs were clear. \par \par Abdomen:  Soft and nontender. \par \par Spine:  Nontender. \par \par Skin:  There were no rashes. \par \par NEUROLOGICAL EXAMINATION:\par \par Mental Status: Patient was alert and oriented. Speech was fluent. There was no dysarthria. \par \par Cranial Nerves: \par \par II: He could finger count bilaterally. Pupils were equal and reactive. Visual fields were full.  Fundi were poorly visualized.\par \par III, IV, VI:  Eye movements were full without nystagmus. \par \par V: Facial sensation was intact. \par \par VII: Facial strength was normal. \par \par VIII: Hearing was equal. \par \par IX, X: Palatal movement was normal. Phonation was normal. \par \par XI: Sternocleidomastoids and trapezii were normal. \par \par XII: Tongue was midline and movements normal. There was no lingual atrophy or fasciculations. \par \par Motor Examination: Muscle bulk and strength were normal.  There was mild to moderate increased tone in both legs.  The intrinsic hand muscles were quite strong.\par \par Sensory Examination: There was hyperpathia in his left first and second fingers.  There was shading of pinprick in a stocking distribution.  Vibration sense was diminished in the feet.  Joint position sense was intact.\par \par Reflexes: DTRs were 2 throughout except for the left biceps and brachioradialis which were absent and the right ankle jerk was 1+ and the left was trace.\par \par Plantar Responses: Plantar responses were neutral. \par \par Coordination/Cerebellar Function: There was no dysmetria on finger to nose or heel to shin testing. \par \par Gait/Stance: Gait was normal.  Tandem was unsteady.  Romberg was negative.\par

## 2023-06-27 NOTE — ASSESSMENT
[FreeTextEntry1] : Mr. Riojas is an 82-year-old gentleman who suffers from hyperlipidemia, coronary artery disease status post stenting, and atrial fibrillation.  He has a chronic cervical myeloradiculopathy which appears unchanged.  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  If ineffective, his duloxetine dose can be increased to 60 mg/day.  Pain management consultation can also be considered for possible cervical epidural steroid injections.  I suggested a follow-up MRA of the brain in October 2023.  Further management will depend upon these results and his clinical course.

## 2023-06-27 NOTE — HISTORY OF PRESENT ILLNESS
[FreeTextEntry1] : Mr. EVERARDO FIGUEROA returned to the office having been last seen on August 16, 2022.  He is an 82 year right-handed patient who suffers from hypertension, hyperlipidemia, coronary artery disease status post stenting, atrial fibrillation and renal insufficiency.\par \par In late August 2020, he experienced severe neck pain.  Soon thereafter, he developed weakness and numbness of his left arm and leg.  CT of the brain revealed white matter hypodensity compatible with mild microvascular changes.  There were calcium deposits in the basal ganglia and dentate nuclei.  CT angiography revealed no flow-limiting stenosis or arterial dissection in the neck.  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  At C4-5, there was moderate severe spinal stenosis.  There was mild spinal stenosis at C3-4, C5-6 and C6-7.  There was severe bilateral foraminal stenosis at C4-5.\par \par Transthoracic echocardiogram revealed an ejection fraction of 55 to 60%.  There was impaired left ventricular relaxation.  There was mild mitral regurgitation.  There is mild thickening of the anterior and posterior mitral valve leaflets.  A bubble study apparently revealed no evidence of PFO.\par \par Electrodiagnostic study performed on July 29, 2021 revealed a moderately severe axonal sensorimotor polyneuropathy.  There was electrophysiologic evidence of a predominantly chronic left C5 and C6 cervical radiculopathy.  There was no electrophysiologic evidence of left median or ulnar neuropathy.\par \par MRI of the cervical spine revealed a disc plus arthritic changes at C4-5 with severe spinal canal narrowing and abnormal cord signal.  At C5-6, there was moderate canal narrowing with indentation of the cord without abnormal cord signal.  There were cystic lesions within both parapharyngeal soft tissues which were not well visualized.  CT of the neck was suggested.\par \par MRI of the brain revealed mild white matter changes.  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  There was ectatic appearance of the cavernous left internal carotid artery with a 3 mm medially oriented aneurysm and suspected additional laterally oriented 2 mm aneurysm.\par \par On October 19 and 22, 2021, he underwent cervical decompression and fusion performed by Dr. Gómez King at [facility].\par \par When last seen on August 16, 2022, he complained of persistent numbness of his left first, second and third fingers.  His left hand is weak.  His legs were wobbly.  He complained of nocturia.  He had only slight left neck pain.  He complained of involuntary weight loss.  He reported improvement in his pain since beginning duloxetine 20 mg daily.  He remained on gabapentin 300 mg twice a day.\par \par He reports intermittent pain from his left shoulder to his hand.  He has numbness of his left first through fourth fingers.  He complains of imbalance for which he believes he might use a cane.  He complains of nocturia x3.  Since last seen, his duloxetine dose was increased from 20 to 40 mg/day.  He remains on gabapentin 300 mg twice a day.  Increased doses made him unsteady.  He has been otherwise well since last seen.\par \par Medications include duloxetine 40 mg daily, gabapentin 300 mg twice a day, allopurinol, baby aspirin, atorvastatin 80 mg, chlorthalidone, ferrous sulfate, folic acid, metoprolol, Protonix and Xarelto.\par \par

## 2023-09-05 RX ORDER — DULOXETINE HYDROCHLORIDE 20 MG/1
20 CAPSULE, DELAYED RELEASE PELLETS ORAL
Qty: 90 | Refills: 1 | Status: ACTIVE | COMMUNITY
Start: 2023-09-05 | End: 1900-01-01

## 2023-10-25 RX ORDER — DULOXETINE HYDROCHLORIDE 20 MG/1
20 CAPSULE, DELAYED RELEASE PELLETS ORAL
Qty: 180 | Refills: 3 | Status: ACTIVE | COMMUNITY
Start: 2023-10-25 | End: 1900-01-01

## 2024-01-10 ENCOUNTER — APPOINTMENT (OUTPATIENT)
Dept: NEUROLOGY | Facility: CLINIC | Age: 84
End: 2024-01-10
Payer: MEDICARE

## 2024-01-10 VITALS
WEIGHT: 150 LBS | DIASTOLIC BLOOD PRESSURE: 76 MMHG | HEART RATE: 79 BPM | HEIGHT: 72 IN | SYSTOLIC BLOOD PRESSURE: 118 MMHG | BODY MASS INDEX: 20.32 KG/M2

## 2024-01-10 DIAGNOSIS — I67.1 CEREBRAL ANEURYSM, NONRUPTURED: ICD-10-CM

## 2024-01-10 DIAGNOSIS — G95.9 DISEASE OF SPINAL CORD, UNSPECIFIED: ICD-10-CM

## 2024-01-10 PROCEDURE — 99213 OFFICE O/P EST LOW 20 MIN: CPT

## 2024-01-10 NOTE — HISTORY OF PRESENT ILLNESS
[FreeTextEntry1] : Mr. EVERARDO FIGUEROA returned to the office having been last seen on April 17, 2023.  He is an 83 year right-handed patient who suffers from hypertension, hyperlipidemia, coronary artery disease status post stenting, atrial fibrillation and renal insufficiency.  In late August 2020, he experienced severe neck pain.  Soon thereafter, he developed weakness and numbness of his left arm and leg.  CT of the brain revealed white matter hypodensity compatible with mild microvascular changes.  There were calcium deposits in the basal ganglia and dentate nuclei.  CT angiography revealed no flow-limiting stenosis or arterial dissection in the neck.  There was a multilobulated superiorly projecting anterior communicating artery aneurysm at the dome measuring 4 mm in size in the neck measuring 4 to 5 mm in size.  CT of the cervical spine revealed severe spondylosis and spinal stenosis.  At C4-5, there was moderate severe spinal stenosis.  There was mild spinal stenosis at C3-4, C5-6 and C6-7.  There was severe bilateral foraminal stenosis at C4-5.  Transthoracic echocardiogram revealed an ejection fraction of 55 to 60%.  There was impaired left ventricular relaxation.  There was mild mitral regurgitation.  There is mild thickening of the anterior and posterior mitral valve leaflets.  A bubble study apparently revealed no evidence of PFO.  Electrodiagnostic study performed on July 29, 2021 revealed a moderately severe axonal sensorimotor polyneuropathy.  There was electrophysiologic evidence of a predominantly chronic left C5 and C6 cervical radiculopathy.  There was no electrophysiologic evidence of left median or ulnar neuropathy.  MRI of the cervical spine revealed a disc plus arthritic changes at C4-5 with severe spinal canal narrowing and abnormal cord signal.  At C5-6, there was moderate canal narrowing with indentation of the cord without abnormal cord signal.  There were cystic lesions within both parapharyngeal soft tissues which were not well visualized.  CT of the neck was suggested.  MRI of the brain revealed mild white matter changes.  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  There was ectatic appearance of the cavernous left internal carotid artery with a 3 mm medially oriented aneurysm and suspected additional laterally oriented 2 mm aneurysm.  On October 19 and 22, 2021, he underwent cervical decompression and fusion performed by Dr. Gómez King at [facility].  When seen on August 16, 2022, he complained of persistent numbness of his left first, second and third fingers.  His left hand is weak.  His legs were wobbly.  He complained of nocturia.  He had only slight left neck pain.  He complained of involuntary weight loss.  He reported improvement in his pain since beginning duloxetine 20 mg daily.  He remained on gabapentin 300 mg twice a day.  At his April 2023 visit, he reported intermittent pain from his left shoulder to his hand.  He had numbness of his left first through fourth fingers.  He complained of imbalance for which he believed he might use a cane.  He complained of nocturia x3.  His duloxetine dose has been increased from 20 to 40 mg/day.  He remained on gabapentin 300 mg twice a day.  Increased doses made him unsteady.  He had been otherwise well since last seen.  He underwent a follow-up MR angiogram in June 2023.  There is a stable anterior communicating artery aneurysm and possible tiny distal left internal carotid artery aneurysm.  These findings were unchanged compared to October 2022.  He reports left neck pain and left upper extremity discomfort.  He is otherwise doing quite well.  Medications include duloxetine 40 mg daily, gabapentin 300 mg twice a day, allopurinol, baby aspirin, atorvastatin 80 mg, chlorthalidone, ferrous sulfate, folic acid, metoprolol, Protonix and Xarelto.

## 2024-01-10 NOTE — PHYSICAL EXAM
[FreeTextEntry1] : Constitutional:  Patient was well-developed, well-nourished and in no acute distress.   Head:  Normocephalic, atraumatic. Tympanic membranes were not examined.  Neck:  Supple with limited movement in all directions especially left and right.  Cardiovascular:  Cardiac rhythm was regular without murmur. There were no carotid bruits. Peripheral pulses were full and symmetric.   Respiratory:  Lungs were clear.   Abdomen:  Soft and nontender.   Spine:  Nontender.   Skin:  There were no rashes.   NEUROLOGICAL EXAMINATION:  Mental Status: Patient was alert and oriented. Speech was fluent. There was no dysarthria.   Cranial Nerves:   II: He could finger count bilaterally. Pupils were equal and reactive. Visual fields were full.  Fundi were normal.  III, IV, VI:  Eye movements were full without nystagmus.   V: Facial sensation was intact.   VII: Facial strength was normal.   VIII: Hearing was equal.   IX, X: Palatal movement was normal. Phonation was normal.   XI: Sternocleidomastoids and trapezii were normal.   XII: Tongue was midline and movements normal. There was no lingual atrophy or fasciculations.   Motor Examination: Muscle bulk and strength were normal.  There was mild, if any increased tone in his legs.  Sensory Examination: Joint position sense was intact.  Vibration was diminished in the feet.  There was shading of pinprick in a stocking distribution without deficit in the hands.  Reflexes: DTRs were 2 throughout except for the left biceps and brachioradialis which were absent and the right ankle jerk was 1+ and the left was trace.  Plantar Responses: Plantar responses were neutral.   Coordination/Cerebellar Function: There was no dysmetria on finger to nose or heel to shin testing.   Gait/Stance: Gait was normal.  Tandem was unsteady.  Romberg was negative.

## 2024-01-10 NOTE — ASSESSMENT
[FreeTextEntry1] : Mr. Riojas is an 83-year-old with a chronic cervical myelopathy status post decompression.  He has residual left upper extremity pain likely due to myeloradiculopathy.  I would not favor cervical epidural steroid injections particularly in view of his use of Xarelto and aspirin.  He will undergo a follow-up MRA of the brain in summer 2024.  I suggested that he avoid trauma.  Further management will depend upon his clinical course.

## 2024-05-02 ENCOUNTER — RX RENEWAL (OUTPATIENT)
Age: 84
End: 2024-05-02

## 2024-05-02 RX ORDER — DULOXETINE HYDROCHLORIDE 20 MG/1
20 CAPSULE, DELAYED RELEASE PELLETS ORAL
Qty: 180 | Refills: 2 | Status: ACTIVE | COMMUNITY
Start: 2024-03-12 | End: 1900-01-01

## 2024-05-13 NOTE — H&P ADULT - NSHPPHYSICALEXAM_GEN_ALL_CORE
PHYSICAL EXAM:  GENERAL: NAD, well-developed  HEAD:  Atraumatic, Normocephalic  EYES: EOMI, PERRLA, conjunctiva and sclera clear  NECK: Supple, No JVD  CHEST/LUNG: Clear to auscultation bilaterally; No wheeze  HEART: Regular rate and rhythm; No murmurs, rubs, or gallops  ABDOMEN: Soft, Nontender, Nondistended; Bowel sounds present  EXTREMITIES:  2+ Peripheral Pulses, No clubbing, cyanosis, or edema  PSYCH: AAOx3  NEUROLOGY: see below  SKIN: No rashes or lesions Negative

## 2024-10-10 ENCOUNTER — APPOINTMENT (OUTPATIENT)
Dept: NEUROLOGY | Facility: CLINIC | Age: 84
End: 2024-10-10
Payer: MEDICARE

## 2024-10-10 VITALS
SYSTOLIC BLOOD PRESSURE: 149 MMHG | DIASTOLIC BLOOD PRESSURE: 92 MMHG | BODY MASS INDEX: 18.28 KG/M2 | HEART RATE: 84 BPM | HEIGHT: 72 IN | WEIGHT: 135 LBS

## 2024-10-10 DIAGNOSIS — I67.1 CEREBRAL ANEURYSM, NONRUPTURED: ICD-10-CM

## 2024-10-10 DIAGNOSIS — G95.9 DISEASE OF SPINAL CORD, UNSPECIFIED: ICD-10-CM

## 2024-10-10 PROCEDURE — 99213 OFFICE O/P EST LOW 20 MIN: CPT

## 2024-11-25 ENCOUNTER — NON-APPOINTMENT (OUTPATIENT)
Age: 84
End: 2024-11-25

## 2024-12-11 ENCOUNTER — APPOINTMENT (OUTPATIENT)
Dept: NEUROSURGERY | Facility: CLINIC | Age: 84
End: 2024-12-11
Payer: MEDICARE

## 2024-12-11 ENCOUNTER — NON-APPOINTMENT (OUTPATIENT)
Age: 84
End: 2024-12-11

## 2024-12-11 VITALS
BODY MASS INDEX: 18.28 KG/M2 | WEIGHT: 135 LBS | DIASTOLIC BLOOD PRESSURE: 89 MMHG | HEART RATE: 84 BPM | SYSTOLIC BLOOD PRESSURE: 132 MMHG | HEIGHT: 72 IN | OXYGEN SATURATION: 100 %

## 2024-12-11 DIAGNOSIS — I67.1 CEREBRAL ANEURYSM, NONRUPTURED: ICD-10-CM

## 2024-12-11 PROCEDURE — 99215 OFFICE O/P EST HI 40 MIN: CPT

## 2025-06-02 ENCOUNTER — NON-APPOINTMENT (OUTPATIENT)
Age: 85
End: 2025-06-02

## 2025-06-03 ENCOUNTER — APPOINTMENT (OUTPATIENT)
Dept: NEUROLOGY | Facility: CLINIC | Age: 85
End: 2025-06-03
Payer: MEDICARE

## 2025-06-03 ENCOUNTER — NON-APPOINTMENT (OUTPATIENT)
Age: 85
End: 2025-06-03

## 2025-06-03 VITALS
WEIGHT: 135 LBS | DIASTOLIC BLOOD PRESSURE: 73 MMHG | BODY MASS INDEX: 18.28 KG/M2 | HEART RATE: 93 BPM | HEIGHT: 72 IN | SYSTOLIC BLOOD PRESSURE: 131 MMHG

## 2025-06-03 DIAGNOSIS — G95.9 DISEASE OF SPINAL CORD, UNSPECIFIED: ICD-10-CM

## 2025-06-03 DIAGNOSIS — I67.1 CEREBRAL ANEURYSM, NONRUPTURED: ICD-10-CM

## 2025-06-03 PROCEDURE — 99213 OFFICE O/P EST LOW 20 MIN: CPT
